# Patient Record
Sex: FEMALE | Race: WHITE | NOT HISPANIC OR LATINO
[De-identification: names, ages, dates, MRNs, and addresses within clinical notes are randomized per-mention and may not be internally consistent; named-entity substitution may affect disease eponyms.]

---

## 2017-01-31 ENCOUNTER — APPOINTMENT (OUTPATIENT)
Dept: BREAST CENTER | Facility: CLINIC | Age: 27
End: 2017-01-31

## 2017-01-31 VITALS
HEART RATE: 110 BPM | SYSTOLIC BLOOD PRESSURE: 110 MMHG | HEIGHT: 61 IN | TEMPERATURE: 97.8 F | WEIGHT: 93 LBS | BODY MASS INDEX: 17.56 KG/M2 | DIASTOLIC BLOOD PRESSURE: 66 MMHG

## 2017-02-20 ENCOUNTER — FORM ENCOUNTER (OUTPATIENT)
Age: 27
End: 2017-02-20

## 2017-02-21 ENCOUNTER — RESULT REVIEW (OUTPATIENT)
Age: 27
End: 2017-02-21

## 2017-02-21 ENCOUNTER — APPOINTMENT (OUTPATIENT)
Dept: SURGERY | Facility: CLINIC | Age: 27
End: 2017-02-21

## 2017-02-21 ENCOUNTER — OUTPATIENT (OUTPATIENT)
Dept: OUTPATIENT SERVICES | Facility: HOSPITAL | Age: 27
LOS: 1 days | End: 2017-02-21
Payer: COMMERCIAL

## 2017-02-21 VITALS
SYSTOLIC BLOOD PRESSURE: 82 MMHG | WEIGHT: 95.24 LBS | HEART RATE: 86 BPM | HEIGHT: 61 IN | DIASTOLIC BLOOD PRESSURE: 59 MMHG | RESPIRATION RATE: 16 BRPM | TEMPERATURE: 99 F

## 2017-02-21 DIAGNOSIS — G43.909 MIGRAINE, UNSPECIFIED, NOT INTRACTABLE, WITHOUT STATUS MIGRAINOSUS: ICD-10-CM

## 2017-02-21 DIAGNOSIS — N39.0 URINARY TRACT INFECTION, SITE NOT SPECIFIED: ICD-10-CM

## 2017-02-21 DIAGNOSIS — C50.912 MALIGNANT NEOPLASM OF UNSPECIFIED SITE OF LEFT FEMALE BREAST: ICD-10-CM

## 2017-02-21 DIAGNOSIS — F41.9 ANXIETY DISORDER, UNSPECIFIED: ICD-10-CM

## 2017-02-21 DIAGNOSIS — G43.009 MIGRAINE WITHOUT AURA, NOT INTRACTABLE, WITHOUT STATUS MIGRAINOSUS: ICD-10-CM

## 2017-02-21 DIAGNOSIS — Z01.818 ENCOUNTER FOR OTHER PREPROCEDURAL EXAMINATION: ICD-10-CM

## 2017-02-21 DIAGNOSIS — J30.2 OTHER SEASONAL ALLERGIC RHINITIS: ICD-10-CM

## 2017-02-21 DIAGNOSIS — Z98.890 OTHER SPECIFIED POSTPROCEDURAL STATES: Chronic | ICD-10-CM

## 2017-02-21 LAB
ANION GAP SERPL CALC-SCNC: 8 MMOL/L — LOW (ref 9–16)
APPEARANCE UR: CLEAR — SIGNIFICANT CHANGE UP
APTT BLD: 31.3 SEC — SIGNIFICANT CHANGE UP (ref 27.5–37.4)
BACTERIA # UR AUTO: PRESENT /HPF
BILIRUB UR-MCNC: NEGATIVE — SIGNIFICANT CHANGE UP
BUN SERPL-MCNC: 18 MG/DL — SIGNIFICANT CHANGE UP (ref 7–23)
CALCIUM SERPL-MCNC: 8.7 MG/DL — SIGNIFICANT CHANGE UP (ref 8.5–10.5)
CHLORIDE SERPL-SCNC: 106 MMOL/L — SIGNIFICANT CHANGE UP (ref 96–108)
CO2 SERPL-SCNC: 26 MMOL/L — SIGNIFICANT CHANGE UP (ref 22–31)
COLOR SPEC: YELLOW — SIGNIFICANT CHANGE UP
COMMENT - URINE: SIGNIFICANT CHANGE UP
CREAT SERPL-MCNC: 0.51 MG/DL — SIGNIFICANT CHANGE UP (ref 0.5–1.3)
DIFF PNL FLD: (no result)
EPI CELLS # UR: SIGNIFICANT CHANGE UP /HPF
GLUCOSE SERPL-MCNC: 73 MG/DL — SIGNIFICANT CHANGE UP (ref 70–99)
GLUCOSE UR QL: NEGATIVE — SIGNIFICANT CHANGE UP
HCG SERPL-ACNC: <1 MIU/ML — SIGNIFICANT CHANGE UP
HCT VFR BLD CALC: 31.5 % — LOW (ref 34.5–45)
HGB BLD-MCNC: 10.4 G/DL — LOW (ref 11.5–15.5)
INR BLD: 1.13 — SIGNIFICANT CHANGE UP (ref 0.88–1.16)
KETONES UR-MCNC: NEGATIVE — SIGNIFICANT CHANGE UP
LEUKOCYTE ESTERASE UR-ACNC: NEGATIVE — SIGNIFICANT CHANGE UP
MCHC RBC-ENTMCNC: 31.5 PG — SIGNIFICANT CHANGE UP (ref 27–34)
MCHC RBC-ENTMCNC: 33 G/DL — SIGNIFICANT CHANGE UP (ref 32–36)
MCV RBC AUTO: 95.5 FL — SIGNIFICANT CHANGE UP (ref 80–100)
NITRITE UR-MCNC: NEGATIVE — SIGNIFICANT CHANGE UP
PH UR: 6.5 — SIGNIFICANT CHANGE UP (ref 4–8)
PLATELET # BLD AUTO: 334 K/UL — SIGNIFICANT CHANGE UP (ref 150–400)
POTASSIUM SERPL-MCNC: 3.4 MMOL/L — LOW (ref 3.5–5.3)
POTASSIUM SERPL-SCNC: 3.4 MMOL/L — LOW (ref 3.5–5.3)
PROT UR-MCNC: NEGATIVE MG/DL — SIGNIFICANT CHANGE UP
PROTHROM AB SERPL-ACNC: 12.6 SEC — SIGNIFICANT CHANGE UP (ref 10–13.1)
RBC # BLD: 3.3 M/UL — LOW (ref 3.8–5.2)
RBC # FLD: 13.3 % — SIGNIFICANT CHANGE UP (ref 10.3–16.9)
RBC CASTS # UR COMP ASSIST: < 5 /HPF — SIGNIFICANT CHANGE UP
SODIUM SERPL-SCNC: 140 MMOL/L — SIGNIFICANT CHANGE UP (ref 135–145)
SP GR SPEC: 1.02 — SIGNIFICANT CHANGE UP (ref 1–1.03)
UROBILINOGEN FLD QL: 1 E.U./DL — SIGNIFICANT CHANGE UP
WBC # BLD: 6.6 K/UL — SIGNIFICANT CHANGE UP (ref 3.8–10.5)
WBC # FLD AUTO: 6.6 K/UL — SIGNIFICANT CHANGE UP (ref 3.8–10.5)
WBC UR QL: < 5 /HPF — SIGNIFICANT CHANGE UP

## 2017-02-21 PROCEDURE — 93010 ELECTROCARDIOGRAM REPORT: CPT

## 2017-02-21 PROCEDURE — 71020: CPT | Mod: 26

## 2017-02-21 NOTE — H&P PST ADULT - FAMILY HISTORY
Father  Still living? Yes, Estimated age: Age Unknown  Family history of hypertension in father, Age at diagnosis: Age Unknown  Family history of diabetes mellitus in father, Age at diagnosis: Age Unknown  Family history of hypercholesterolemia, Age at diagnosis: Age Unknown

## 2017-02-21 NOTE — H&P PST ADULT - NSANTHOSAYNRD_GEN_A_CORE
No. ARIE screening performed.  STOP BANG Legend: 0-2 = LOW Risk; 3-4 = INTERMEDIATE Risk; 5-8 = HIGH Risk

## 2017-02-21 NOTE — H&P PST ADULT - HISTORY OF PRESENT ILLNESS
26 year old female with carcinoma of breast 26 year old white female with carcinoma of breast on left.  Patient with mass on exam and mammogram sonogram confirmed diagnosis.  Patient had two biopsies of the breast positive adenocarcinoma and Lymph glands positive for at least two.  Family history of breast cancer.  Paternal great aunt.  First pregnancy age 17.

## 2017-02-21 NOTE — H&P PST ADULT - REASON FOR ADMISSION
pre op for left breast total mastectomy with bilateral tissue expanders pre op for left breast total mastectomy and right simple mastectomy with bilateral tissue expanders

## 2017-02-22 ENCOUNTER — OUTPATIENT (OUTPATIENT)
Dept: OUTPATIENT SERVICES | Facility: HOSPITAL | Age: 27
LOS: 1 days | End: 2017-02-22
Payer: COMMERCIAL

## 2017-02-22 DIAGNOSIS — Z98.890 OTHER SPECIFIED POSTPROCEDURAL STATES: Chronic | ICD-10-CM

## 2017-02-22 DIAGNOSIS — C50.912 MALIGNANT NEOPLASM OF UNSPECIFIED SITE OF LEFT FEMALE BREAST: ICD-10-CM

## 2017-02-22 LAB
CULTURE RESULTS: NO GROWTH — SIGNIFICANT CHANGE UP
SPECIMEN SOURCE: SIGNIFICANT CHANGE UP

## 2017-02-23 LAB — SURGICAL PATHOLOGY STUDY: SIGNIFICANT CHANGE UP

## 2017-03-02 ENCOUNTER — OUTPATIENT (OUTPATIENT)
Dept: OUTPATIENT SERVICES | Facility: HOSPITAL | Age: 27
LOS: 1 days | End: 2017-03-02
Payer: COMMERCIAL

## 2017-03-02 ENCOUNTER — APPOINTMENT (OUTPATIENT)
Dept: PLASTIC SURGERY | Facility: CLINIC | Age: 27
End: 2017-03-02

## 2017-03-02 DIAGNOSIS — Z98.890 OTHER SPECIFIED POSTPROCEDURAL STATES: Chronic | ICD-10-CM

## 2017-03-02 PROCEDURE — 74177 CT ABD & PELVIS W/CONTRAST: CPT

## 2017-03-02 PROCEDURE — 71260 CT THORAX DX C+: CPT | Mod: 26

## 2017-03-02 PROCEDURE — 74177 CT ABD & PELVIS W/CONTRAST: CPT | Mod: 26

## 2017-03-02 PROCEDURE — 78472 GATED HEART PLANAR SINGLE: CPT | Mod: 26

## 2017-03-02 PROCEDURE — 78472 GATED HEART PLANAR SINGLE: CPT

## 2017-03-02 PROCEDURE — 71260 CT THORAX DX C+: CPT

## 2017-03-02 PROCEDURE — A9560: CPT

## 2017-03-03 VITALS
TEMPERATURE: 98 F | OXYGEN SATURATION: 99 % | DIASTOLIC BLOOD PRESSURE: 61 MMHG | HEIGHT: 61 IN | HEART RATE: 83 BPM | WEIGHT: 92.15 LBS | SYSTOLIC BLOOD PRESSURE: 91 MMHG | RESPIRATION RATE: 18 BRPM

## 2017-03-03 NOTE — PATIENT PROFILE ADULT. - TEACHING/LEARNING LEARNING PREFERENCES
written material/individual instruction/verbal instruction/audio individual instruction/verbal instruction/skill demonstration/written material

## 2017-03-03 NOTE — PATIENT PROFILE ADULT. - PMH
Breast cancer    Migraine headache    Urinary tract infection  2016 Breast cancer  left  Migraine headache    Urinary tract infection  2016

## 2017-03-03 NOTE — PRE-OP CHECKLIST - SELECT TESTS ORDERED
CXR/HCG/PT/PTT/INR/Urinalysis/EKG/CBC/CMP CBC/CXR/HCG/PT/PTT/CMP/Urinalysis/LMP FEBRUARY26,2017 ICON -/INR/EKG CBC/PT/PTT/HCG/CXR/Urinalysis/LMP FEBRUARY26,2017 ICON - negative/CMP/INR/EKG

## 2017-03-03 NOTE — PATIENT PROFILE ADULT. - PSH
Status post surgery  Vaccess CT power injectable (2016) H/O left breast biopsy  lymph node  Status post surgery  Vaccess CT power injectable (2016)

## 2017-03-05 ENCOUNTER — RESULT REVIEW (OUTPATIENT)
Age: 27
End: 2017-03-05

## 2017-03-06 ENCOUNTER — APPOINTMENT (OUTPATIENT)
Dept: PLASTIC SURGERY | Facility: CLINIC | Age: 27
End: 2017-03-06

## 2017-03-06 ENCOUNTER — APPOINTMENT (OUTPATIENT)
Dept: BREAST CENTER | Facility: HOSPITAL | Age: 27
End: 2017-03-06

## 2017-03-06 ENCOUNTER — APPOINTMENT (OUTPATIENT)
Dept: PLASTIC SURGERY | Facility: HOSPITAL | Age: 27
End: 2017-03-06

## 2017-03-06 ENCOUNTER — INPATIENT (INPATIENT)
Facility: HOSPITAL | Age: 27
LOS: 1 days | Discharge: HOME | DRG: 581 | End: 2017-03-08
Attending: SURGERY | Admitting: SURGERY
Payer: COMMERCIAL

## 2017-03-06 DIAGNOSIS — Z98.890 OTHER SPECIFIED POSTPROCEDURAL STATES: Chronic | ICD-10-CM

## 2017-03-06 DIAGNOSIS — C50.912 MALIGNANT NEOPLASM OF UNSPECIFIED SITE OF LEFT FEMALE BREAST: ICD-10-CM

## 2017-03-06 DIAGNOSIS — G43.009 MIGRAINE WITHOUT AURA, NOT INTRACTABLE, WITHOUT STATUS MIGRAINOSUS: ICD-10-CM

## 2017-03-06 DIAGNOSIS — F41.9 ANXIETY DISORDER, UNSPECIFIED: ICD-10-CM

## 2017-03-06 DIAGNOSIS — N30.00 ACUTE CYSTITIS WITHOUT HEMATURIA: ICD-10-CM

## 2017-03-06 DIAGNOSIS — C50.919 MALIGNANT NEOPLASM OF UNSPECIFIED SITE OF UNSPECIFIED FEMALE BREAST: ICD-10-CM

## 2017-03-06 PROCEDURE — 19357 TISS XPNDR PLMT BRST RCNSTJ: CPT | Mod: 50

## 2017-03-06 PROCEDURE — 19307 MAST MOD RAD: CPT | Mod: 59,LT,GC

## 2017-03-06 PROCEDURE — 19303 MAST SIMPLE COMPLETE: CPT | Mod: RT,GC

## 2017-03-06 RX ORDER — HYDROMORPHONE HYDROCHLORIDE 2 MG/ML
0.5 INJECTION INTRAMUSCULAR; INTRAVENOUS; SUBCUTANEOUS EVERY 4 HOURS
Qty: 0 | Refills: 0 | Status: DISCONTINUED | OUTPATIENT
Start: 2017-03-06 | End: 2017-03-06

## 2017-03-06 RX ORDER — CEFAZOLIN SODIUM 1 G
1000 VIAL (EA) INJECTION ONCE
Qty: 0 | Refills: 0 | Status: COMPLETED | OUTPATIENT
Start: 2017-03-06 | End: 2017-03-06

## 2017-03-06 RX ORDER — CEFAZOLIN SODIUM 1 G
VIAL (EA) INJECTION
Qty: 0 | Refills: 0 | Status: DISCONTINUED | OUTPATIENT
Start: 2017-03-06 | End: 2017-03-08

## 2017-03-06 RX ORDER — KETOROLAC TROMETHAMINE 30 MG/ML
15 SYRINGE (ML) INJECTION EVERY 8 HOURS
Qty: 0 | Refills: 0 | Status: DISCONTINUED | OUTPATIENT
Start: 2017-03-06 | End: 2017-03-08

## 2017-03-06 RX ORDER — BUPIVACAINE 13.3 MG/ML
20 INJECTION, SUSPENSION, LIPOSOMAL INFILTRATION ONCE
Qty: 0 | Refills: 0 | Status: DISCONTINUED | OUTPATIENT
Start: 2017-03-06 | End: 2017-03-08

## 2017-03-06 RX ORDER — OXYCODONE HYDROCHLORIDE 5 MG/1
5 TABLET ORAL EVERY 6 HOURS
Qty: 0 | Refills: 0 | Status: DISCONTINUED | OUTPATIENT
Start: 2017-03-06 | End: 2017-03-07

## 2017-03-06 RX ORDER — CEFAZOLIN SODIUM 1 G
1000 VIAL (EA) INJECTION EVERY 8 HOURS
Qty: 0 | Refills: 0 | Status: DISCONTINUED | OUTPATIENT
Start: 2017-03-07 | End: 2017-03-08

## 2017-03-06 RX ORDER — ONDANSETRON 8 MG/1
4 TABLET, FILM COATED ORAL EVERY 6 HOURS
Qty: 0 | Refills: 0 | Status: DISCONTINUED | OUTPATIENT
Start: 2017-03-06 | End: 2017-03-08

## 2017-03-06 RX ORDER — HYDROMORPHONE HYDROCHLORIDE 2 MG/ML
0.25 INJECTION INTRAMUSCULAR; INTRAVENOUS; SUBCUTANEOUS EVERY 4 HOURS
Qty: 0 | Refills: 0 | Status: DISCONTINUED | OUTPATIENT
Start: 2017-03-06 | End: 2017-03-07

## 2017-03-06 RX ORDER — SODIUM CHLORIDE 9 MG/ML
1000 INJECTION, SOLUTION INTRAVENOUS
Qty: 0 | Refills: 0 | Status: DISCONTINUED | OUTPATIENT
Start: 2017-03-06 | End: 2017-03-07

## 2017-03-06 RX ORDER — HYDROMORPHONE HYDROCHLORIDE 2 MG/ML
1 INJECTION INTRAMUSCULAR; INTRAVENOUS; SUBCUTANEOUS EVERY 4 HOURS
Qty: 0 | Refills: 0 | Status: DISCONTINUED | OUTPATIENT
Start: 2017-03-06 | End: 2017-03-06

## 2017-03-06 RX ORDER — DIAZEPAM 5 MG
5 TABLET ORAL EVERY 8 HOURS
Qty: 0 | Refills: 0 | Status: DISCONTINUED | OUTPATIENT
Start: 2017-03-06 | End: 2017-03-08

## 2017-03-06 RX ORDER — OXYCODONE HYDROCHLORIDE 5 MG/1
10 TABLET ORAL EVERY 6 HOURS
Qty: 0 | Refills: 0 | Status: DISCONTINUED | OUTPATIENT
Start: 2017-03-06 | End: 2017-03-07

## 2017-03-06 RX ORDER — CARDIOPLEGIC SOLUTION NO.1 K+=16MEQ/L
1000 PLASTIC BAG, PERFUSION (ML) PERFUSION
Qty: 0 | Refills: 0 | Status: DISCONTINUED | OUTPATIENT
Start: 2017-03-06 | End: 2017-03-06

## 2017-03-06 RX ADMIN — HYDROMORPHONE HYDROCHLORIDE 0.25 MILLIGRAM(S): 2 INJECTION INTRAMUSCULAR; INTRAVENOUS; SUBCUTANEOUS at 20:35

## 2017-03-06 RX ADMIN — Medication 15 MILLIGRAM(S): at 22:03

## 2017-03-06 RX ADMIN — Medication 15 MILLIGRAM(S): at 22:18

## 2017-03-06 RX ADMIN — Medication 100 MILLIGRAM(S): at 22:03

## 2017-03-06 RX ADMIN — HYDROMORPHONE HYDROCHLORIDE 0.25 MILLIGRAM(S): 2 INJECTION INTRAMUSCULAR; INTRAVENOUS; SUBCUTANEOUS at 20:50

## 2017-03-06 RX ADMIN — Medication 5 MILLIGRAM(S): at 22:03

## 2017-03-06 NOTE — H&P ADULT - HISTORY OF PRESENT ILLNESS
27 y/o F presents with L breast cancer for L modified radical mastectomy and R total mastectomy with tissue expanders

## 2017-03-06 NOTE — BRIEF OPERATIVE NOTE - POST-OP DX
Breast cancer, left  03/06/2017    Active  Megan Decker
Breast cancer, left  03/06/2017    Active  Megan Decker

## 2017-03-06 NOTE — BRIEF OPERATIVE NOTE - SPECIMENS
R breast retroareolar tissue, R breast tissue, L breast anterior superior margin, L breast w/ cancer suture, L axillary contents level 1 and 2

## 2017-03-06 NOTE — PROGRESS NOTE ADULT - SUBJECTIVE AND OBJECTIVE BOX
Pre Op Dx: Left breast cancer  Procedure: Bilateral mastectomy with tissue expanders  Surgeon: Bernik, Lerman    S: Pt has no complaints. Denies CP, SOB, N/V. Pain controlled with medication.    O:  T(C): 36.7, Max: 37.6 (03-06 @ 20:00)  T(F): 98, Max: 99.6 (03-06 @ 20:00)  HR: 78 (78 - 99)  BP: 112/58 (110/56 - 118/65)  RR: 18 (12 - 18)  SpO2: 100% (99% - 100%)  Wt(kg): --            Gen: NAD, resting comfortably in bed  Neuro: AAOx3, No focal deficits  C/V: RRR, +S1/S2  Pulm: CTAB, no respiratory distress  Abd: soft, ND, ATTP  Incision: C/D/I  Drains:    Extrem: WNL, SCDs in place      A/P: 26yFemale s/p above procedure  Diet: CLD advance when tolerated   IVF until tolerating diet  Pain/nausea control PRN   DVT ppx: SCDs/SQH  Abx: Ancef  CALIXTO x 3

## 2017-03-06 NOTE — CONSULT NOTE ADULT - SUBJECTIVE AND OBJECTIVE BOX
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  Patient with mass on exam and mammogram sonogram confirmed diagnosis.  Patient had two biopsies of the breast positive adenocarcinoma and Lymph glands positive for at least two.  Family history of breast cancer.  Paternal great aunt.  First pregnancy age 17.      PAST MEDICAL & SURGICAL HISTORY:  Urinary tract infection: 2016  Migraine headache  Breast cancer: left  H/O left breast biopsy: lymph node  Status post surgery: Vaccess CT power injectable (2016)  No significant past surgical history      REVIEW OF SYSTEMS    General:  normal  Skin/Breast: normal  Ophthalmologic: negative  ENMT:	normal  Respiratory and Thorax: normal  Cardiovascular:	normal  Gastrointestinal:	normal  Genitourinary:	normal  Musculoskeletal: normal  Neurological:	normal  Psychiatric:	normal  Hematology/Lymphatics:	negative  Endocrine:	negative  Allergic/Immunologic:	negative      MEDICATIONS  (STANDING):  BUpivacaine liposome 1.3% Injectable (no eMAR) 20milliLiter(s) Local Injection once  sodium chloride 0.9% Irrigation 1000milliLiter(s) Continuous Irrigation <Continuous>    Allergies    No Known Allergies    SOCIAL HISTORY:    FAMILY HISTORY:  Family history of hypercholesterolemia (Father)  Family history of diabetes mellitus in father (Father)  Family history of hypertension in father (Father)      PHYSICAL EXAM:          Vital Signs Last 24 Hrs  T(C): --  T(F): --98.6  HR: --72  BP: --120/80  BP(mean): --  RR: --16  SpO2: --    Constitutional: WDWNF in NAD  Eyes: conj pink  ENMT: negative  Neck: supple  Breasts: not examined   Back: negative  Respiratory: clear to P&A  Cardiovascular: no MRGT or H  Gastrointestinal: normal bowel sounds  Genitourinary: neg  Rectal: not examined  Extremities: normal  Vascular: normal  Neurological: normal  Skin: negative  Lymph Nodes: negative  Musculoskeletal:  normal   Psychiatric: anxiety      LABS: HPI:  26 year old white female with carcinoma of breast on left.  Patient with mass on exam and mammogram sonogram confirmed diagnosis.  Patient had two biopsies of the breast positive adenocarcinoma and Lymph glands positive for at least two.  Family history of breast cancer.  Paternal great aunt.  First pregnancy age 17.      PAST MEDICAL & SURGICAL HISTORY:  Urinary tract infection: 2016  Migraine headache  Breast cancer: left  H/O left breast biopsy: lymph node  Status post surgery: Vaccess CT power injectable (2016)  No significant past surgical history      REVIEW OF SYSTEMS    General:  normal  Skin/Breast: normal  Ophthalmologic: negative  ENMT:	normal  Respiratory and Thorax: normal  Cardiovascular:	normal  Gastrointestinal:	normal  Genitourinary:	normal  Musculoskeletal: normal  Neurological:	normal  Psychiatric:	normal  Hematology/Lymphatics:	negative  Endocrine:	negative  Allergic/Immunologic:	negative      MEDICATIONS  (STANDING):  BUpivacaine liposome 1.3% Injectable (no eMAR) 20milliLiter(s) Local Injection once  sodium chloride 0.9% Irrigation 1000milliLiter(s) Continuous Irrigation <Continuous>    Allergies    No Known Allergies    SOCIAL HISTORY:    FAMILY HISTORY:  Family history of hypercholesterolemia (Father)  Family history of diabetes mellitus in father (Father)  Family history of hypertension in father (Father)      PHYSICAL EXAM:     Vital Signs Last 24 Hrs  T(C): --  T(F): --98.6  HR: --72  BP: --120/80  BP(mean): --  RR: --16  SpO2: --    Constitutional: WDWNF in NAD  Eyes: conj pink  ENMT: negative  Neck: supple  Breasts: not examined   Back: negative  Respiratory: clear to P&A  Cardiovascular: no MRGT or H  Gastrointestinal: normal bowel sounds  Genitourinary: neg  Rectal: not examined  Extremities: normal  Vascular: normal  Neurological: normal  Skin: negative  Lymph Nodes: negative  Musculoskeletal:  normal   Psychiatric: anxiety      LABS:

## 2017-03-06 NOTE — H&P ADULT - ASSESSMENT
26 yr old female w/ hx L breast cancer presents for b/l mastectomy w/ tissue expanders after neoadjuvant therapy

## 2017-03-07 DIAGNOSIS — D50.0 IRON DEFICIENCY ANEMIA SECONDARY TO BLOOD LOSS (CHRONIC): ICD-10-CM

## 2017-03-07 LAB
ANION GAP SERPL CALC-SCNC: 7 MMOL/L — LOW (ref 9–16)
BUN SERPL-MCNC: 10 MG/DL — SIGNIFICANT CHANGE UP (ref 7–23)
CALCIUM SERPL-MCNC: 7.8 MG/DL — LOW (ref 8.5–10.5)
CHLORIDE SERPL-SCNC: 106 MMOL/L — SIGNIFICANT CHANGE UP (ref 96–108)
CO2 SERPL-SCNC: 24 MMOL/L — SIGNIFICANT CHANGE UP (ref 22–31)
CREAT SERPL-MCNC: 0.48 MG/DL — LOW (ref 0.5–1.3)
GLUCOSE SERPL-MCNC: 84 MG/DL — SIGNIFICANT CHANGE UP (ref 70–99)
HCT VFR BLD CALC: 26.4 % — LOW (ref 34.5–45)
HGB BLD-MCNC: 8.9 G/DL — LOW (ref 11.5–15.5)
MAGNESIUM SERPL-MCNC: 1.5 MG/DL — LOW (ref 1.6–2.4)
MCHC RBC-ENTMCNC: 31.1 PG — SIGNIFICANT CHANGE UP (ref 27–34)
MCHC RBC-ENTMCNC: 33.7 G/DL — SIGNIFICANT CHANGE UP (ref 32–36)
MCV RBC AUTO: 92.3 FL — SIGNIFICANT CHANGE UP (ref 80–100)
PHOSPHATE SERPL-MCNC: 3.8 MG/DL — SIGNIFICANT CHANGE UP (ref 2.5–4.5)
PLATELET # BLD AUTO: 192 K/UL — SIGNIFICANT CHANGE UP (ref 150–400)
POTASSIUM SERPL-MCNC: 3.5 MMOL/L — SIGNIFICANT CHANGE UP (ref 3.5–5.3)
POTASSIUM SERPL-SCNC: 3.5 MMOL/L — SIGNIFICANT CHANGE UP (ref 3.5–5.3)
RBC # BLD: 2.86 M/UL — LOW (ref 3.8–5.2)
RBC # FLD: 11.9 % — SIGNIFICANT CHANGE UP (ref 10.3–16.9)
SODIUM SERPL-SCNC: 137 MMOL/L — SIGNIFICANT CHANGE UP (ref 135–145)
WBC # BLD: 7.3 K/UL — SIGNIFICANT CHANGE UP (ref 3.8–10.5)
WBC # FLD AUTO: 7.3 K/UL — SIGNIFICANT CHANGE UP (ref 3.8–10.5)

## 2017-03-07 RX ORDER — DIAZEPAM 5 MG
1 TABLET ORAL
Qty: 21 | Refills: 0 | OUTPATIENT
Start: 2017-03-07 | End: 2017-03-14

## 2017-03-07 RX ORDER — HEPARIN SODIUM 5000 [USP'U]/ML
5000 INJECTION INTRAVENOUS; SUBCUTANEOUS EVERY 12 HOURS
Qty: 0 | Refills: 0 | Status: DISCONTINUED | OUTPATIENT
Start: 2017-03-07 | End: 2017-03-08

## 2017-03-07 RX ORDER — ACETAMINOPHEN 500 MG
650 TABLET ORAL EVERY 6 HOURS
Qty: 0 | Refills: 0 | Status: DISCONTINUED | OUTPATIENT
Start: 2017-03-07 | End: 2017-03-07

## 2017-03-07 RX ORDER — HYDROMORPHONE HYDROCHLORIDE 2 MG/ML
0.25 INJECTION INTRAMUSCULAR; INTRAVENOUS; SUBCUTANEOUS EVERY 4 HOURS
Qty: 0 | Refills: 0 | Status: DISCONTINUED | OUTPATIENT
Start: 2017-03-07 | End: 2017-03-08

## 2017-03-07 RX ORDER — OXYCODONE HYDROCHLORIDE 5 MG/1
1 TABLET ORAL
Qty: 42 | Refills: 0 | OUTPATIENT
Start: 2017-03-07 | End: 2017-03-14

## 2017-03-07 RX ADMIN — Medication 15 MILLIGRAM(S): at 22:17

## 2017-03-07 RX ADMIN — HYDROMORPHONE HYDROCHLORIDE 0.25 MILLIGRAM(S): 2 INJECTION INTRAMUSCULAR; INTRAVENOUS; SUBCUTANEOUS at 11:25

## 2017-03-07 RX ADMIN — Medication 15 MILLIGRAM(S): at 05:24

## 2017-03-07 RX ADMIN — HEPARIN SODIUM 5000 UNIT(S): 5000 INJECTION INTRAVENOUS; SUBCUTANEOUS at 17:14

## 2017-03-07 RX ADMIN — OXYCODONE HYDROCHLORIDE 5 MILLIGRAM(S): 5 TABLET ORAL at 00:22

## 2017-03-07 RX ADMIN — Medication 15 MILLIGRAM(S): at 22:32

## 2017-03-07 RX ADMIN — Medication 100 MILLIGRAM(S): at 22:17

## 2017-03-07 RX ADMIN — OXYCODONE HYDROCHLORIDE 5 MILLIGRAM(S): 5 TABLET ORAL at 08:00

## 2017-03-07 RX ADMIN — Medication 15 MILLIGRAM(S): at 13:18

## 2017-03-07 RX ADMIN — OXYCODONE HYDROCHLORIDE 5 MILLIGRAM(S): 5 TABLET ORAL at 01:20

## 2017-03-07 RX ADMIN — Medication 5 MILLIGRAM(S): at 05:09

## 2017-03-07 RX ADMIN — Medication 15 MILLIGRAM(S): at 05:09

## 2017-03-07 RX ADMIN — OXYCODONE HYDROCHLORIDE 5 MILLIGRAM(S): 5 TABLET ORAL at 07:18

## 2017-03-07 RX ADMIN — HYDROMORPHONE HYDROCHLORIDE 0.25 MILLIGRAM(S): 2 INJECTION INTRAMUSCULAR; INTRAVENOUS; SUBCUTANEOUS at 10:55

## 2017-03-07 RX ADMIN — Medication 5 MILLIGRAM(S): at 22:17

## 2017-03-07 RX ADMIN — Medication 100 MILLIGRAM(S): at 13:17

## 2017-03-07 RX ADMIN — Medication 100 MILLIGRAM(S): at 06:08

## 2017-03-07 RX ADMIN — Medication 5 MILLIGRAM(S): at 13:18

## 2017-03-07 RX ADMIN — Medication 15 MILLIGRAM(S): at 13:38

## 2017-03-07 NOTE — PROGRESS NOTE ADULT - SUBJECTIVE AND OBJECTIVE BOX
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  Patient with mass on exam and mammogram sonogram confirmed diagnosis.  Patient had two biopsies of the breast positive adenocarcinoma and Lymph glands positive for at least two.  Family history of breast cancer.  Paternal great aunt.  First pregnancy age 17.  Patient day #1 post op bilateral total mastectomies with bilateral tissue expanders with moderate pain and malaise.      PAST MEDICAL & SURGICAL HISTORY:  Urinary tract infection: 2016  Migraine headache  Breast cancer: left  H/O left breast biopsy: lymph node  Status post surgery: Vaccess CT power injectable (2016)  No significant past surgical history      MEDICATIONS  (STANDING):  BUpivacaine liposome 1.3% Injectable (no eMAR) 20milliLiter(s) Local Injection once  lactated ringers. 1000milliLiter(s) IV Continuous <Continuous>  ceFAZolin   IVPB  IV Intermittent   ketorolac   Injectable 15milliGRAM(s) IV Push every 8 hours  diazepam    Tablet 5milliGRAM(s) Oral every 8 hours  ceFAZolin   IVPB 1000milliGRAM(s) IV Intermittent every 8 hours    MEDICATIONS  (PRN):  ondansetron Injectable 4milliGRAM(s) IV Push every 6 hours PRN Nausea  oxyCODONE IR 5milliGRAM(s) Oral every 6 hours PRN Moderate Pain (4 - 6)  oxyCODONE IR 10milliGRAM(s) Oral every 6 hours PRN Severe Pain (7 - 10)  HYDROmorphone  Injectable 0.25milliGRAM(s) IV Push every 4 hours PRN Severe Pain (7 - 10)      Allergies    No Known Allergies    REVIEW OF SYSTEMS    General:  malaise	  Skin/Breast: normal  Ophthalmologic: negative  ENMT:	normal  Respiratory and Thorax: normal  Cardiovascular:	normal  Gastrointestinal:	normal  Genitourinary:	normal  Musculoskeletal:	swelling   Neurological:	normal  Psychiatric:	normal  Hematology/Lymphatics:	negative  Endocrine:	negative  Allergic/Immunologic:	negative      PHYSICAL EXAM:    Vital Signs Last 24 Hrs  T(C): 36.3, Max: 37.6 (03-06 @ 20:00)  T(F): 97.4, Max: 99.6 (03-06 @ 20:00)  HR: 82 (78 - 99)  BP: 105/65 (94/63 - 118/65)  BP(mean): --  RR: 18 (12 - 18)  SpO2: 100% (99% - 100%)    Constitutional: WDWNF in NAD  Eyes: conj pale  ENMT: negative  Neck: supple  Breasts: not examined   Back: negative  Respiratory: clear to P&A  Cardiovascular: no MRGT or H  Gastrointestinal: normal bowel sounds  Genitourinary: neg  Rectal: not examined  Extremities:  normal  Vascular: normal  Neurological: normal  Skin: negative  Lymph Nodes: negative  Musculoskeletal:  normal    Psychiatric: anxiety

## 2017-03-07 NOTE — PHYSICAL THERAPY INITIAL EVALUATION ADULT - ADDITIONAL COMMENTS
Patient lives in apartment with  and 3 children, 2 steps to enter. Patient lives in apartment with  and 3 children, 2 steps to enter. Patient's mother will stay with her first week and spouse will be available afterwards.

## 2017-03-07 NOTE — PROGRESS NOTE ADULT - PROBLEM SELECTOR PLAN 1
Discussed with house staff and nursing staff.  Pain management, PT, incentive spirometer, DVT prophylaxis, repeat labs and discharge planning.

## 2017-03-07 NOTE — PHYSICAL THERAPY INITIAL EVALUATION ADULT - ACTIVE RANGE OF MOTION EXAMINATION, REHAB EVAL
deficits as listed below/bilateral  lower extremity Active ROM was WFL (within functional limits)/Bilateral UE/LE AROM pain-limited/bilateral upper extremity Active ROM was WFL (within functional limits) deficits as listed below/Bilateral UE AROM pain-limited/bilateral upper extremity Active ROM was WFL (within functional limits)/bilateral  lower extremity Active ROM was WFL (within functional limits)

## 2017-03-07 NOTE — PHYSICAL THERAPY INITIAL EVALUATION ADULT - MANUAL MUSCLE TESTING RESULTS, REHAB EVAL
not tested due to/BUE not tested due to pain guarding grossly assessed due to/BUE not tested due to pain guarding, BLE >3/5 base don functional assessment

## 2017-03-07 NOTE — PHYSICAL THERAPY INITIAL EVALUATION ADULT - GENERAL OBSERVATIONS, REHAB EVAL
Patient received semi-supine in bed, No apparent distress, +JPx3, 2L of supplemental O2 (nasal cannula). Patient received semi-supine in bed, No apparent distress, +JPx3, 2L of supplemental O2 (nasal cannula). Patient states 9/10 pain with medication.

## 2017-03-07 NOTE — PROGRESS NOTE ADULT - SUBJECTIVE AND OBJECTIVE BOX
O/N: POC WNL. No S&S of hematoma, JPs x 3 with serosang output.     STATUS POST: bilateral mastectomy, Left axillary node disection, tissue expanders  POST OP DAY #:1 O/N: POC WNL. No S&S of hematoma, JPs x 3 with serosang output.     STATUS POST: bilateral mastectomy, Left axillary node dissection, tissue expanders  POST OP DAY #:1     SUBJECTIVE: Pt c/o pain. Patient seen and examined bedside by chief resident.    ceFAZolin   IVPB  IV Intermittent   ceFAZolin   IVPB 1000milliGRAM(s) IV Intermittent every 8 hours      Vital Signs Last 24 Hrs  T(C): 36.3, Max: 37.6 (03-06 @ 20:00)  T(F): 97.4, Max: 99.6 (03-06 @ 20:00)  HR: 82 (78 - 99)  BP: 105/65 (94/63 - 118/65)  BP(mean): --  RR: 18 (12 - 18)  SpO2: 100% (99% - 100%)  I&O's Detail    I & Os for current day (as of 07 Mar 2017 07:14)  =============================================  IN:    lactated ringers.: 540 ml    IV PiggyBack: 100 ml    Total IN: 640 ml  ---------------------------------------------  OUT:    Voided: 300 ml    Bulb: 120 ml    Bulb: 90 ml    Bulb: 60 ml    Total OUT: 570 ml  ---------------------------------------------  Total NET: 70 ml      General: NAD, resting comfortably in bed  C/V: NSR  Breast: no erythema/edema, no hematoma appreciated, tender to gentle palpation, JPs w/ serosang output  Pulm: Nonlabored breathing, no respiratory distress  Abd: soft, NT/ND.  Extrem: WWP, no edema, SCDs in place

## 2017-03-07 NOTE — PHYSICAL THERAPY INITIAL EVALUATION ADULT - CRITERIA FOR SKILLED THERAPEUTIC INTERVENTIONS
rehab potential/functional limitations in following categories/therapy frequency/impairments found functional limitations in following categories/therapy frequency/impairments found/rehab potential/anticipated discharge recommendation

## 2017-03-07 NOTE — PROGRESS NOTE ADULT - ASSESSMENT
s/p BTM and L ALND with Submuscular TE placement b/l.      -  Advance to regular diet for breakfast  -  If pain is controlled, able to ambulate and tolerate a regular diet, patient may be discharged home on PO Narcotics and PO valium.  No need for ABX.  -  F/u with Dr. Lerman in 1 week

## 2017-03-07 NOTE — PROGRESS NOTE ADULT - SUBJECTIVE AND OBJECTIVE BOX
Seen and examined this AM.  No acute events ovnt.  TPO clears.  Was oob ambulating.  Pain moderately controlled.      Exam:  AFVSS  NAD  bilateral breasts flat, skin intact.  CALIXTO R 90 L 120/60

## 2017-03-08 ENCOUNTER — TRANSCRIPTION ENCOUNTER (OUTPATIENT)
Age: 27
End: 2017-03-08

## 2017-03-08 VITALS
DIASTOLIC BLOOD PRESSURE: 86 MMHG | TEMPERATURE: 98 F | SYSTOLIC BLOOD PRESSURE: 95 MMHG | HEART RATE: 106 BPM | RESPIRATION RATE: 17 BRPM | OXYGEN SATURATION: 98 %

## 2017-03-08 LAB
ANION GAP SERPL CALC-SCNC: 8 MMOL/L — LOW (ref 9–16)
BUN SERPL-MCNC: 9 MG/DL — SIGNIFICANT CHANGE UP (ref 7–23)
CALCIUM SERPL-MCNC: 8.4 MG/DL — LOW (ref 8.5–10.5)
CHLORIDE SERPL-SCNC: 104 MMOL/L — SIGNIFICANT CHANGE UP (ref 96–108)
CO2 SERPL-SCNC: 24 MMOL/L — SIGNIFICANT CHANGE UP (ref 22–31)
CREAT SERPL-MCNC: 0.48 MG/DL — LOW (ref 0.5–1.3)
GLUCOSE SERPL-MCNC: 108 MG/DL — HIGH (ref 70–99)
HCT VFR BLD CALC: 25.9 % — LOW (ref 34.5–45)
HGB BLD-MCNC: 8.6 G/DL — LOW (ref 11.5–15.5)
MAGNESIUM SERPL-MCNC: 1.6 MG/DL — SIGNIFICANT CHANGE UP (ref 1.6–2.4)
MCHC RBC-ENTMCNC: 31.5 PG — SIGNIFICANT CHANGE UP (ref 27–34)
MCHC RBC-ENTMCNC: 33.2 G/DL — SIGNIFICANT CHANGE UP (ref 32–36)
MCV RBC AUTO: 94.9 FL — SIGNIFICANT CHANGE UP (ref 80–100)
PHOSPHATE SERPL-MCNC: 3.1 MG/DL — SIGNIFICANT CHANGE UP (ref 2.5–4.5)
PLATELET # BLD AUTO: 186 K/UL — SIGNIFICANT CHANGE UP (ref 150–400)
POTASSIUM SERPL-MCNC: 3.4 MMOL/L — LOW (ref 3.5–5.3)
POTASSIUM SERPL-SCNC: 3.4 MMOL/L — LOW (ref 3.5–5.3)
RBC # BLD: 2.73 M/UL — LOW (ref 3.8–5.2)
RBC # FLD: 11.2 % — SIGNIFICANT CHANGE UP (ref 10.3–16.9)
SODIUM SERPL-SCNC: 136 MMOL/L — SIGNIFICANT CHANGE UP (ref 135–145)
WBC # BLD: 7.3 K/UL — SIGNIFICANT CHANGE UP (ref 3.8–10.5)
WBC # FLD AUTO: 7.3 K/UL — SIGNIFICANT CHANGE UP (ref 3.8–10.5)

## 2017-03-08 PROCEDURE — 86850 RBC ANTIBODY SCREEN: CPT

## 2017-03-08 PROCEDURE — 88341 IMHCHEM/IMCYTCHM EA ADD ANTB: CPT

## 2017-03-08 PROCEDURE — C1789: CPT

## 2017-03-08 PROCEDURE — 88307 TISSUE EXAM BY PATHOLOGIST: CPT

## 2017-03-08 PROCEDURE — 97161 PT EVAL LOW COMPLEX 20 MIN: CPT

## 2017-03-08 PROCEDURE — 84100 ASSAY OF PHOSPHORUS: CPT

## 2017-03-08 PROCEDURE — 86901 BLOOD TYPING SEROLOGIC RH(D): CPT

## 2017-03-08 PROCEDURE — 36415 COLL VENOUS BLD VENIPUNCTURE: CPT

## 2017-03-08 PROCEDURE — 83735 ASSAY OF MAGNESIUM: CPT

## 2017-03-08 PROCEDURE — 80048 BASIC METABOLIC PNL TOTAL CA: CPT

## 2017-03-08 PROCEDURE — 97116 GAIT TRAINING THERAPY: CPT

## 2017-03-08 PROCEDURE — 88342 IMHCHEM/IMCYTCHM 1ST ANTB: CPT

## 2017-03-08 PROCEDURE — 86900 BLOOD TYPING SEROLOGIC ABO: CPT

## 2017-03-08 PROCEDURE — 85027 COMPLETE CBC AUTOMATED: CPT

## 2017-03-08 PROCEDURE — 88305 TISSUE EXAM BY PATHOLOGIST: CPT

## 2017-03-08 RX ORDER — CEPHALEXIN 500 MG
1 CAPSULE ORAL
Qty: 40 | Refills: 0 | OUTPATIENT
Start: 2017-03-08 | End: 2017-03-18

## 2017-03-08 RX ORDER — ACETAMINOPHEN 500 MG
1000 TABLET ORAL EVERY 6 HOURS
Qty: 0 | Refills: 0 | Status: DISCONTINUED | OUTPATIENT
Start: 2017-03-08 | End: 2017-03-08

## 2017-03-08 RX ORDER — POTASSIUM CHLORIDE 20 MEQ
40 PACKET (EA) ORAL ONCE
Qty: 0 | Refills: 0 | Status: COMPLETED | OUTPATIENT
Start: 2017-03-08 | End: 2017-03-08

## 2017-03-08 RX ORDER — DIAZEPAM 5 MG
5 TABLET ORAL EVERY 6 HOURS
Qty: 0 | Refills: 0 | Status: DISCONTINUED | OUTPATIENT
Start: 2017-03-08 | End: 2017-03-08

## 2017-03-08 RX ORDER — OXYCODONE HYDROCHLORIDE 5 MG/1
5 TABLET ORAL EVERY 4 HOURS
Qty: 0 | Refills: 0 | Status: DISCONTINUED | OUTPATIENT
Start: 2017-03-08 | End: 2017-03-08

## 2017-03-08 RX ORDER — MAGNESIUM SULFATE 500 MG/ML
2 VIAL (ML) INJECTION EVERY 6 HOURS
Qty: 0 | Refills: 0 | Status: COMPLETED | OUTPATIENT
Start: 2017-03-08 | End: 2017-03-08

## 2017-03-08 RX ORDER — KETOROLAC TROMETHAMINE 30 MG/ML
15 SYRINGE (ML) INJECTION EVERY 6 HOURS
Qty: 0 | Refills: 0 | Status: DISCONTINUED | OUTPATIENT
Start: 2017-03-08 | End: 2017-03-08

## 2017-03-08 RX ORDER — DOCUSATE SODIUM 100 MG
100 CAPSULE ORAL
Qty: 0 | Refills: 0 | Status: DISCONTINUED | OUTPATIENT
Start: 2017-03-08 | End: 2017-03-08

## 2017-03-08 RX ORDER — OXYCODONE HYDROCHLORIDE 5 MG/1
10 TABLET ORAL EVERY 4 HOURS
Qty: 0 | Refills: 0 | Status: DISCONTINUED | OUTPATIENT
Start: 2017-03-08 | End: 2017-03-08

## 2017-03-08 RX ORDER — DOCUSATE SODIUM 100 MG
1 CAPSULE ORAL
Qty: 10 | Refills: 0 | OUTPATIENT
Start: 2017-03-08

## 2017-03-08 RX ADMIN — Medication 100 MILLIGRAM(S): at 06:36

## 2017-03-08 RX ADMIN — Medication 1000 MILLIGRAM(S): at 14:35

## 2017-03-08 RX ADMIN — Medication 100 MILLIGRAM(S): at 07:45

## 2017-03-08 RX ADMIN — Medication 15 MILLIGRAM(S): at 12:00

## 2017-03-08 RX ADMIN — Medication 15 MILLIGRAM(S): at 06:51

## 2017-03-08 RX ADMIN — Medication 5 MILLIGRAM(S): at 11:28

## 2017-03-08 RX ADMIN — Medication 400 MILLIGRAM(S): at 13:29

## 2017-03-08 RX ADMIN — OXYCODONE HYDROCHLORIDE 5 MILLIGRAM(S): 5 TABLET ORAL at 15:36

## 2017-03-08 RX ADMIN — Medication 50 GRAM(S): at 11:42

## 2017-03-08 RX ADMIN — Medication 15 MILLIGRAM(S): at 11:28

## 2017-03-08 RX ADMIN — OXYCODONE HYDROCHLORIDE 5 MILLIGRAM(S): 5 TABLET ORAL at 16:25

## 2017-03-08 RX ADMIN — Medication 15 MILLIGRAM(S): at 06:36

## 2017-03-08 RX ADMIN — Medication 5 MILLIGRAM(S): at 06:36

## 2017-03-08 RX ADMIN — Medication 40 MILLIEQUIVALENT(S): at 08:56

## 2017-03-08 RX ADMIN — HEPARIN SODIUM 5000 UNIT(S): 5000 INJECTION INTRAVENOUS; SUBCUTANEOUS at 06:36

## 2017-03-08 NOTE — PROGRESS NOTE ADULT - SUBJECTIVE AND OBJECTIVE BOX
SUBJECTIVE:  Doing well.   No overnight events.     OBJECTIVE:     ** VITAL SIGNS / I&O's **    Vital Signs Last 24 Hrs  T(C): 36.4, Max: 36.4 (03-08 @ 05:08)  T(F): 97.5, Max: 97.5 (03-08 @ 05:08)  HR: 100 (77 - 100)  BP: 96/62 (94/51 - 112/61)  BP(mean): --  RR: 17 (16 - 17)  SpO2: 100% (98% - 100%)      I & Os for current day (as of 08 Mar 2017 07:06)  =============================================  IN:    Total IN: 0 ml  ---------------------------------------------  OUT:    Voided: 700 ml    Bulb: 40 ml    Bulb: 40 ml    Bulb: 25 ml    Total OUT: 805 ml  ---------------------------------------------  Total NET: -805 ml      ** PHYSICAL EXAM **     -- CONSTITUTIONAL: awake, alert. NAD.   -- B/L BREAST: soft, no collections, JPs serosanguinous  -- RESPIRATORY: Bilateral breath sounds.     ** LABS **                          8.9    7.3   )-----------( 192      ( 07 Mar 2017 11:09 )             26.4     07 Mar 2017 11:09    137    |  106    |  10     ----------------------------<  84     3.5     |  24     |  0.48     Ca    7.8        07 Mar 2017 11:09  Phos  3.8       07 Mar 2017 11:09  Mg     1.5       07 Mar 2017 11:09        CAPILLARY BLOOD GLUCOSE

## 2017-03-08 NOTE — PROGRESS NOTE ADULT - PROBLEM SELECTOR PLAN 1
Discussed with house staff and nursing staff.  Pain management, PT, incentive spirometer, DVT prophylaxis, repeat labs and discharge planning.  Follow up lung ct in 6-12 months.

## 2017-03-08 NOTE — PROGRESS NOTE ADULT - ASSESSMENT
27 y/o female s/p R MRM and L mass excision and TE placement bilaterally  - increase frequency of toradol, valium, and oxycodone  - standing tylenol  -dispo as per primary team

## 2017-03-08 NOTE — PROGRESS NOTE ADULT - SUBJECTIVE AND OBJECTIVE BOX
O/N: afebrile, VSS. Pain better controlled  3/7: Pain persisting, PT consulted, percocet added. Started on SQH    STATUS POST: bilateral mastectomy, Left axillary node dissection tissue expanders     POST OP DAY #: 2 O/N: afebrile, VSS. Pain better controlled    STATUS POST: bilateral mastectomy, Left axillary node dissection tissue expanders     POST OP DAY #: 2     SUBJECTIVE: Pt states pain still present but improved. Patient seen and examined bedside by chief resident.    ceFAZolin   IVPB  IV Intermittent   ceFAZolin   IVPB 1000milliGRAM(s) IV Intermittent every 8 hours  heparin  Injectable 5000Unit(s) SubCutaneous every 12 hours      Vital Signs Last 24 Hrs  T(C): 36.4, Max: 36.4 (03-08 @ 05:08)  T(F): 97.5, Max: 97.5 (03-08 @ 05:08)  HR: 100 (77 - 100)  BP: 96/62 (94/51 - 112/61)  BP(mean): --  RR: 17 (16 - 17)  SpO2: 100% (98% - 100%)  I&O's Detail  I & Os for 24h ending 07 Mar 2017 07:00  =============================================  IN:    lactated ringers.: 540 ml    IV PiggyBack: 100 ml    Total IN: 640 ml  ---------------------------------------------  OUT:    Voided: 300 ml    Bulb: 120 ml    Bulb: 90 ml    Bulb: 60 ml    Total OUT: 570 ml  ---------------------------------------------  Total NET: 70 ml    I & Os for current day (as of 08 Mar 2017 06:56)  =============================================  IN:    Total IN: 0 ml  ---------------------------------------------  OUT:    Voided: 700 ml    Bulb: 40 ml    Bulb: 40 ml    Bulb: 25 ml    Total OUT: 805 ml  ---------------------------------------------  Total NET: -805 ml      General: NAD, slightly lethargic, resting comfortably in bed  C/V: NSR  Breasts: tender to palpation, non erythematous, no signs of hematoma, JPs serosanguinous  Pulm: Nonlabored breathing, no respiratory distress  Abd: soft, NT/ND.  Extrem: WWP, no edema, SCDs in place        LABS:                        8.9    7.3   )-----------( 192      ( 07 Mar 2017 11:09 )             26.4     07 Mar 2017 11:09    137    |  106    |  10     ----------------------------<  84     3.5     |  24     |  0.48     Ca    7.8        07 Mar 2017 11:09  Phos  3.8       07 Mar 2017 11:09  Mg     1.5       07 Mar 2017 11:09            RADIOLOGY & ADDITIONAL STUDIES:

## 2017-03-08 NOTE — PROGRESS NOTE ADULT - SUBJECTIVE AND OBJECTIVE BOX
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  Patient with mass on exam and mammogram sonogram confirmed diagnosis.  Patient had two biopsies of the breast positive adenocarcinoma and Lymph glands positive for at least two.  Family history of breast cancer.  Paternal great aunt.  First pregnancy age 17.  Patient day #2 post op bilateral   mastectomies with bilateral tissue expanders with moderate pain and malaise.  Pre op Chest CT with ground glass opacity DANNIELLE.    PAST MEDICAL & SURGICAL HISTORY:  Urinary tract infection: 2016  Migraine headache allergic rhinitis  Breast cancer: left  H/O left breast biopsy: lymph node  Status post surgery: Vaccess CT power injectable (2016)  No significant past surgical history      MEDICATIONS  (STANDING):  BUpivacaine liposome 1.3% Injectable (no eMAR) 20milliLiter(s) Local Injection once  ceFAZolin   IVPB  IV Intermittent   ketorolac   Injectable 15milliGRAM(s) IV Push every 8 hours  diazepam    Tablet 5milliGRAM(s) Oral every 8 hours  ceFAZolin   IVPB 1000milliGRAM(s) IV Intermittent every 8 hours  heparin  Injectable 5000Unit(s) SubCutaneous every 12 hours  docusate sodium 100milliGRAM(s) Oral two times a day    MEDICATIONS  (PRN):  ondansetron Injectable 4milliGRAM(s) IV Push every 6 hours PRN Nausea  oxyCODONE  5 mG/acetaminophen 325 mG 1Tablet(s) Oral every 4 hours PRN Moderate Pain (4 - 6)  oxyCODONE  5 mG/acetaminophen 325 mG 2Tablet(s) Oral every 4 hours PRN Severe Pain (7 - 10)  HYDROmorphone  Injectable 0.25milliGRAM(s) IV Push every 4 hours PRN breakthrough pain      Allergies    No Known Allergies    REVIEW OF SYSTEMS    General:  malaise	  Skin/Breast: normal  Ophthalmologic: negative  ENMT:	normal  Respiratory and Thorax: normal  Cardiovascular:	normal  Gastrointestinal:	normal  Genitourinary:	normal  Musculoskeletal:  normal  Neurological:	normal  Psychiatric:	normal  Hematology/Lymphatics:	negative  Endocrine:	negative  Allergic/Immunologic:	negative      PHYSICAL EXAM:    Vital Signs Last 24 Hrs  T(C): 36.4, Max: 36.4 (03-08 @ 05:08)  T(F): 97.5, Max: 97.5 (03-08 @ 05:08)  HR: 100 (77 - 100)  BP: 96/62 (94/51 - 112/61)  BP(mean): --  RR: 17 (16 - 17)  SpO2: 100% (98% - 100%)    Constitutional: WDWNF in NAD  Eyes: conj pale  ENMT: negative  Neck: supple  Breasts: not examined   Back: negative  Respiratory: clear to P&A  Cardiovascular: no MRGT or H  Gastrointestinal: normal bowel sounds  Genitourinary: neg  Rectal: not examined  Extremities: normal  Vascular: normal  Neurological: normal  Skin: negative  Lymph Nodes: negative  Musculoskeletal:   normal  Psychiatric: anxiety                          8.9    7.3   )-----------( 192      ( 07 Mar 2017 11:09 )             26.4       07 Mar 2017 11:09    137    |  106    |  10     ----------------------------<  84     3.5     |  24     |  0.48     Ca    7.8        07 Mar 2017 11:09  Phos  3.8       07 Mar 2017 11:09  Mg     1.5       07 Mar 2017 11:09

## 2017-03-08 NOTE — DISCHARGE NOTE ADULT - CARE PLAN
Principal Discharge DX:	Breast cancer  Goal:	Full recovery  Instructions for follow-up, activity and diet:	Continue to advance diet as tolerated. You may shower, but do not bathe or submerge in water for at least two weeks. Leave steri-strips in place if present; they will fall off on their own. Please be sure to keep the wound clean and dry, changing any dressings daily (except steri-strips), unless instructed otherwise. No heavy lifting or strenuous exercise until cleared by your surgeon. Contact your doctor or go to the ER for fever > 101.5, chills, nausea, vomiting, chest pain, shortness of breath, pain not controlled by medication or excessive bleeding. Please continue to empty your CALIXTO drain regularly and record the color and volume of the output. You will discuss this with your surgeon at your follow up appointment. Please follow up with Dr. Miles in one week; you may call the office to make an appointment at your earliest convenience.

## 2017-03-08 NOTE — DISCHARGE NOTE ADULT - PLAN OF CARE
Full recovery Continue to advance diet as tolerated. You may shower, but do not bathe or submerge in water for at least two weeks. Leave steri-strips in place if present; they will fall off on their own. Please be sure to keep the wound clean and dry, changing any dressings daily (except steri-strips), unless instructed otherwise. No heavy lifting or strenuous exercise until cleared by your surgeon. Contact your doctor or go to the ER for fever > 101.5, chills, nausea, vomiting, chest pain, shortness of breath, pain not controlled by medication or excessive bleeding. Please continue to empty your CALIXTO drain regularly and record the color and volume of the output. You will discuss this with your surgeon at your follow up appointment. Please follow up with Dr. Miles in one week; you may call the office to make an appointment at your earliest convenience.

## 2017-03-08 NOTE — DISCHARGE NOTE ADULT - HOSPITAL COURSE
Patient presented for bilateral mastectomy with left axillary dissection and bilateral tissue expanders. Patient was admitted to the floor postoperatively for monitoring and for pain management. Patient's post-operative course was uncomplicated. On day of discharge, pt deemed stable and ready to return home with plan to follow up as an outpatient. Patient presented for bilateral mastectomy with left axillary dissection and bilateral tissue expanders. Patient was admitted to the floor postoperatively for monitoring and pain management. Patient's post-operative course was uncomplicated. On day of discharge, pt deemed stable and ready to return home with plan to follow up as an outpatient.

## 2017-03-08 NOTE — DISCHARGE NOTE ADULT - MEDICATION SUMMARY - MEDICATIONS TO TAKE
I will START or STAY ON the medications listed below when I get home from the hospital:    acetaminophen-oxyCODONE 325 mg-5 mg oral tablet  -- 1-2 tab(s) by mouth every 4 hours, As Needed MDD:12  -- Caution federal law prohibits the transfer of this drug to any person other  than the person for whom it was prescribed.  May cause drowsiness.  Alcohol may intensify this effect.  Use care when operating dangerous machinery.  This prescription cannot be refilled.  This product contains acetaminophen.  Do not use  with any other product containing acetaminophen to prevent possible liver damage.  Using more of this medication than prescribed may cause serious breathing problems.    -- Indication: For Severe pain    diazepam 5 mg oral tablet  -- 1 tab(s) by mouth every 8 hours, As Needed MDD:3 - for muscle spasm  -- Caution federal law prohibits the transfer of this drug to any person other  than the person for whom it was prescribed.  Do not take this drug if you are pregnant.  May cause drowsiness.  Alcohol may intensify this effect.  Use care when operating dangerous machinery.    -- Indication: For Muscle spasm    Herceptin 440 mg intravenous injection  --  intravenous every 3 weeks  -- Indication: For Home meds    docusate sodium 100 mg oral capsule  -- 1 cap(s) by mouth 2 times a day  -- Indication: For For constipation I will START or STAY ON the medications listed below when I get home from the hospital:    acetaminophen-oxyCODONE 325 mg-5 mg oral tablet  -- 1-2 tab(s) by mouth every 4 hours, As Needed MDD:12  -- Caution federal law prohibits the transfer of this drug to any person other  than the person for whom it was prescribed.  May cause drowsiness.  Alcohol may intensify this effect.  Use care when operating dangerous machinery.  This prescription cannot be refilled.  This product contains acetaminophen.  Do not use  with any other product containing acetaminophen to prevent possible liver damage.  Using more of this medication than prescribed may cause serious breathing problems.    -- Indication: For Severe pain    diazepam 5 mg oral tablet  -- 1 tab(s) by mouth every 8 hours, As Needed MDD:3 - for muscle spasm  -- Caution federal law prohibits the transfer of this drug to any person other  than the person for whom it was prescribed.  Do not take this drug if you are pregnant.  May cause drowsiness.  Alcohol may intensify this effect.  Use care when operating dangerous machinery.    -- Indication: For Muscle spasm    Keflex 500 mg oral capsule  -- 1 cap(s) by mouth 4 times a day  -- Finish all this medication unless otherwise directed by prescriber.    -- Indication: For Antibiotic    Herceptin 440 mg intravenous injection  --  intravenous every 3 weeks  -- Indication: For Home meds    docusate sodium 100 mg oral capsule  -- 1 cap(s) by mouth 2 times a day  -- Indication: For For constipation

## 2017-03-08 NOTE — DISCHARGE NOTE ADULT - NS AS ACTIVITY OBS
Stairs allowed/Walking-Indoors allowed/No Heavy lifting/straining/Walking-Outdoors allowed/Showering allowed

## 2017-03-08 NOTE — PROGRESS NOTE ADULT - ASSESSMENT
26 yoF with Left breast cancer s/p bilateral mastectomy, Left axillary node dissection, tissue expanders (03/05/2017)    Regular diet  Pain control  DVT ppx  IS  OOBA  CALIXTO x 3  Ancef

## 2017-03-08 NOTE — DISCHARGE NOTE ADULT - PATIENT PORTAL LINK FT
“You can access the Follow[employer] Patient Portal, offered by [facility], by registering with the following website: [email or web address]”

## 2017-03-13 ENCOUNTER — APPOINTMENT (OUTPATIENT)
Dept: PLASTIC SURGERY | Facility: CLINIC | Age: 27
End: 2017-03-13

## 2017-03-13 DIAGNOSIS — C50.912 MALIGNANT NEOPLASM OF UNSPECIFIED SITE OF LEFT FEMALE BREAST: ICD-10-CM

## 2017-03-13 DIAGNOSIS — F41.9 ANXIETY DISORDER, UNSPECIFIED: ICD-10-CM

## 2017-03-13 DIAGNOSIS — Z87.440 PERSONAL HISTORY OF URINARY (TRACT) INFECTIONS: ICD-10-CM

## 2017-03-13 DIAGNOSIS — C77.3 SECONDARY AND UNSPECIFIED MALIGNANT NEOPLASM OF AXILLA AND UPPER LIMB LYMPH NODES: ICD-10-CM

## 2017-03-13 DIAGNOSIS — D50.0 IRON DEFICIENCY ANEMIA SECONDARY TO BLOOD LOSS (CHRONIC): ICD-10-CM

## 2017-03-13 DIAGNOSIS — N30.00 ACUTE CYSTITIS WITHOUT HEMATURIA: ICD-10-CM

## 2017-03-13 DIAGNOSIS — Z80.3 FAMILY HISTORY OF MALIGNANT NEOPLASM OF BREAST: ICD-10-CM

## 2017-03-13 DIAGNOSIS — Z40.01 ENCOUNTER FOR PROPHYLACTIC REMOVAL OF BREAST: ICD-10-CM

## 2017-03-13 DIAGNOSIS — Z28.21 IMMUNIZATION NOT CARRIED OUT BECAUSE OF PATIENT REFUSAL: ICD-10-CM

## 2017-03-13 DIAGNOSIS — Z92.21 PERSONAL HISTORY OF ANTINEOPLASTIC CHEMOTHERAPY: ICD-10-CM

## 2017-03-13 DIAGNOSIS — G43.909 MIGRAINE, UNSPECIFIED, NOT INTRACTABLE, WITHOUT STATUS MIGRAINOSUS: ICD-10-CM

## 2017-03-13 LAB — SURGICAL PATHOLOGY STUDY: SIGNIFICANT CHANGE UP

## 2017-03-13 RX ORDER — DIAZEPAM 5 MG/1
5 TABLET ORAL
Qty: 20 | Refills: 0 | Status: COMPLETED | COMMUNITY
Start: 2017-03-13 | End: 2017-03-27

## 2017-03-13 RX ORDER — OXYCODONE AND ACETAMINOPHEN 5; 325 MG/1; MG/1
5-325 TABLET ORAL
Qty: 20 | Refills: 0 | Status: COMPLETED | COMMUNITY
Start: 2017-03-13 | End: 2017-03-27

## 2017-03-14 ENCOUNTER — APPOINTMENT (OUTPATIENT)
Dept: BREAST CENTER | Facility: CLINIC | Age: 27
End: 2017-03-14

## 2017-03-16 ENCOUNTER — APPOINTMENT (OUTPATIENT)
Dept: PLASTIC SURGERY | Facility: CLINIC | Age: 27
End: 2017-03-16

## 2017-03-16 DIAGNOSIS — N64.89 OTHER SPECIFIED DISORDERS OF BREAST: ICD-10-CM

## 2017-03-20 ENCOUNTER — APPOINTMENT (OUTPATIENT)
Dept: BREAST CENTER | Facility: CLINIC | Age: 27
End: 2017-03-20

## 2017-03-20 ENCOUNTER — APPOINTMENT (OUTPATIENT)
Dept: RADIATION ONCOLOGY | Facility: CLINIC | Age: 27
End: 2017-03-20

## 2017-03-20 VITALS
HEIGHT: 61 IN | SYSTOLIC BLOOD PRESSURE: 96 MMHG | BODY MASS INDEX: 18.24 KG/M2 | OXYGEN SATURATION: 100 % | HEART RATE: 93 BPM | WEIGHT: 96.6 LBS | DIASTOLIC BLOOD PRESSURE: 60 MMHG

## 2017-03-20 VITALS
HEIGHT: 61 IN | DIASTOLIC BLOOD PRESSURE: 54 MMHG | SYSTOLIC BLOOD PRESSURE: 107 MMHG | TEMPERATURE: 97 F | BODY MASS INDEX: 17.56 KG/M2 | HEART RATE: 69 BPM | WEIGHT: 93 LBS

## 2017-03-30 ENCOUNTER — APPOINTMENT (OUTPATIENT)
Dept: PLASTIC SURGERY | Facility: CLINIC | Age: 27
End: 2017-03-30

## 2017-04-13 ENCOUNTER — APPOINTMENT (OUTPATIENT)
Dept: PLASTIC SURGERY | Facility: CLINIC | Age: 27
End: 2017-04-13

## 2017-04-13 PROCEDURE — 88321 CONSLTJ&REPRT SLD PREP ELSWR: CPT

## 2017-04-20 ENCOUNTER — APPOINTMENT (OUTPATIENT)
Dept: RADIATION ONCOLOGY | Facility: CLINIC | Age: 27
End: 2017-04-20

## 2017-05-04 ENCOUNTER — APPOINTMENT (OUTPATIENT)
Dept: PLASTIC SURGERY | Facility: CLINIC | Age: 27
End: 2017-05-04

## 2017-05-04 RX ORDER — DOCUSATE SODIUM 100 MG/1
100 CAPSULE ORAL
Qty: 10 | Refills: 0 | Status: COMPLETED | COMMUNITY
Start: 2017-03-08

## 2017-05-25 ENCOUNTER — APPOINTMENT (OUTPATIENT)
Dept: RADIATION ONCOLOGY | Facility: CLINIC | Age: 27
End: 2017-05-25

## 2017-05-25 VITALS
SYSTOLIC BLOOD PRESSURE: 96 MMHG | HEART RATE: 91 BPM | RESPIRATION RATE: 18 BRPM | OXYGEN SATURATION: 99 % | DIASTOLIC BLOOD PRESSURE: 56 MMHG

## 2017-05-25 VITALS — BODY MASS INDEX: 18.29 KG/M2 | WEIGHT: 96.8 LBS

## 2017-05-25 RX ORDER — SILVER SULFADIAZINE 10 MG/G
1 CREAM TOPICAL TWICE DAILY
Qty: 30 | Refills: 3 | Status: DISCONTINUED | COMMUNITY
Start: 2017-04-13 | End: 2017-05-25

## 2017-06-30 ENCOUNTER — APPOINTMENT (OUTPATIENT)
Dept: BREAST CENTER | Facility: CLINIC | Age: 27
End: 2017-06-30

## 2017-08-25 ENCOUNTER — INPATIENT (INPATIENT)
Facility: HOSPITAL | Age: 27
LOS: 3 days | Discharge: HOME HEALTH | DRG: 920 | End: 2017-08-29
Attending: PLASTIC SURGERY | Admitting: PLASTIC SURGERY
Payer: COMMERCIAL

## 2017-08-25 VITALS
OXYGEN SATURATION: 100 % | HEART RATE: 81 BPM | DIASTOLIC BLOOD PRESSURE: 68 MMHG | TEMPERATURE: 98 F | SYSTOLIC BLOOD PRESSURE: 104 MMHG | HEIGHT: 61 IN | RESPIRATION RATE: 18 BRPM | WEIGHT: 93.26 LBS

## 2017-08-25 DIAGNOSIS — Z98.890 OTHER SPECIFIED POSTPROCEDURAL STATES: Chronic | ICD-10-CM

## 2017-08-25 LAB
ALBUMIN SERPL ELPH-MCNC: 4.8 G/DL — SIGNIFICANT CHANGE UP (ref 3.3–5)
ALP SERPL-CCNC: 58 U/L — SIGNIFICANT CHANGE UP (ref 40–120)
ALT FLD-CCNC: 13 U/L — SIGNIFICANT CHANGE UP (ref 10–45)
ANION GAP SERPL CALC-SCNC: 16 MMOL/L — SIGNIFICANT CHANGE UP (ref 5–17)
APTT BLD: 28.2 SEC — SIGNIFICANT CHANGE UP (ref 27.5–37.4)
AST SERPL-CCNC: 19 U/L — SIGNIFICANT CHANGE UP (ref 10–40)
BASOPHILS NFR BLD AUTO: 0.6 % — SIGNIFICANT CHANGE UP (ref 0–2)
BILIRUB SERPL-MCNC: 0.4 MG/DL — SIGNIFICANT CHANGE UP (ref 0.2–1.2)
BUN SERPL-MCNC: 25 MG/DL — HIGH (ref 7–23)
CALCIUM SERPL-MCNC: 9.5 MG/DL — SIGNIFICANT CHANGE UP (ref 8.4–10.5)
CHLORIDE SERPL-SCNC: 103 MMOL/L — SIGNIFICANT CHANGE UP (ref 96–108)
CO2 SERPL-SCNC: 22 MMOL/L — SIGNIFICANT CHANGE UP (ref 22–31)
CREAT SERPL-MCNC: 0.6 MG/DL — SIGNIFICANT CHANGE UP (ref 0.5–1.3)
EOSINOPHIL NFR BLD AUTO: 2.4 % — SIGNIFICANT CHANGE UP (ref 0–6)
GLUCOSE SERPL-MCNC: 87 MG/DL — SIGNIFICANT CHANGE UP (ref 70–99)
HCG SERPL-ACNC: <.1 MIU/ML — SIGNIFICANT CHANGE UP
HCT VFR BLD CALC: 34.8 % — SIGNIFICANT CHANGE UP (ref 34.5–45)
HGB BLD-MCNC: 11.2 G/DL — LOW (ref 11.5–15.5)
INR BLD: 1.09 — SIGNIFICANT CHANGE UP (ref 0.88–1.16)
LACTATE SERPL-SCNC: 0.9 MMOL/L — SIGNIFICANT CHANGE UP (ref 0.5–2)
LIDOCAIN IGE QN: 22 U/L — SIGNIFICANT CHANGE UP (ref 7–60)
LYMPHOCYTES # BLD AUTO: 17.8 % — SIGNIFICANT CHANGE UP (ref 13–44)
MCHC RBC-ENTMCNC: 28.7 PG — SIGNIFICANT CHANGE UP (ref 27–34)
MCHC RBC-ENTMCNC: 32.2 G/DL — SIGNIFICANT CHANGE UP (ref 32–36)
MCV RBC AUTO: 89.2 FL — SIGNIFICANT CHANGE UP (ref 80–100)
MONOCYTES NFR BLD AUTO: 13.9 % — SIGNIFICANT CHANGE UP (ref 2–14)
NEUTROPHILS NFR BLD AUTO: 65.3 % — SIGNIFICANT CHANGE UP (ref 43–77)
PLATELET # BLD AUTO: 283 K/UL — SIGNIFICANT CHANGE UP (ref 150–400)
POTASSIUM SERPL-MCNC: 3.8 MMOL/L — SIGNIFICANT CHANGE UP (ref 3.5–5.3)
POTASSIUM SERPL-SCNC: 3.8 MMOL/L — SIGNIFICANT CHANGE UP (ref 3.5–5.3)
PROT SERPL-MCNC: 8.1 G/DL — SIGNIFICANT CHANGE UP (ref 6–8.3)
PROTHROM AB SERPL-ACNC: 12.1 SEC — SIGNIFICANT CHANGE UP (ref 9.8–12.7)
RBC # BLD: 3.9 M/UL — SIGNIFICANT CHANGE UP (ref 3.8–5.2)
RBC # FLD: 13.7 % — SIGNIFICANT CHANGE UP (ref 10.3–16.9)
SODIUM SERPL-SCNC: 141 MMOL/L — SIGNIFICANT CHANGE UP (ref 135–145)
WBC # BLD: 3.3 K/UL — LOW (ref 3.8–10.5)
WBC # FLD AUTO: 3.3 K/UL — LOW (ref 3.8–10.5)

## 2017-08-25 PROCEDURE — 99285 EMERGENCY DEPT VISIT HI MDM: CPT

## 2017-08-25 RX ORDER — VANCOMYCIN HCL 1 G
750 VIAL (EA) INTRAVENOUS ONCE
Qty: 0 | Refills: 0 | Status: COMPLETED | OUTPATIENT
Start: 2017-08-25 | End: 2017-08-25

## 2017-08-25 RX ORDER — DEXTROSE MONOHYDRATE, SODIUM CHLORIDE, AND POTASSIUM CHLORIDE 50; .745; 4.5 G/1000ML; G/1000ML; G/1000ML
1000 INJECTION, SOLUTION INTRAVENOUS
Qty: 0 | Refills: 0 | Status: DISCONTINUED | OUTPATIENT
Start: 2017-08-25 | End: 2017-08-25

## 2017-08-25 RX ORDER — ONDANSETRON 8 MG/1
4 TABLET, FILM COATED ORAL EVERY 6 HOURS
Qty: 0 | Refills: 0 | Status: DISCONTINUED | OUTPATIENT
Start: 2017-08-25 | End: 2017-08-29

## 2017-08-25 RX ORDER — VENLAFAXINE HCL 75 MG
75 CAPSULE, EXT RELEASE 24 HR ORAL AT BEDTIME
Qty: 0 | Refills: 0 | Status: DISCONTINUED | OUTPATIENT
Start: 2017-08-25 | End: 2017-08-29

## 2017-08-25 RX ORDER — DOCUSATE SODIUM 100 MG
100 CAPSULE ORAL THREE TIMES A DAY
Qty: 0 | Refills: 0 | Status: DISCONTINUED | OUTPATIENT
Start: 2017-08-25 | End: 2017-08-29

## 2017-08-25 RX ORDER — CALCIUM CARBONATE 500(1250)
3 TABLET ORAL EVERY 6 HOURS
Qty: 0 | Refills: 0 | Status: DISCONTINUED | OUTPATIENT
Start: 2017-08-25 | End: 2017-08-29

## 2017-08-25 RX ORDER — PIPERACILLIN AND TAZOBACTAM 4; .5 G/20ML; G/20ML
3.38 INJECTION, POWDER, LYOPHILIZED, FOR SOLUTION INTRAVENOUS ONCE
Qty: 0 | Refills: 0 | Status: DISCONTINUED | OUTPATIENT
Start: 2017-08-25 | End: 2017-08-25

## 2017-08-25 RX ORDER — ACETAMINOPHEN 500 MG
650 TABLET ORAL EVERY 6 HOURS
Qty: 0 | Refills: 0 | Status: DISCONTINUED | OUTPATIENT
Start: 2017-08-25 | End: 2017-08-29

## 2017-08-25 RX ORDER — HEPARIN SODIUM 5000 [USP'U]/ML
5000 INJECTION INTRAVENOUS; SUBCUTANEOUS ONCE
Qty: 0 | Refills: 0 | Status: COMPLETED | OUTPATIENT
Start: 2017-08-25 | End: 2017-08-25

## 2017-08-25 RX ORDER — MORPHINE SULFATE 50 MG/1
4 CAPSULE, EXTENDED RELEASE ORAL ONCE
Qty: 0 | Refills: 0 | Status: DISCONTINUED | OUTPATIENT
Start: 2017-08-25 | End: 2017-08-25

## 2017-08-25 RX ORDER — SODIUM CHLORIDE 9 MG/ML
1000 INJECTION, SOLUTION INTRAVENOUS
Qty: 0 | Refills: 0 | Status: DISCONTINUED | OUTPATIENT
Start: 2017-08-25 | End: 2017-08-25

## 2017-08-25 RX ORDER — SODIUM CHLORIDE 9 MG/ML
1000 INJECTION, SOLUTION INTRAVENOUS ONCE
Qty: 0 | Refills: 0 | Status: DISCONTINUED | OUTPATIENT
Start: 2017-08-25 | End: 2017-08-26

## 2017-08-25 RX ORDER — OXYCODONE AND ACETAMINOPHEN 5; 325 MG/1; MG/1
1 TABLET ORAL EVERY 4 HOURS
Qty: 0 | Refills: 0 | Status: DISCONTINUED | OUTPATIENT
Start: 2017-08-25 | End: 2017-08-29

## 2017-08-25 RX ORDER — OXYCODONE AND ACETAMINOPHEN 5; 325 MG/1; MG/1
2 TABLET ORAL EVERY 6 HOURS
Qty: 0 | Refills: 0 | Status: DISCONTINUED | OUTPATIENT
Start: 2017-08-25 | End: 2017-08-29

## 2017-08-25 RX ORDER — SODIUM CHLORIDE 9 MG/ML
1000 INJECTION INTRAMUSCULAR; INTRAVENOUS; SUBCUTANEOUS ONCE
Qty: 0 | Refills: 0 | Status: COMPLETED | OUTPATIENT
Start: 2017-08-25 | End: 2017-08-25

## 2017-08-25 RX ORDER — MIRTAZAPINE 45 MG/1
15 TABLET, ORALLY DISINTEGRATING ORAL AT BEDTIME
Qty: 0 | Refills: 0 | Status: DISCONTINUED | OUTPATIENT
Start: 2017-08-25 | End: 2017-08-29

## 2017-08-25 RX ORDER — SENNA PLUS 8.6 MG/1
2 TABLET ORAL AT BEDTIME
Qty: 0 | Refills: 0 | Status: DISCONTINUED | OUTPATIENT
Start: 2017-08-25 | End: 2017-08-26

## 2017-08-25 RX ORDER — PIPERACILLIN AND TAZOBACTAM 4; .5 G/20ML; G/20ML
3.38 INJECTION, POWDER, LYOPHILIZED, FOR SOLUTION INTRAVENOUS EVERY 6 HOURS
Qty: 0 | Refills: 0 | Status: DISCONTINUED | OUTPATIENT
Start: 2017-08-25 | End: 2017-08-29

## 2017-08-25 RX ORDER — DIPHENHYDRAMINE HCL 50 MG
25 CAPSULE ORAL EVERY 4 HOURS
Qty: 0 | Refills: 0 | Status: DISCONTINUED | OUTPATIENT
Start: 2017-08-25 | End: 2017-08-29

## 2017-08-25 RX ORDER — HYDROMORPHONE HYDROCHLORIDE 2 MG/ML
0.5 INJECTION INTRAMUSCULAR; INTRAVENOUS; SUBCUTANEOUS ONCE
Qty: 0 | Refills: 0 | Status: DISCONTINUED | OUTPATIENT
Start: 2017-08-25 | End: 2017-08-25

## 2017-08-25 RX ORDER — VANCOMYCIN HCL 1 G
750 VIAL (EA) INTRAVENOUS EVERY 8 HOURS
Qty: 0 | Refills: 0 | Status: DISCONTINUED | OUTPATIENT
Start: 2017-08-25 | End: 2017-08-26

## 2017-08-25 RX ORDER — SODIUM CHLORIDE 9 MG/ML
1000 INJECTION, SOLUTION INTRAVENOUS
Qty: 0 | Refills: 0 | Status: DISCONTINUED | OUTPATIENT
Start: 2017-08-25 | End: 2017-08-26

## 2017-08-25 RX ORDER — PIPERACILLIN AND TAZOBACTAM 4; .5 G/20ML; G/20ML
3.38 INJECTION, POWDER, LYOPHILIZED, FOR SOLUTION INTRAVENOUS ONCE
Qty: 0 | Refills: 0 | Status: COMPLETED | OUTPATIENT
Start: 2017-08-25 | End: 2017-08-25

## 2017-08-25 RX ORDER — ENOXAPARIN SODIUM 100 MG/ML
30 INJECTION SUBCUTANEOUS DAILY
Qty: 0 | Refills: 0 | Status: DISCONTINUED | OUTPATIENT
Start: 2017-08-25 | End: 2017-08-25

## 2017-08-25 RX ORDER — HYDROMORPHONE HYDROCHLORIDE 2 MG/ML
0.5 INJECTION INTRAMUSCULAR; INTRAVENOUS; SUBCUTANEOUS EVERY 4 HOURS
Qty: 0 | Refills: 0 | Status: DISCONTINUED | OUTPATIENT
Start: 2017-08-25 | End: 2017-08-29

## 2017-08-25 RX ORDER — VANCOMYCIN HCL 1 G
1000 VIAL (EA) INTRAVENOUS ONCE
Qty: 0 | Refills: 0 | Status: DISCONTINUED | OUTPATIENT
Start: 2017-08-25 | End: 2017-08-25

## 2017-08-25 RX ADMIN — OXYCODONE AND ACETAMINOPHEN 2 TABLET(S): 5; 325 TABLET ORAL at 19:15

## 2017-08-25 RX ADMIN — Medication 150 MILLIGRAM(S): at 15:12

## 2017-08-25 RX ADMIN — SODIUM CHLORIDE 2000 MILLILITER(S): 9 INJECTION INTRAMUSCULAR; INTRAVENOUS; SUBCUTANEOUS at 13:44

## 2017-08-25 RX ADMIN — OXYCODONE AND ACETAMINOPHEN 2 TABLET(S): 5; 325 TABLET ORAL at 18:54

## 2017-08-25 RX ADMIN — Medication 100 MILLIGRAM(S): at 22:04

## 2017-08-25 RX ADMIN — Medication 250 MILLIGRAM(S): at 21:11

## 2017-08-25 RX ADMIN — HEPARIN SODIUM 5000 UNIT(S): 5000 INJECTION INTRAVENOUS; SUBCUTANEOUS at 22:04

## 2017-08-25 RX ADMIN — PIPERACILLIN AND TAZOBACTAM 25 GRAM(S): 4; .5 INJECTION, POWDER, LYOPHILIZED, FOR SOLUTION INTRAVENOUS at 22:04

## 2017-08-25 RX ADMIN — Medication 75 MILLIGRAM(S): at 22:03

## 2017-08-25 RX ADMIN — MORPHINE SULFATE 4 MILLIGRAM(S): 50 CAPSULE, EXTENDED RELEASE ORAL at 13:44

## 2017-08-25 RX ADMIN — HYDROMORPHONE HYDROCHLORIDE 0.5 MILLIGRAM(S): 2 INJECTION INTRAMUSCULAR; INTRAVENOUS; SUBCUTANEOUS at 15:56

## 2017-08-25 RX ADMIN — Medication 1 APPLICATION(S): at 22:04

## 2017-08-25 RX ADMIN — PIPERACILLIN AND TAZOBACTAM 200 GRAM(S): 4; .5 INJECTION, POWDER, LYOPHILIZED, FOR SOLUTION INTRAVENOUS at 16:10

## 2017-08-25 RX ADMIN — MIRTAZAPINE 15 MILLIGRAM(S): 45 TABLET, ORALLY DISINTEGRATING ORAL at 22:03

## 2017-08-25 NOTE — CONSULT NOTE ADULT - ASSESSMENT
27F with Left Breast Cancer s/p neoadjuvant chemo, b/l mastectomy and left axillary dissection with b/l tissue expander implantation and post op radiation now with concern for left breast cellulitis and concern for abscess formation with foreign body.  - Agree with IV abx  - Please obtain Breast U/S to r/o underlying fluid collection  - Would appreciate Plastics input, and possible admission  - Breast Surgery will continue to follow  - Call Team 1 with further questions  - Discussed with Team 1 Chief and Dr. Miles

## 2017-08-25 NOTE — ED CLERICAL - NS ED CLERK NOTE PRE-ARRIVAL INFORMATION; ADDITIONAL PRE-ARRIVAL INFORMATION
Norvali Nor acute outlet obstruction from urinary bladder from recurrent bladder cancer needng to be admitted to [facility] 76/5.9

## 2017-08-25 NOTE — ED PROVIDER NOTE - OBJECTIVE STATEMENT
28 y/o female with hx of breast ca c/o left breast pain. pt states finished radiation therapy 2 wks ago and developed redness and blisters which have not been healing. No fever or chills. no cp, sob, abd pain, n/v. no ha, dizziness or weakness. no further complaints.

## 2017-08-25 NOTE — H&P ADULT - ASSESSMENT
28 y/o woman w/ h/o breast CA s/p neoadjuvant chemo, bilateral mastectomy w/ TE placement in March 2017, followed by XRT which she completed on 8/14/17. Now w/ postradiation cellulitis and desquamation.  - admit to Plastic Surgery  - pain control  - restart home meds  - regular diet  - IVF 2/2 pt NPO all day & need for CT w/ IV contrast  - cont Vanc/Zosyn, f/u Vanc Trough prior to 4th dose  - Silvadene BID by nursing for wound care  - CT chest w/ IV contrast to evaluate for abscess/fluid collection    - Discussed w/ Dr. Lerman

## 2017-08-25 NOTE — H&P ADULT - NSHPSOCIALHISTORY_GEN_ALL_CORE
denies tobacco, no etoh, acknowledges smoking & vaping marijuana throughout XRT, last was approximately 2 weeks ago

## 2017-08-25 NOTE — ED ADULT NURSE NOTE - CHIEF COMPLAINT QUOTE
patient c/o left breast redness , blister and swelling for 2 weeks , had radiation therapy 08/14/17 . Was at [facility] last night and signed AMA .

## 2017-08-25 NOTE — H&P ADULT - HISTORY OF PRESENT ILLNESS
28 y/o woman w/ h/o L breast CA s/p neoadjuvant chemo, bilateral mastectomy w/ TE placement in March 2017, followed by XRT which she completed on 8/14/17. On her last day of XRT she noticed a small area of skin slough on her lateral breast which has progressively spread over the past week prompting her to seek ER evaluation on the evening of 8/24/17 was then discharged and presented to the [facility] ER on 8/25/17. She denies any fevers but notes chills. In the ER she received Vanc/Zosyn and silvadene was applied to her L breast wound. 180cc was removed from her L tissue expander. She was admitted for further work-up, pain control, and wound care.

## 2017-08-25 NOTE — ED PROVIDER NOTE - ATTENDING CONTRIBUTION TO CARE
pt w hx of breast cancer s/p mastectomy and breast expanders w c/o skin ulceration and dc mainly on L s/p radiation. no fever, sob, abd pain, cp. Agree w PA exam as above, labs reviewed. discussed w plastics, surgery, will admit to plastics. given one dose of abx here and obtaining US.

## 2017-08-25 NOTE — ED ADULT NURSE REASSESSMENT NOTE - NS ED NURSE REASSESS COMMENT FT1
27 year old female patient with L breast cancer, reconstructive surgery last year, spacers in currently.  On radiation therapy last tx 8/14 with c/o of redness and discharge from L breast.  Patient L breast noted to be swollen and red.  Patient fevers, but states chills.  Breathing easily & unlabored.  A+OX3, ambulatory w steady gait.

## 2017-08-25 NOTE — H&P ADULT - NSHPPHYSICALEXAM_GEN_ALL_CORE
L breast wound with erythema and sloughed areas of epidermis, currently with silvadene cream over wound, TTP, no appreciable fluid collection of L breast  non-labored breathing  RRR

## 2017-08-25 NOTE — ED PROVIDER NOTE - MEDICAL DECISION MAKING DETAILS
cellulitis left breast with hx of mastectomy and breast expanders in place. labs noted. vancomycin given. pt evaluated by surgery in ED and recommend u/s and plastics consult. Plastics consulted and will admit to Dr Lerman

## 2017-08-25 NOTE — CONSULT NOTE ADULT - SUBJECTIVE AND OBJECTIVE BOX
HPI: 27F with a Hx of Left Breast Cancer s/p neoadjuvant chemotherapy, subsequent B/L Mastectomy with left axillary dissection (levels 1&2) and implantation of B/L tissue expanders in March 2017.  She received postop radiation to the left breast and finished 10 days prior to arrival.  She notes that the tissue expanders have been injected twice by Dr. Lerman in the office twice since the index procedure.  She presents to the [facility] ED with a 5day hx of erythema, warmth, pain and tenderness of the left breast, with 2 day history of desquamation and weeping blisters.  She notes that the fluid draining from the small blisters is yellow.  She denies any fevers at home, but she does report rigors.  She otherwise denies CP/SOB/N/V/D/Dysuria.      PAST MEDICAL & SURGICAL HISTORY:  Urinary tract infection: 2016  Migraine headache  Breast cancer: left  H/O left breast biopsy: lymph node  Status post surgery: Vaccess CT power injectable (2016)      ROS: See HPI    MEDICATIONS: Remeron 15mg, Effexor 75mg, Herceptin every three weeks  ALLERGIES: NKDA    SOCIAL HISTORY:  Smoke: Never Smoker  EtOH: occasional  Denies IVDA    Vital Signs Last 24 Hrs  T(C): 36.9 (25 Aug 2017 15:44), Max: 36.9 (25 Aug 2017 12:58)  T(F): 98.4 (25 Aug 2017 15:44), Max: 98.5 (25 Aug 2017 12:58)  HR: 80 (25 Aug 2017 15:44) (80 - 90)  BP: 102/68 (25 Aug 2017 15:44) (100/69 - 104/68)  BP(mean): --  RR: 18 (25 Aug 2017 15:44) (18 - 18)  SpO2: 100% (25 Aug 2017 15:44) (100% - 100%)    PHYSICAL EXAM    Gen: NAD  CV: RRR  Pulm: CTAB  Chest: right breast with tissue expander, well healed incision, no overlying erythema/induration/fluctuance/ttp. No right axillary lymphadenopathy.  Left breast with diffuse erythema, ttp, possible fluctuance on the inferior/lateral aspect of the breast.  Post radiation pigmentation extending to clavicle. Desquamation overlying the breast weeping with minimal montoya fluid. No left sided axillary lymphadenopathy appreciated.  Incision well healed.    LABS:                        11.2   3.3   )-----------( 283      ( 25 Aug 2017 13:41 )             34.8     08-25    141  |  103  |  25<H>  ----------------------------<  87  3.8   |  22  |  0.60    Ca    9.5      25 Aug 2017 13:41    TPro  8.1  /  Alb  4.8  /  TBili  0.4  /  DBili  x   /  AST  19  /  ALT  13  /  AlkPhos  58  08-25    PT/INR - ( 25 Aug 2017 13:41 )   PT: 12.1 sec;   INR: 1.09          PTT - ( 25 Aug 2017 13:41 )  PTT:28.2 sec

## 2017-08-26 LAB
ANION GAP SERPL CALC-SCNC: 11 MMOL/L — SIGNIFICANT CHANGE UP (ref 5–17)
BUN SERPL-MCNC: 12 MG/DL — SIGNIFICANT CHANGE UP (ref 7–23)
CALCIUM SERPL-MCNC: 8.8 MG/DL — SIGNIFICANT CHANGE UP (ref 8.4–10.5)
CHLORIDE SERPL-SCNC: 102 MMOL/L — SIGNIFICANT CHANGE UP (ref 96–108)
CO2 SERPL-SCNC: 23 MMOL/L — SIGNIFICANT CHANGE UP (ref 22–31)
CREAT SERPL-MCNC: 0.6 MG/DL — SIGNIFICANT CHANGE UP (ref 0.5–1.3)
GLUCOSE SERPL-MCNC: 97 MG/DL — SIGNIFICANT CHANGE UP (ref 70–99)
HCT VFR BLD CALC: 31.1 % — LOW (ref 34.5–45)
HGB BLD-MCNC: 10.3 G/DL — LOW (ref 11.5–15.5)
MCHC RBC-ENTMCNC: 28.9 PG — SIGNIFICANT CHANGE UP (ref 27–34)
MCHC RBC-ENTMCNC: 33.1 G/DL — SIGNIFICANT CHANGE UP (ref 32–36)
MCV RBC AUTO: 87.1 FL — SIGNIFICANT CHANGE UP (ref 80–100)
PLATELET # BLD AUTO: 207 K/UL — SIGNIFICANT CHANGE UP (ref 150–400)
POTASSIUM SERPL-MCNC: 3.7 MMOL/L — SIGNIFICANT CHANGE UP (ref 3.5–5.3)
POTASSIUM SERPL-SCNC: 3.7 MMOL/L — SIGNIFICANT CHANGE UP (ref 3.5–5.3)
RBC # BLD: 3.57 M/UL — LOW (ref 3.8–5.2)
RBC # FLD: 13.4 % — SIGNIFICANT CHANGE UP (ref 10.3–16.9)
SODIUM SERPL-SCNC: 136 MMOL/L — SIGNIFICANT CHANGE UP (ref 135–145)
VANCOMYCIN TROUGH SERPL-MCNC: 11.4 UG/ML — SIGNIFICANT CHANGE UP (ref 10–20)
WBC # BLD: 8.5 K/UL — SIGNIFICANT CHANGE UP (ref 3.8–10.5)
WBC # FLD AUTO: 8.5 K/UL — SIGNIFICANT CHANGE UP (ref 3.8–10.5)

## 2017-08-26 PROCEDURE — 71260 CT THORAX DX C+: CPT | Mod: 26

## 2017-08-26 RX ORDER — ENOXAPARIN SODIUM 100 MG/ML
40 INJECTION SUBCUTANEOUS DAILY
Qty: 0 | Refills: 0 | Status: DISCONTINUED | OUTPATIENT
Start: 2017-08-26 | End: 2017-08-29

## 2017-08-26 RX ORDER — IBUPROFEN 200 MG
600 TABLET ORAL EVERY 8 HOURS
Qty: 0 | Refills: 0 | Status: DISCONTINUED | OUTPATIENT
Start: 2017-08-26 | End: 2017-08-29

## 2017-08-26 RX ORDER — SENNA PLUS 8.6 MG/1
2 TABLET ORAL AT BEDTIME
Qty: 0 | Refills: 0 | Status: DISCONTINUED | OUTPATIENT
Start: 2017-08-26 | End: 2017-08-29

## 2017-08-26 RX ORDER — HYDROMORPHONE HYDROCHLORIDE 2 MG/ML
1 INJECTION INTRAMUSCULAR; INTRAVENOUS; SUBCUTANEOUS
Qty: 0 | Refills: 0 | Status: DISCONTINUED | OUTPATIENT
Start: 2017-08-26 | End: 2017-08-27

## 2017-08-26 RX ORDER — SODIUM CHLORIDE 9 MG/ML
1000 INJECTION INTRAMUSCULAR; INTRAVENOUS; SUBCUTANEOUS
Qty: 0 | Refills: 0 | Status: DISCONTINUED | OUTPATIENT
Start: 2017-08-26 | End: 2017-08-26

## 2017-08-26 RX ORDER — VANCOMYCIN HCL 1 G
1250 VIAL (EA) INTRAVENOUS EVERY 12 HOURS
Qty: 0 | Refills: 0 | Status: DISCONTINUED | OUTPATIENT
Start: 2017-08-26 | End: 2017-08-29

## 2017-08-26 RX ADMIN — Medication 250 MILLIGRAM(S): at 15:49

## 2017-08-26 RX ADMIN — MIRTAZAPINE 15 MILLIGRAM(S): 45 TABLET, ORALLY DISINTEGRATING ORAL at 22:42

## 2017-08-26 RX ADMIN — Medication 100 MILLIGRAM(S): at 15:48

## 2017-08-26 RX ADMIN — Medication 166.67 MILLIGRAM(S): at 23:00

## 2017-08-26 RX ADMIN — PIPERACILLIN AND TAZOBACTAM 25 GRAM(S): 4; .5 INJECTION, POWDER, LYOPHILIZED, FOR SOLUTION INTRAVENOUS at 05:00

## 2017-08-26 RX ADMIN — PIPERACILLIN AND TAZOBACTAM 25 GRAM(S): 4; .5 INJECTION, POWDER, LYOPHILIZED, FOR SOLUTION INTRAVENOUS at 17:41

## 2017-08-26 RX ADMIN — OXYCODONE AND ACETAMINOPHEN 2 TABLET(S): 5; 325 TABLET ORAL at 05:19

## 2017-08-26 RX ADMIN — HYDROMORPHONE HYDROCHLORIDE 1 MILLIGRAM(S): 2 INJECTION INTRAMUSCULAR; INTRAVENOUS; SUBCUTANEOUS at 21:35

## 2017-08-26 RX ADMIN — Medication 1 APPLICATION(S): at 22:41

## 2017-08-26 RX ADMIN — Medication 600 MILLIGRAM(S): at 17:22

## 2017-08-26 RX ADMIN — Medication 650 MILLIGRAM(S): at 12:02

## 2017-08-26 RX ADMIN — Medication 1 APPLICATION(S): at 10:15

## 2017-08-26 RX ADMIN — Medication 75 MILLIGRAM(S): at 22:43

## 2017-08-26 RX ADMIN — HYDROMORPHONE HYDROCHLORIDE 1 MILLIGRAM(S): 2 INJECTION INTRAMUSCULAR; INTRAVENOUS; SUBCUTANEOUS at 22:05

## 2017-08-26 RX ADMIN — SENNA PLUS 2 TABLET(S): 8.6 TABLET ORAL at 22:42

## 2017-08-26 RX ADMIN — HYDROMORPHONE HYDROCHLORIDE 1 MILLIGRAM(S): 2 INJECTION INTRAMUSCULAR; INTRAVENOUS; SUBCUTANEOUS at 10:19

## 2017-08-26 RX ADMIN — OXYCODONE AND ACETAMINOPHEN 2 TABLET(S): 5; 325 TABLET ORAL at 14:01

## 2017-08-26 RX ADMIN — Medication 100 MILLIGRAM(S): at 22:42

## 2017-08-26 RX ADMIN — OXYCODONE AND ACETAMINOPHEN 2 TABLET(S): 5; 325 TABLET ORAL at 06:01

## 2017-08-26 RX ADMIN — Medication 600 MILLIGRAM(S): at 16:52

## 2017-08-26 RX ADMIN — OXYCODONE AND ACETAMINOPHEN 2 TABLET(S): 5; 325 TABLET ORAL at 19:30

## 2017-08-26 RX ADMIN — OXYCODONE AND ACETAMINOPHEN 2 TABLET(S): 5; 325 TABLET ORAL at 13:31

## 2017-08-26 RX ADMIN — HYDROMORPHONE HYDROCHLORIDE 1 MILLIGRAM(S): 2 INJECTION INTRAMUSCULAR; INTRAVENOUS; SUBCUTANEOUS at 10:04

## 2017-08-26 RX ADMIN — Medication 250 MILLIGRAM(S): at 05:00

## 2017-08-26 RX ADMIN — Medication 100 MILLIGRAM(S): at 05:01

## 2017-08-26 NOTE — PROGRESS NOTE ADULT - SUBJECTIVE AND OBJECTIVE BOX
SUBJECTIVE:  Doing well.   No overnight events.     OBJECTIVE:     ** VITAL SIGNS / I&O's **    T(C): 36.1 (08-26-17 @ 05:19), Max: 36.9 (08-25-17 @ 12:58)  T(F): 96.9 (08-26-17 @ 05:19), Max: 98.5 (08-25-17 @ 12:58)  HR: 89 (08-26-17 @ 05:19) (80 - 90)  BP: 101/69 (08-26-17 @ 05:19) (95/66 - 104/68)  RR: 15 (08-26-17 @ 05:19) (15 - 18)  SpO2: 98% (08-26-17 @ 05:19) (98% - 100%)      25 Aug 2017 07:01  -  26 Aug 2017 07:00  --------------------------------------------------------  IN:    IV PiggyBack: 600 mL    lactated ringers.: 900 mL    Oral Fluid: 80 mL  Total IN: 1580 mL    OUT:    Voided: 500 mL  Total OUT: 500 mL    Total NET: 1080 mL          ** PHYSICAL EXAM **       ** LABS **                          11.2   3.3   )-----------( 283      ( 25 Aug 2017 13:41 )             34.8     25 Aug 2017 13:41    141    |  103    |  25     ----------------------------<  87     3.8     |  22     |  0.60     Ca    9.5        25 Aug 2017 13:41    TPro  8.1    /  Alb  4.8    /  TBili  0.4    /  DBili  x      /  AST  19     /  ALT  13     /  AlkPhos  58     25 Aug 2017 13:41    PT/INR - ( 25 Aug 2017 13:41 )   PT: 12.1 sec;   INR: 1.09          PTT - ( 25 Aug 2017 13:41 )  PTT:28.2 sec  CAPILLARY BLOOD GLUCOSE SUBJECTIVE:  Doing well.   No overnight events.     OBJECTIVE:     ** VITAL SIGNS / I&O's **    T(C): 36.1 (08-26-17 @ 05:19), Max: 36.9 (08-25-17 @ 12:58)  T(F): 96.9 (08-26-17 @ 05:19), Max: 98.5 (08-25-17 @ 12:58)  HR: 89 (08-26-17 @ 05:19) (80 - 90)  BP: 101/69 (08-26-17 @ 05:19) (95/66 - 104/68)  RR: 15 (08-26-17 @ 05:19) (15 - 18)  SpO2: 98% (08-26-17 @ 05:19) (98% - 100%)      25 Aug 2017 07:01  -  26 Aug 2017 07:00  --------------------------------------------------------  IN:    IV PiggyBack: 600 mL    lactated ringers.: 900 mL    Oral Fluid: 80 mL  Total IN: 1580 mL    OUT:    Voided: 500 mL  Total OUT: 500 mL    Total NET: 1080 mL          ** PHYSICAL EXAM **   Improving erythema, areas of desquamation on L breast/chest, no fluctuance noted    ** LABS **                          11.2   3.3   )-----------( 283      ( 25 Aug 2017 13:41 )             34.8     25 Aug 2017 13:41    141    |  103    |  25     ----------------------------<  87     3.8     |  22     |  0.60     Ca    9.5        25 Aug 2017 13:41    TPro  8.1    /  Alb  4.8    /  TBili  0.4    /  DBili  x      /  AST  19     /  ALT  13     /  AlkPhos  58     25 Aug 2017 13:41    PT/INR - ( 25 Aug 2017 13:41 )   PT: 12.1 sec;   INR: 1.09          PTT - ( 25 Aug 2017 13:41 )  PTT:28.2 sec  CAPILLARY BLOOD GLUCOSE

## 2017-08-27 RX ADMIN — OXYCODONE AND ACETAMINOPHEN 2 TABLET(S): 5; 325 TABLET ORAL at 12:56

## 2017-08-27 RX ADMIN — Medication 100 MILLIGRAM(S): at 22:37

## 2017-08-27 RX ADMIN — Medication 100 MILLIGRAM(S): at 13:10

## 2017-08-27 RX ADMIN — HYDROMORPHONE HYDROCHLORIDE 1 MILLIGRAM(S): 2 INJECTION INTRAMUSCULAR; INTRAVENOUS; SUBCUTANEOUS at 08:57

## 2017-08-27 RX ADMIN — Medication 1 APPLICATION(S): at 19:29

## 2017-08-27 RX ADMIN — OXYCODONE AND ACETAMINOPHEN 2 TABLET(S): 5; 325 TABLET ORAL at 18:59

## 2017-08-27 RX ADMIN — PIPERACILLIN AND TAZOBACTAM 25 GRAM(S): 4; .5 INJECTION, POWDER, LYOPHILIZED, FOR SOLUTION INTRAVENOUS at 22:36

## 2017-08-27 RX ADMIN — Medication 1 APPLICATION(S): at 08:50

## 2017-08-27 RX ADMIN — ENOXAPARIN SODIUM 40 MILLIGRAM(S): 100 INJECTION SUBCUTANEOUS at 13:10

## 2017-08-27 RX ADMIN — OXYCODONE AND ACETAMINOPHEN 2 TABLET(S): 5; 325 TABLET ORAL at 05:51

## 2017-08-27 RX ADMIN — Medication 166.67 MILLIGRAM(S): at 10:39

## 2017-08-27 RX ADMIN — Medication 100 MILLIGRAM(S): at 08:42

## 2017-08-27 RX ADMIN — Medication 75 MILLIGRAM(S): at 22:37

## 2017-08-27 RX ADMIN — HYDROMORPHONE HYDROCHLORIDE 1 MILLIGRAM(S): 2 INJECTION INTRAMUSCULAR; INTRAVENOUS; SUBCUTANEOUS at 08:42

## 2017-08-27 RX ADMIN — PIPERACILLIN AND TAZOBACTAM 25 GRAM(S): 4; .5 INJECTION, POWDER, LYOPHILIZED, FOR SOLUTION INTRAVENOUS at 02:06

## 2017-08-27 RX ADMIN — MIRTAZAPINE 15 MILLIGRAM(S): 45 TABLET, ORALLY DISINTEGRATING ORAL at 22:37

## 2017-08-27 RX ADMIN — SENNA PLUS 2 TABLET(S): 8.6 TABLET ORAL at 22:37

## 2017-08-27 RX ADMIN — Medication 166.67 MILLIGRAM(S): at 23:40

## 2017-08-27 RX ADMIN — PIPERACILLIN AND TAZOBACTAM 25 GRAM(S): 4; .5 INJECTION, POWDER, LYOPHILIZED, FOR SOLUTION INTRAVENOUS at 14:01

## 2017-08-27 RX ADMIN — OXYCODONE AND ACETAMINOPHEN 2 TABLET(S): 5; 325 TABLET ORAL at 18:29

## 2017-08-27 RX ADMIN — OXYCODONE AND ACETAMINOPHEN 2 TABLET(S): 5; 325 TABLET ORAL at 12:26

## 2017-08-28 ENCOUNTER — TRANSCRIPTION ENCOUNTER (OUTPATIENT)
Age: 27
End: 2017-08-28

## 2017-08-28 LAB
ANION GAP SERPL CALC-SCNC: 13 MMOL/L — SIGNIFICANT CHANGE UP (ref 5–17)
BUN SERPL-MCNC: 7 MG/DL — SIGNIFICANT CHANGE UP (ref 7–23)
CALCIUM SERPL-MCNC: 9.4 MG/DL — SIGNIFICANT CHANGE UP (ref 8.4–10.5)
CHLORIDE SERPL-SCNC: 103 MMOL/L — SIGNIFICANT CHANGE UP (ref 96–108)
CO2 SERPL-SCNC: 23 MMOL/L — SIGNIFICANT CHANGE UP (ref 22–31)
CREAT SERPL-MCNC: 0.6 MG/DL — SIGNIFICANT CHANGE UP (ref 0.5–1.3)
GLUCOSE SERPL-MCNC: 101 MG/DL — HIGH (ref 70–99)
HCT VFR BLD CALC: 29.5 % — LOW (ref 34.5–45)
HGB BLD-MCNC: 10.1 G/DL — LOW (ref 11.5–15.5)
MCHC RBC-ENTMCNC: 29.8 PG — SIGNIFICANT CHANGE UP (ref 27–34)
MCHC RBC-ENTMCNC: 34.2 G/DL — SIGNIFICANT CHANGE UP (ref 32–36)
MCV RBC AUTO: 87 FL — SIGNIFICANT CHANGE UP (ref 80–100)
PLATELET # BLD AUTO: 253 K/UL — SIGNIFICANT CHANGE UP (ref 150–400)
POTASSIUM SERPL-MCNC: 3.9 MMOL/L — SIGNIFICANT CHANGE UP (ref 3.5–5.3)
POTASSIUM SERPL-SCNC: 3.9 MMOL/L — SIGNIFICANT CHANGE UP (ref 3.5–5.3)
RBC # BLD: 3.39 M/UL — LOW (ref 3.8–5.2)
RBC # FLD: 13.6 % — SIGNIFICANT CHANGE UP (ref 10.3–16.9)
SODIUM SERPL-SCNC: 139 MMOL/L — SIGNIFICANT CHANGE UP (ref 135–145)
VANCOMYCIN TROUGH SERPL-MCNC: 13.8 UG/ML — SIGNIFICANT CHANGE UP (ref 10–20)
WBC # BLD: 6.8 K/UL — SIGNIFICANT CHANGE UP (ref 3.8–10.5)
WBC # FLD AUTO: 6.8 K/UL — SIGNIFICANT CHANGE UP (ref 3.8–10.5)

## 2017-08-28 RX ADMIN — OXYCODONE AND ACETAMINOPHEN 2 TABLET(S): 5; 325 TABLET ORAL at 20:21

## 2017-08-28 RX ADMIN — PIPERACILLIN AND TAZOBACTAM 25 GRAM(S): 4; .5 INJECTION, POWDER, LYOPHILIZED, FOR SOLUTION INTRAVENOUS at 07:10

## 2017-08-28 RX ADMIN — HYDROMORPHONE HYDROCHLORIDE 0.5 MILLIGRAM(S): 2 INJECTION INTRAMUSCULAR; INTRAVENOUS; SUBCUTANEOUS at 09:39

## 2017-08-28 RX ADMIN — Medication 100 MILLIGRAM(S): at 12:43

## 2017-08-28 RX ADMIN — HYDROMORPHONE HYDROCHLORIDE 0.5 MILLIGRAM(S): 2 INJECTION INTRAMUSCULAR; INTRAVENOUS; SUBCUTANEOUS at 16:58

## 2017-08-28 RX ADMIN — OXYCODONE AND ACETAMINOPHEN 2 TABLET(S): 5; 325 TABLET ORAL at 19:27

## 2017-08-28 RX ADMIN — Medication 600 MILLIGRAM(S): at 11:57

## 2017-08-28 RX ADMIN — PIPERACILLIN AND TAZOBACTAM 200 GRAM(S): 4; .5 INJECTION, POWDER, LYOPHILIZED, FOR SOLUTION INTRAVENOUS at 17:50

## 2017-08-28 RX ADMIN — PIPERACILLIN AND TAZOBACTAM 200 GRAM(S): 4; .5 INJECTION, POWDER, LYOPHILIZED, FOR SOLUTION INTRAVENOUS at 12:43

## 2017-08-28 RX ADMIN — OXYCODONE AND ACETAMINOPHEN 2 TABLET(S): 5; 325 TABLET ORAL at 15:01

## 2017-08-28 RX ADMIN — Medication 600 MILLIGRAM(S): at 19:27

## 2017-08-28 RX ADMIN — HYDROMORPHONE HYDROCHLORIDE 0.5 MILLIGRAM(S): 2 INJECTION INTRAMUSCULAR; INTRAVENOUS; SUBCUTANEOUS at 19:54

## 2017-08-28 RX ADMIN — HYDROMORPHONE HYDROCHLORIDE 0.5 MILLIGRAM(S): 2 INJECTION INTRAMUSCULAR; INTRAVENOUS; SUBCUTANEOUS at 15:09

## 2017-08-28 RX ADMIN — OXYCODONE AND ACETAMINOPHEN 2 TABLET(S): 5; 325 TABLET ORAL at 13:30

## 2017-08-28 RX ADMIN — Medication 166.67 MILLIGRAM(S): at 22:11

## 2017-08-28 RX ADMIN — OXYCODONE AND ACETAMINOPHEN 2 TABLET(S): 5; 325 TABLET ORAL at 07:09

## 2017-08-28 RX ADMIN — Medication 75 MILLIGRAM(S): at 22:12

## 2017-08-28 RX ADMIN — Medication 650 MILLIGRAM(S): at 12:43

## 2017-08-28 RX ADMIN — Medication 25 MILLIGRAM(S): at 19:26

## 2017-08-28 RX ADMIN — OXYCODONE AND ACETAMINOPHEN 2 TABLET(S): 5; 325 TABLET ORAL at 09:10

## 2017-08-28 RX ADMIN — Medication 600 MILLIGRAM(S): at 09:39

## 2017-08-28 RX ADMIN — MIRTAZAPINE 15 MILLIGRAM(S): 45 TABLET, ORALLY DISINTEGRATING ORAL at 22:13

## 2017-08-28 RX ADMIN — Medication 1 APPLICATION(S): at 22:13

## 2017-08-28 RX ADMIN — Medication 100 MILLIGRAM(S): at 07:09

## 2017-08-28 RX ADMIN — ENOXAPARIN SODIUM 40 MILLIGRAM(S): 100 INJECTION SUBCUTANEOUS at 12:43

## 2017-08-28 RX ADMIN — Medication 1 APPLICATION(S): at 10:17

## 2017-08-28 RX ADMIN — Medication 600 MILLIGRAM(S): at 20:21

## 2017-08-28 RX ADMIN — Medication 166.67 MILLIGRAM(S): at 10:18

## 2017-08-28 RX ADMIN — HYDROMORPHONE HYDROCHLORIDE 0.5 MILLIGRAM(S): 2 INJECTION INTRAMUSCULAR; INTRAVENOUS; SUBCUTANEOUS at 11:56

## 2017-08-28 RX ADMIN — SENNA PLUS 2 TABLET(S): 8.6 TABLET ORAL at 22:11

## 2017-08-28 RX ADMIN — HYDROMORPHONE HYDROCHLORIDE 0.5 MILLIGRAM(S): 2 INJECTION INTRAMUSCULAR; INTRAVENOUS; SUBCUTANEOUS at 20:21

## 2017-08-28 RX ADMIN — Medication 100 MILLIGRAM(S): at 22:11

## 2017-08-28 RX ADMIN — Medication 650 MILLIGRAM(S): at 19:30

## 2017-08-28 NOTE — DISCHARGE NOTE ADULT - MEDICATION SUMMARY - MEDICATIONS TO STOP TAKING
I will STOP taking the medications listed below when I get home from the hospital:    acetaminophen-oxyCODONE 325 mg-5 mg oral tablet  -- 1-2 tab(s) by mouth every 4 hours, As Needed MDD:12  -- Caution federal law prohibits the transfer of this drug to any person other  than the person for whom it was prescribed.  May cause drowsiness.  Alcohol may intensify this effect.  Use care when operating dangerous machinery.  This prescription cannot be refilled.  This product contains acetaminophen.  Do not use  with any other product containing acetaminophen to prevent possible liver damage.  Using more of this medication than prescribed may cause serious breathing problems.    diazepam 5 mg oral tablet  -- 1 tab(s) by mouth every 8 hours, As Needed MDD:3 - for muscle spasm  -- Caution federal law prohibits the transfer of this drug to any person other  than the person for whom it was prescribed.  Do not take this drug if you are pregnant.  May cause drowsiness.  Alcohol may intensify this effect.  Use care when operating dangerous machinery.    Keflex 500 mg oral capsule  -- 1 cap(s) by mouth 4 times a day  -- Finish all this medication unless otherwise directed by prescriber.

## 2017-08-28 NOTE — DISCHARGE NOTE ADULT - PATIENT PORTAL LINK FT
“You can access the FollowHealth Patient Portal, offered by [facility], by registering with the following website: [email or web address]”

## 2017-08-28 NOTE — DISCHARGE NOTE ADULT - CARE PROVIDER_API CALL
Lerman, Oren Z (MD), Plastic Surgery  [address]  [address]  [place], [place] [address]  Phone: [phone]  Fax: [phone]

## 2017-08-28 NOTE — PROGRESS NOTE ADULT - SUBJECTIVE AND OBJECTIVE BOX
SUBJECTIVE:  Doing well.   No overnight events.     OBJECTIVE:     ** VITAL SIGNS / I&O's **    T(C): 37.4 (08-28-17 @ 06:40), Max: 37.4 (08-28-17 @ 06:40)  T(F): 99.4 (08-28-17 @ 06:40), Max: 99.4 (08-28-17 @ 06:40)  HR: 85 (08-28-17 @ 06:40) (82 - 86)  BP: 986/- (08-28-17 @ 06:40) (96/62 - 986/-)  RR: 17 (08-28-17 @ 06:40) (16 - 18)  SpO2: 97% (08-28-17 @ 06:40) (96% - 98%)      27 Aug 2017 07:01  -  28 Aug 2017 07:00  --------------------------------------------------------  IN:    Oral Fluid: 1000 mL    Solution: 100 mL    Solution: 250 mL  Total IN: 1350 mL    OUT:    Voided: 1100 mL  Total OUT: 1100 mL    Total NET: 250 mL          ** PHYSICAL EXAM **     -- CONSTITUTIONAL: AOx3. NAD.   -- LEFT BREAST: Left chest wall erythema, induration, and edema. (+) TTP. No collections appreciated. (+) desquamation.  -- CARDIOVASCULAR: Regular rate and rhythm. S1, S2.  -- RESPIRATORY: Bilateral breath sounds.   -- ABDOMEN: Soft. Non-tender. Non-distended.    ** LABS **      28 Aug 2017 06:27    139    |  103    |  7      ----------------------------<  101    3.9     |  23     |  0.60     Ca    9.4        28 Aug 2017 06:27        CAPILLARY BLOOD GLUCOSE

## 2017-08-28 NOTE — DISCHARGE NOTE ADULT - CARE PLAN
Principal Discharge DX:	Cellulitis of breast  Goal:	See below  Instructions for follow-up, activity and diet:	GENERAL INSTRUCTIONS:   >> Please complete your antibiotic course.  >> Pain control    WOUND CARE:  >> Change the dressing twice daily.   >> Apply silvadene to your left breast / chest wall in the area of erythema. Cover with non-adherent Telfa dressings. Cushion this dressing with gauze or Kerlix. Wrap the dressing (and your chest) with an ACE wrap.  Secondary Diagnosis:	Breast cancer

## 2017-08-28 NOTE — DISCHARGE NOTE ADULT - HOSPITAL COURSE
27F s/p neoadjuvant chemotherapy for breast cancer, bilateral mastectomies and bilateral reconstruction with Rupali (March 2017), followed by adjuvant XRT (completed 8/14/2017) presented to [facility] on 8/25/2017 with post-radiation cellulitis and desquamation of the left breast and chest wall. Imaging of the left breast and chest wall was consistent with cellulitis; no collections were appreciated. In the emergency department, Dr. Lerman removed 180cc from the left TE. The patient was admitted. IV antibiotics (vancomycin and zosyn) were initiated. Local wound care with Silvadene was initiated as well. Over the patient's hospital stay, the patient's cellulitis improved. Her hospital course was uneventful.    At the time of discharge, the patient was hemodynamically stable, was tolerating PO diet, was voiding urine, was ambulating, and was comfortable with adequate pain control. The patient was instructed to follow up with Dr. Lerman within 1 week(s) after discharge from the hospital. The patient/family felt comfortable with discharge. The patient was discharged with a prescription for oral and topical antibiotics and instructions for local wound care. The patient had no other issues.

## 2017-08-28 NOTE — DISCHARGE NOTE ADULT - MEDICATION SUMMARY - MEDICATIONS TO TAKE
I will START or STAY ON the medications listed below when I get home from the hospital:    acetaminophen 325 mg oral tablet  -- 2 tab(s) by mouth every 6 hours, As needed, For Temp greater than 38.5 C (101.3 F) or mild pain  -- Indication: For Fever and/or Pain    ibuprofen 600 mg oral tablet  -- 1 tab(s) by mouth every 8 hours, As needed, headache  -- Indication: For Headache and/or Pain    mirtazapine 15 mg oral tablet  -- 1 tab(s) by mouth once a day (at bedtime)  -- Indication: For Home Medication    venlafaxine 75 mg oral tablet  -- 1 tab(s) by mouth once a day (at bedtime)  -- Indication: For Home Medication    silver sulfADIAZINE 1% topical cream  -- Apply on skin to left breast / chest wall 2 times a day  -- For external use only.    -- Indication: For Topical antimicrobial    Herceptin 440 mg intravenous injection  --  intravenous every 3 weeks  -- Indication: For Home Medication    docusate sodium 100 mg oral capsule  -- 1 cap(s) by mouth 2 times a day  -- Indication: For Constipation as Needed    Augmentin 875 mg-125 mg oral tablet  -- 1 tab(s) by mouth every 12 hours  -- Finish all this medication unless otherwise directed by prescriber.  Take with food or milk.    -- Indication: For Left breast / chest wall cellulitis

## 2017-08-28 NOTE — DISCHARGE NOTE ADULT - PLAN OF CARE
See below GENERAL INSTRUCTIONS:   >> Please complete your antibiotic course.  >> Pain control    WOUND CARE:  >> Change the dressing twice daily.   >> Apply silvadene to your left breast / chest wall in the area of erythema. Cover with non-adherent Telfa dressings. Cushion this dressing with gauze or Kerlix. Wrap the dressing (and your chest) with an ACE wrap.

## 2017-08-28 NOTE — PROGRESS NOTE ADULT - SUBJECTIVE AND OBJECTIVE BOX
SUBJECTIVE: Patient seen and examined bedside. Reports some pain, though well controlled with medication. Denies nausea, vomiting. Denies discharge from wound. Feels that wound is improving and feels comfortable with dressing changes at home.    piperacillin/tazobactam IVPB. 3.375 Gram(s) IV Intermittent every 8 hours  vancomycin  IVPB 1250 milliGRAM(s) IV Intermittent every 12 hours      Vital Signs Last 24 Hrs  T(C): 36.8 (27 Aug 2017 21:00), Max: 36.8 (27 Aug 2017 21:00)  T(F): 98.2 (27 Aug 2017 21:00), Max: 98.2 (27 Aug 2017 21:00)  HR: 82 (27 Aug 2017 21:00) (79 - 86)  BP: 96/62 (27 Aug 2017 21:00) (96/62 - 107/71)  BP(mean): --  RR: 18 (27 Aug 2017 21:00) (16 - 18)  SpO2: 98% (27 Aug 2017 21:00) (96% - 98%)  I&O's Detail    26 Aug 2017 07:01  -  27 Aug 2017 07:00  --------------------------------------------------------  IN:    Oral Fluid: 120 mL    sodium chloride 0.9%: 351 mL    Solution: 700 mL  Total IN: 1171 mL    OUT:    Voided: 2949 mL  Total OUT: 2949 mL    Total NET: -1778 mL      27 Aug 2017 07:01  -  28 Aug 2017 06:19  --------------------------------------------------------  IN:    Oral Fluid: 1000 mL    Solution: 100 mL    Solution: 250 mL  Total IN: 1350 mL    OUT:    Voided: 1100 mL  Total OUT: 1100 mL    Total NET: 250 mL          General: NAD, resting comfortably in bed  Pulm: Nonlabored breathing, no respiratory distress on RA  Breast: Right breast WNL. Left breast with significant desquamation. No fluid collection or hematoma. Silvadene applied in thick layer over area.  Extrem: WWP, no edema, SCDs in place        LABS:                        10.3   8.5   )-----------( 207      ( 26 Aug 2017 10:50 )             31.1     08-26    136  |  102  |  12  ----------------------------<  97  3.7   |  23  |  0.60    Ca    8.8      26 Aug 2017 10:49            RADIOLOGY & ADDITIONAL STUDIES:  < from: CT Chest w/ IV Cont (08.26.17 @ 11:30) >  IMPRESSION:     Status post bilateral mastectomy and tissue expander placement. Left   tissue expander is partially decompressed. Mild left breast skin   thickening. No fluid collection.    Patchy alveolar opacification involving the left lung, likely   representing radiation pneumonitis, less likely pneumonia.       < end of copied text >

## 2017-08-29 VITALS
SYSTOLIC BLOOD PRESSURE: 101 MMHG | HEART RATE: 86 BPM | DIASTOLIC BLOOD PRESSURE: 70 MMHG | TEMPERATURE: 97 F | RESPIRATION RATE: 16 BRPM | OXYGEN SATURATION: 98 %

## 2017-08-29 LAB
HCT VFR BLD CALC: 29.4 % — LOW (ref 34.5–45)
HGB BLD-MCNC: 9.4 G/DL — LOW (ref 11.5–15.5)
MCHC RBC-ENTMCNC: 28.8 PG — SIGNIFICANT CHANGE UP (ref 27–34)
MCHC RBC-ENTMCNC: 32 G/DL — SIGNIFICANT CHANGE UP (ref 32–36)
MCV RBC AUTO: 90.2 FL — SIGNIFICANT CHANGE UP (ref 80–100)
PLATELET # BLD AUTO: 258 K/UL — SIGNIFICANT CHANGE UP (ref 150–400)
RBC # BLD: 3.26 M/UL — LOW (ref 3.8–5.2)
RBC # FLD: 13.1 % — SIGNIFICANT CHANGE UP (ref 10.3–16.9)
WBC # BLD: 3.5 K/UL — LOW (ref 3.8–10.5)
WBC # FLD AUTO: 3.5 K/UL — LOW (ref 3.8–10.5)

## 2017-08-29 PROCEDURE — 71260 CT THORAX DX C+: CPT

## 2017-08-29 PROCEDURE — 36415 COLL VENOUS BLD VENIPUNCTURE: CPT

## 2017-08-29 PROCEDURE — 99285 EMERGENCY DEPT VISIT HI MDM: CPT | Mod: 25

## 2017-08-29 PROCEDURE — 80048 BASIC METABOLIC PNL TOTAL CA: CPT

## 2017-08-29 PROCEDURE — 85025 COMPLETE CBC W/AUTO DIFF WBC: CPT

## 2017-08-29 PROCEDURE — 80053 COMPREHEN METABOLIC PANEL: CPT

## 2017-08-29 PROCEDURE — 83605 ASSAY OF LACTIC ACID: CPT

## 2017-08-29 PROCEDURE — 96375 TX/PRO/DX INJ NEW DRUG ADDON: CPT

## 2017-08-29 PROCEDURE — 84702 CHORIONIC GONADOTROPIN TEST: CPT

## 2017-08-29 PROCEDURE — 85027 COMPLETE CBC AUTOMATED: CPT

## 2017-08-29 PROCEDURE — 83690 ASSAY OF LIPASE: CPT

## 2017-08-29 PROCEDURE — 87040 BLOOD CULTURE FOR BACTERIA: CPT

## 2017-08-29 PROCEDURE — 85730 THROMBOPLASTIN TIME PARTIAL: CPT

## 2017-08-29 PROCEDURE — 80202 ASSAY OF VANCOMYCIN: CPT

## 2017-08-29 PROCEDURE — 85610 PROTHROMBIN TIME: CPT

## 2017-08-29 PROCEDURE — 96374 THER/PROPH/DIAG INJ IV PUSH: CPT

## 2017-08-29 RX ORDER — IBUPROFEN 200 MG
1 TABLET ORAL
Qty: 0 | Refills: 0 | COMMUNITY
Start: 2017-08-29

## 2017-08-29 RX ORDER — VENLAFAXINE HCL 75 MG
1 CAPSULE, EXT RELEASE 24 HR ORAL
Qty: 0 | Refills: 0 | COMMUNITY
Start: 2017-08-29

## 2017-08-29 RX ORDER — ACETAMINOPHEN 500 MG
2 TABLET ORAL
Qty: 0 | Refills: 0 | COMMUNITY
Start: 2017-08-29

## 2017-08-29 RX ORDER — MIRTAZAPINE 45 MG/1
1 TABLET, ORALLY DISINTEGRATING ORAL
Qty: 0 | Refills: 0 | COMMUNITY
Start: 2017-08-29

## 2017-08-29 RX ADMIN — OXYCODONE AND ACETAMINOPHEN 2 TABLET(S): 5; 325 TABLET ORAL at 02:15

## 2017-08-29 RX ADMIN — HYDROMORPHONE HYDROCHLORIDE 0.5 MILLIGRAM(S): 2 INJECTION INTRAMUSCULAR; INTRAVENOUS; SUBCUTANEOUS at 09:23

## 2017-08-29 RX ADMIN — HYDROMORPHONE HYDROCHLORIDE 0.5 MILLIGRAM(S): 2 INJECTION INTRAMUSCULAR; INTRAVENOUS; SUBCUTANEOUS at 00:04

## 2017-08-29 RX ADMIN — OXYCODONE AND ACETAMINOPHEN 2 TABLET(S): 5; 325 TABLET ORAL at 07:32

## 2017-08-29 RX ADMIN — PIPERACILLIN AND TAZOBACTAM 200 GRAM(S): 4; .5 INJECTION, POWDER, LYOPHILIZED, FOR SOLUTION INTRAVENOUS at 06:33

## 2017-08-29 RX ADMIN — HYDROMORPHONE HYDROCHLORIDE 0.5 MILLIGRAM(S): 2 INJECTION INTRAMUSCULAR; INTRAVENOUS; SUBCUTANEOUS at 00:39

## 2017-08-29 RX ADMIN — Medication 600 MILLIGRAM(S): at 05:03

## 2017-08-29 RX ADMIN — Medication 100 MILLIGRAM(S): at 06:33

## 2017-08-29 RX ADMIN — OXYCODONE AND ACETAMINOPHEN 2 TABLET(S): 5; 325 TABLET ORAL at 01:32

## 2017-08-29 RX ADMIN — Medication 600 MILLIGRAM(S): at 05:44

## 2017-08-29 RX ADMIN — PIPERACILLIN AND TAZOBACTAM 200 GRAM(S): 4; .5 INJECTION, POWDER, LYOPHILIZED, FOR SOLUTION INTRAVENOUS at 01:32

## 2017-08-29 RX ADMIN — Medication 1 APPLICATION(S): at 11:42

## 2017-08-29 RX ADMIN — HYDROMORPHONE HYDROCHLORIDE 0.5 MILLIGRAM(S): 2 INJECTION INTRAMUSCULAR; INTRAVENOUS; SUBCUTANEOUS at 05:03

## 2017-08-29 RX ADMIN — OXYCODONE AND ACETAMINOPHEN 2 TABLET(S): 5; 325 TABLET ORAL at 07:58

## 2017-08-29 RX ADMIN — HYDROMORPHONE HYDROCHLORIDE 0.5 MILLIGRAM(S): 2 INJECTION INTRAMUSCULAR; INTRAVENOUS; SUBCUTANEOUS at 09:08

## 2017-08-29 RX ADMIN — Medication 166.67 MILLIGRAM(S): at 10:17

## 2017-08-29 RX ADMIN — HYDROMORPHONE HYDROCHLORIDE 0.5 MILLIGRAM(S): 2 INJECTION INTRAMUSCULAR; INTRAVENOUS; SUBCUTANEOUS at 05:44

## 2017-08-29 NOTE — PROGRESS NOTE ADULT - ASSESSMENT
27F s/p neoadjuvant chemotherapy for breast cancer, bilateral mastectomies and bilateral reconstruction with Rupali (March 2017), followed by adjuvant XRT (completed 8/14/2017); c/b  post-radiation cellulitis and desquamation.   >> Continues to have diffuse cellulitis of the left chest wall. Would likely benefit from 1-2 more days of IV antibiotics.   >> Continue Vancomycin and Zosyn  >> Vancomycin trough due at 11AM today; spoke with RN about obtaining the vancomycin trough promptly.  >> Continue silvadene BID to left breast / chest wall --- AM application to be performed by plastic surgery service; PM application to be performed by RNs  >> Regular diet  >> Pain control  >> HOB elevation  >> DVT prophylaxis
26 y/o woman w/ h/o breast CA s/p neoadjuvant chemo, bilateral mastectomy w/ TE placement in March 2017, followed by XRT which she completed on 8/14/17. Now w/ postradiation cellulitis and desquamation, improving on abx and silvadene dressing changes.  - pain control  - regular diet  - cont Vanc/Zosyn, f/u Vanc Trough prior to 4th dose  - Silvadene BID by nursing for wound care  - discharge planning
27F s/p neoadjuvant chemotherapy for breast cancer, bilateral mastectomies and bilateral reconstruction with Rupali (March 2017), followed by adjuvant XRT (completed 8/14/2017); c/b  post-radiation cellulitis and desquamation.   >> Erythema in area of desquamation improved slightly compared to yesterday.   >> Continue Vancomycin and Zosyn for now  >> Vancomycin trough due this evening  >> Continue silvadene BID to left breast / chest wall --- AM application to be performed by plastic surgery service; PM application to be performed by RNs  >> Regular diet  >> Pain control  >> HOB elevation  >> DVT prophylaxis  >> After discharge, plan to transition to augmentin BID x10 days  >> Dispo. planning
27F with left breast cancer s/p neoadjuvant chemo, b/l mastectomy and left axillary dissection with b/l tissue expander implantation and post op radiation now with post-radiation cellulitis    Per CT Chest, no fluid collection to drain. Wound appears to be healing well. Agree with continuing Silvadene BID dressing changes.  No surgical intervention indicated at this time.  Please have pt follow up with Dr. Miles in the office in 1-2 weeks.  Discussed with Dr. Miles
27F with left breast cancer s/p neoadjuvant chemo, b/l mastectomy, left axillary dissection, b/l tissue expander implantation, and post-operative radiation now with likely radiation-induced cellulitis    Skin is healing well; agree with continuing abx and BID Silvadene dressing changes  Concern for possible fluid collection; would recommend obtaining left breast US to rule out drainable fluid collection prior to DC  Please have pt follow up with Dr. Miles 1-2 weeks after discharge  Discussed with Dr. Miles
28 y/o woman w/ h/o breast CA s/p neoadjuvant chemo, bilateral mastectomy w/ TE placement in March 2017, followed by XRT which she completed on 8/14/17. Now w/ postradiation cellulitis and desquamation.  - pain control  - regular diet  - NS IVF 3cc/kg 1 hr prior to CT, 1cc/kg x 6 hours post CT scan  - cont Vanc/Zosyn, f/u Vanc Trough prior to 4th dose  - Silvadene BID by nursing for wound care  - pending CT chest w/ IV contrast to evaluate for abscess/fluid collection    - Discussed w/ Dr. Lerman

## 2017-08-29 NOTE — PROGRESS NOTE ADULT - SUBJECTIVE AND OBJECTIVE BOX
SUBJECTIVE:  Doing well.   No overnight events.     OBJECTIVE:     ** VITAL SIGNS / I&O's **    T(C): 36.3 (08-29-17 @ 05:25), Max: 36.9 (08-28-17 @ 17:43)  T(F): 97.4 (08-29-17 @ 05:25), Max: 98.4 (08-28-17 @ 17:43)  HR: 67 (08-29-17 @ 05:25) (67 - 84)  BP: 100/67 (08-29-17 @ 05:25) (95/59 - 105/71)  RR: 16 (08-29-17 @ 05:25) (16 - 17)  SpO2: 98% (08-29-17 @ 05:25) (96% - 98%)      27 Aug 2017 07:01  -  28 Aug 2017 07:00  --------------------------------------------------------  IN:    Oral Fluid: 1000 mL    Solution: 100 mL    Solution: 250 mL  Total IN: 1350 mL    OUT:    Voided: 1100 mL  Total OUT: 1100 mL    Total NET: 250 mL      28 Aug 2017 07:01  -  29 Aug 2017 06:58  --------------------------------------------------------  IN:    Oral Fluid: 360 mL    Solution: 100 mL    Solution: 250 mL  Total IN: 710 mL    OUT:    Voided: 800 mL  Total OUT: 800 mL    Total NET: -90 mL          ** PHYSICAL EXAM **     -- CONSTITUTIONAL: AOx3. NAD.   -- LEFT BREAST: (+) desquamation over anterior and superior chest wall / breast. Erythema in area of desquamation (less intense versus yesterday). Tender to palpation. (+) edema. No collections appreciated.   -- CARDIOVASCULAR: Regular rate and rhythm. S1, S2.  -- RESPIRATORY: Bilateral breath sounds.   -- ABDOMEN: Soft. Non-tender. Non-distended.

## 2017-08-30 LAB
CULTURE RESULTS: SIGNIFICANT CHANGE UP
CULTURE RESULTS: SIGNIFICANT CHANGE UP
SPECIMEN SOURCE: SIGNIFICANT CHANGE UP
SPECIMEN SOURCE: SIGNIFICANT CHANGE UP

## 2017-09-01 ENCOUNTER — OUTPATIENT (OUTPATIENT)
Dept: OUTPATIENT SERVICES | Facility: HOSPITAL | Age: 27
LOS: 1 days | End: 2017-09-01
Payer: COMMERCIAL

## 2017-09-01 DIAGNOSIS — Z79.82 LONG TERM (CURRENT) USE OF ASPIRIN: ICD-10-CM

## 2017-09-01 DIAGNOSIS — L03.313 CELLULITIS OF CHEST WALL: ICD-10-CM

## 2017-09-01 DIAGNOSIS — Z98.890 OTHER SPECIFIED POSTPROCEDURAL STATES: Chronic | ICD-10-CM

## 2017-09-01 DIAGNOSIS — N61.0 MASTITIS WITHOUT ABSCESS: ICD-10-CM
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
## 2017-09-01 DIAGNOSIS — T85.49XA OTHER MECHANICAL COMPLICATION OF BREAST PROSTHESIS AND IMPLANT, INITIAL ENCOUNTER: ICD-10-CM

## 2017-09-01 DIAGNOSIS — G43.909 MIGRAINE, UNSPECIFIED, NOT INTRACTABLE, WITHOUT STATUS MIGRAINOSUS: ICD-10-CM

## 2017-09-01 DIAGNOSIS — Z85.3 PERSONAL HISTORY OF MALIGNANT NEOPLASM OF BREAST: ICD-10-CM

## 2017-09-01 DIAGNOSIS — T21.01XS BURN OF UNSPECIFIED DEGREE OF CHEST WALL, SEQUELA: ICD-10-CM
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
## 2017-09-01 DIAGNOSIS — Z92.3 PERSONAL HISTORY OF IRRADIATION: ICD-10-CM

## 2017-09-01 DIAGNOSIS — L76.82 OTHER POSTPROCEDURAL COMPLICATIONS OF SKIN AND SUBCUTANEOUS TISSUE: ICD-10-CM

## 2017-09-01 DIAGNOSIS — Z92.21 PERSONAL HISTORY OF ANTINEOPLASTIC CHEMOTHERAPY: ICD-10-CM

## 2017-09-01 DIAGNOSIS — G44.89 OTHER HEADACHE SYNDROME: ICD-10-CM

## 2017-09-01 DIAGNOSIS — F12.90 CANNABIS USE, UNSPECIFIED, UNCOMPLICATED: ICD-10-CM

## 2017-09-01 DIAGNOSIS — Z90.13 ACQUIRED ABSENCE OF BILATERAL BREASTS AND NIPPLES: ICD-10-CM

## 2017-09-01 PROCEDURE — 78472 GATED HEART PLANAR SINGLE: CPT

## 2017-09-01 PROCEDURE — 78472 GATED HEART PLANAR SINGLE: CPT | Mod: 26

## 2017-09-01 PROCEDURE — A9560: CPT

## 2017-11-15 ENCOUNTER — APPOINTMENT (OUTPATIENT)
Dept: PLASTIC SURGERY | Facility: CLINIC | Age: 27
End: 2017-11-15
Payer: COMMERCIAL

## 2017-11-15 ENCOUNTER — APPOINTMENT (OUTPATIENT)
Dept: BREAST CENTER | Facility: CLINIC | Age: 27
End: 2017-11-15
Payer: COMMERCIAL

## 2017-11-15 ENCOUNTER — OUTPATIENT (OUTPATIENT)
Dept: OUTPATIENT SERVICES | Facility: HOSPITAL | Age: 27
LOS: 1 days | End: 2017-11-15
Payer: COMMERCIAL

## 2017-11-15 VITALS — HEIGHT: 61 IN | WEIGHT: 100 LBS | BODY MASS INDEX: 18.88 KG/M2

## 2017-11-15 VITALS — HEART RATE: 94 BPM | DIASTOLIC BLOOD PRESSURE: 72 MMHG | SYSTOLIC BLOOD PRESSURE: 109 MMHG

## 2017-11-15 DIAGNOSIS — Z12.31 ENCOUNTER FOR SCREENING MAMMOGRAM FOR MALIGNANT NEOPLASM OF BREAST: ICD-10-CM

## 2017-11-15 DIAGNOSIS — Z98.890 OTHER SPECIFIED POSTPROCEDURAL STATES: Chronic | ICD-10-CM

## 2017-11-15 PROCEDURE — 99213 OFFICE O/P EST LOW 20 MIN: CPT

## 2017-11-15 PROCEDURE — 71260 CT THORAX DX C+: CPT

## 2017-11-15 PROCEDURE — 74177 CT ABD & PELVIS W/CONTRAST: CPT

## 2017-11-15 PROCEDURE — 71260 CT THORAX DX C+: CPT | Mod: 26

## 2017-11-15 PROCEDURE — 74177 CT ABD & PELVIS W/CONTRAST: CPT | Mod: 26

## 2017-12-07 ENCOUNTER — APPOINTMENT (OUTPATIENT)
Dept: PLASTIC SURGERY | Facility: CLINIC | Age: 27
End: 2017-12-07

## 2018-01-18 ENCOUNTER — APPOINTMENT (OUTPATIENT)
Dept: PLASTIC SURGERY | Facility: CLINIC | Age: 28
End: 2018-01-18
Payer: COMMERCIAL

## 2018-01-18 DIAGNOSIS — T85.44XA CAPSULAR CONTRACTURE OF BREAST IMPLANT, INITIAL ENCOUNTER: ICD-10-CM

## 2018-01-18 DIAGNOSIS — Z85.3 PERSONAL HISTORY OF MALIGNANT NEOPLASM OF BREAST: ICD-10-CM

## 2018-01-18 PROCEDURE — 99214 OFFICE O/P EST MOD 30 MIN: CPT

## 2018-01-24 ENCOUNTER — OUTPATIENT (OUTPATIENT)
Dept: OUTPATIENT SERVICES | Facility: HOSPITAL | Age: 28
LOS: 1 days | End: 2018-01-24

## 2018-01-24 DIAGNOSIS — Z98.890 OTHER SPECIFIED POSTPROCEDURAL STATES: Chronic | ICD-10-CM

## 2018-02-15 ENCOUNTER — FORM ENCOUNTER (OUTPATIENT)
Age: 28
End: 2018-02-15

## 2018-02-16 ENCOUNTER — OUTPATIENT (OUTPATIENT)
Dept: OUTPATIENT SERVICES | Facility: HOSPITAL | Age: 28
LOS: 1 days | End: 2018-02-16
Payer: COMMERCIAL

## 2018-02-16 ENCOUNTER — APPOINTMENT (OUTPATIENT)
Dept: CT IMAGING | Facility: HOSPITAL | Age: 28
End: 2018-02-16

## 2018-02-16 ENCOUNTER — APPOINTMENT (OUTPATIENT)
Dept: INTERVENTIONAL RADIOLOGY/VASCULAR | Facility: HOSPITAL | Age: 28
End: 2018-02-16

## 2018-02-16 DIAGNOSIS — Z01.818 ENCOUNTER FOR OTHER PREPROCEDURAL EXAMINATION: ICD-10-CM

## 2018-02-16 DIAGNOSIS — C50.919 MALIGNANT NEOPLASM OF UNSPECIFIED SITE OF UNSPECIFIED FEMALE BREAST: ICD-10-CM

## 2018-02-16 DIAGNOSIS — Z98.890 OTHER SPECIFIED POSTPROCEDURAL STATES: Chronic | ICD-10-CM

## 2018-02-16 DIAGNOSIS — Z45.2 ENCOUNTER FOR ADJUSTMENT AND MANAGEMENT OF VASCULAR ACCESS DEVICE: ICD-10-CM

## 2018-02-16 PROCEDURE — 74174 CTA ABD&PLVS W/CONTRAST: CPT

## 2018-02-16 PROCEDURE — 77001 FLUOROGUIDE FOR VEIN DEVICE: CPT | Mod: 26

## 2018-02-16 PROCEDURE — C1751: CPT

## 2018-02-16 PROCEDURE — 77001 FLUOROGUIDE FOR VEIN DEVICE: CPT

## 2018-02-16 PROCEDURE — 74174 CTA ABD&PLVS W/CONTRAST: CPT | Mod: 26

## 2018-02-16 PROCEDURE — 76937 US GUIDE VASCULAR ACCESS: CPT

## 2018-02-16 PROCEDURE — 36569 INSJ PICC 5 YR+ W/O IMAGING: CPT

## 2018-02-16 PROCEDURE — 76937 US GUIDE VASCULAR ACCESS: CPT | Mod: 26

## 2018-03-06 PROBLEM — T85.44XA CAPSULAR CONTRACTURE OF BREAST IMPLANT, INITIAL ENCOUNTER: Status: ACTIVE | Noted: 2018-03-06

## 2018-03-07 ENCOUNTER — FORM ENCOUNTER (OUTPATIENT)
Age: 28
End: 2018-03-07

## 2018-03-08 ENCOUNTER — APPOINTMENT (OUTPATIENT)
Dept: SURGERY | Facility: CLINIC | Age: 28
End: 2018-03-08

## 2018-03-08 ENCOUNTER — OUTPATIENT (OUTPATIENT)
Dept: OUTPATIENT SERVICES | Facility: HOSPITAL | Age: 28
LOS: 1 days | End: 2018-03-08
Payer: COMMERCIAL

## 2018-03-08 VITALS
SYSTOLIC BLOOD PRESSURE: 115 MMHG | OXYGEN SATURATION: 96 % | HEIGHT: 61 IN | HEART RATE: 90 BPM | WEIGHT: 101.85 LBS | TEMPERATURE: 99 F | DIASTOLIC BLOOD PRESSURE: 73 MMHG | RESPIRATION RATE: 16 BRPM

## 2018-03-08 DIAGNOSIS — Z01.818 ENCOUNTER FOR OTHER PREPROCEDURAL EXAMINATION: ICD-10-CM

## 2018-03-08 DIAGNOSIS — Z85.3 PERSONAL HISTORY OF MALIGNANT NEOPLASM OF BREAST: ICD-10-CM

## 2018-03-08 DIAGNOSIS — C50.912 MALIGNANT NEOPLASM OF UNSPECIFIED SITE OF LEFT FEMALE BREAST: ICD-10-CM

## 2018-03-08 DIAGNOSIS — Z98.890 OTHER SPECIFIED POSTPROCEDURAL STATES: Chronic | ICD-10-CM

## 2018-03-08 DIAGNOSIS — Z42.1 ENCOUNTER FOR BREAST RECONSTRUCTION FOLLOWING MASTECTOMY: ICD-10-CM

## 2018-03-08 DIAGNOSIS — Z95.828 PRESENCE OF OTHER VASCULAR IMPLANTS AND GRAFTS: Chronic | ICD-10-CM

## 2018-03-08 DIAGNOSIS — Z90.13 ACQUIRED ABSENCE OF BILATERAL BREASTS AND NIPPLES: ICD-10-CM

## 2018-03-08 LAB
ALBUMIN SERPL ELPH-MCNC: 4.5 G/DL — SIGNIFICANT CHANGE UP (ref 3.3–5)
ALP SERPL-CCNC: 46 U/L — SIGNIFICANT CHANGE UP (ref 40–120)
ALT FLD-CCNC: 14 U/L — SIGNIFICANT CHANGE UP (ref 10–45)
ANION GAP SERPL CALC-SCNC: 17 MMOL/L — SIGNIFICANT CHANGE UP (ref 5–17)
AST SERPL-CCNC: 17 U/L — SIGNIFICANT CHANGE UP (ref 10–40)
BASOPHILS NFR BLD AUTO: 0.3 % — SIGNIFICANT CHANGE UP (ref 0–2)
BILIRUB SERPL-MCNC: 0.5 MG/DL — SIGNIFICANT CHANGE UP (ref 0.2–1.2)
BUN SERPL-MCNC: 19 MG/DL — SIGNIFICANT CHANGE UP (ref 7–23)
CALCIUM SERPL-MCNC: 9.2 MG/DL — SIGNIFICANT CHANGE UP (ref 8.4–10.5)
CHLORIDE SERPL-SCNC: 100 MMOL/L — SIGNIFICANT CHANGE UP (ref 96–108)
CO2 SERPL-SCNC: 17 MMOL/L — LOW (ref 22–31)
CREAT SERPL-MCNC: 0.52 MG/DL — SIGNIFICANT CHANGE UP (ref 0.5–1.3)
EOSINOPHIL NFR BLD AUTO: 1.8 % — SIGNIFICANT CHANGE UP (ref 0–6)
GLUCOSE SERPL-MCNC: 85 MG/DL — SIGNIFICANT CHANGE UP (ref 70–99)
HCG SERPL-ACNC: <.1 MIU/ML — SIGNIFICANT CHANGE UP
HCT VFR BLD CALC: 38.5 % — SIGNIFICANT CHANGE UP (ref 34.5–45)
HGB BLD-MCNC: 12.9 G/DL — SIGNIFICANT CHANGE UP (ref 11.5–15.5)
LYMPHOCYTES # BLD AUTO: 16.1 % — SIGNIFICANT CHANGE UP (ref 13–44)
MCHC RBC-ENTMCNC: 29.9 PG — SIGNIFICANT CHANGE UP (ref 27–34)
MCHC RBC-ENTMCNC: 33.5 G/DL — SIGNIFICANT CHANGE UP (ref 32–36)
MCV RBC AUTO: 89.3 FL — SIGNIFICANT CHANGE UP (ref 80–100)
MONOCYTES NFR BLD AUTO: 7.6 % — SIGNIFICANT CHANGE UP (ref 2–14)
NEUTROPHILS NFR BLD AUTO: 74.2 % — SIGNIFICANT CHANGE UP (ref 43–77)
PLATELET # BLD AUTO: 304 K/UL — SIGNIFICANT CHANGE UP (ref 150–400)
POTASSIUM SERPL-MCNC: 3.7 MMOL/L — SIGNIFICANT CHANGE UP (ref 3.5–5.3)
POTASSIUM SERPL-SCNC: 3.7 MMOL/L — SIGNIFICANT CHANGE UP (ref 3.5–5.3)
PROT SERPL-MCNC: 8 G/DL — SIGNIFICANT CHANGE UP (ref 6–8.3)
RBC # BLD: 4.31 M/UL — SIGNIFICANT CHANGE UP (ref 3.8–5.2)
RBC # FLD: 12.6 % — SIGNIFICANT CHANGE UP (ref 10.3–16.9)
SODIUM SERPL-SCNC: 134 MMOL/L — LOW (ref 135–145)
WBC # BLD: 6.8 K/UL — SIGNIFICANT CHANGE UP (ref 3.8–10.5)
WBC # FLD AUTO: 6.8 K/UL — SIGNIFICANT CHANGE UP (ref 3.8–10.5)

## 2018-03-08 PROCEDURE — 71046 X-RAY EXAM CHEST 2 VIEWS: CPT | Mod: 26

## 2018-03-08 PROCEDURE — 93010 ELECTROCARDIOGRAM REPORT: CPT

## 2018-03-08 RX ORDER — TRASTUZUMAB-DKST 420 MG/20ML
0 INJECTION, POWDER, LYOPHILIZED, FOR SOLUTION INTRAVENOUS
Qty: 0 | Refills: 0 | COMMUNITY

## 2018-03-08 NOTE — PATIENT PROFILE ADULT. - PMH
Anxiety    Breast cancer  left  Depression    Migraine headache    Urinary tract infection  2016 Anxiety    Breast cancer  left, chemo& RT colpleted 12/2017  Depression    Migraine headache    Urinary tract infection  2016

## 2018-03-08 NOTE — H&P PST ADULT - LAB RESULTS AND INTERPRETATION
Pt is a hard stick and unable to obtain PTT/PT/INR specimen after multiple attempts by phlebotomist, Dayanara Kessler. Spoke to Nickie to notify Dr. Lerman. As per Nickie, PA says fine and can be done on day of surgery via PICC line access if needed.

## 2018-03-08 NOTE — H&P PST ADULT - PSH
H/O left breast biopsy  lymph node  H/O mastectomy, bilateral  3/8/2017-with bilateral tissue expander placement and left arm lymph node dissection  S/P PICC central line placement  2/16/2018  Status post surgery  Vaccess CT power injectable (2016)

## 2018-03-08 NOTE — PATIENT PROFILE ADULT. - PSH
H/O left breast biopsy  lymph node  Status post surgery  Vaccess CT power injectable (2016)  Surgery, elective  double mastectomy- 3/8/2017 ( had radiation for 3 months ) H/O left breast biopsy  lymph node  H/O mastectomy, bilateral  3/8/2017-with bilateral tissue expander placement and left arm lymph node dissection  S/P PICC central line placement  2/16/2018, right  Status post surgery  Vaccess CT power injectable (2016)

## 2018-03-08 NOTE — H&P PST ADULT - HISTORY OF PRESENT ILLNESS
27 year old female with h/o left breast carcinoma (2016) s/p neoadjuvant chemo then bilateral mastectomy with tissue expander placement (3/2017) followed by XRT which was completed (8/14/17). IR procedure (2/16/18) for PICC line placement via right brachial vein under ultrasound and fluoroscopic guidance. Patient presents today for pre-op history and physical exam prior to bilateral IKER flap breast reconstruction.

## 2018-03-08 NOTE — H&P PST ADULT - NS MD HP INPLANTS MED DEV
Vascular access device/right arm PICC line; Vaccess port; bilateral tissue expander with metallic component

## 2018-03-13 VITALS
OXYGEN SATURATION: 100 % | RESPIRATION RATE: 16 BRPM | TEMPERATURE: 98 F | SYSTOLIC BLOOD PRESSURE: 102 MMHG | DIASTOLIC BLOOD PRESSURE: 65 MMHG | WEIGHT: 102.96 LBS | HEIGHT: 61 IN | HEART RATE: 99 BPM

## 2018-03-13 RX ORDER — INFLUENZA VIRUS VACCINE 15; 15; 15; 15 UG/.5ML; UG/.5ML; UG/.5ML; UG/.5ML
0.5 SUSPENSION INTRAMUSCULAR ONCE
Qty: 0 | Refills: 0 | Status: DISCONTINUED | OUTPATIENT
Start: 2018-03-14 | End: 2018-03-20

## 2018-03-13 NOTE — PRE-OP CHECKLIST - INTERNAL PROSTHESES
yes(specify)/tissue expander bilateral breast yes(specify)/tissue expander bilateral breast, PICC line Right upper arm, Chemo port, Right upper chest

## 2018-03-14 ENCOUNTER — INPATIENT (INPATIENT)
Facility: HOSPITAL | Age: 28
LOS: 5 days | Discharge: HOME HEALTH | DRG: 584 | End: 2018-03-20
Attending: PLASTIC SURGERY | Admitting: PLASTIC SURGERY
Payer: COMMERCIAL

## 2018-03-14 ENCOUNTER — RESULT REVIEW (OUTPATIENT)
Age: 28
End: 2018-03-14

## 2018-03-14 DIAGNOSIS — Z98.890 OTHER SPECIFIED POSTPROCEDURAL STATES: Chronic | ICD-10-CM

## 2018-03-14 DIAGNOSIS — Z95.828 PRESENCE OF OTHER VASCULAR IMPLANTS AND GRAFTS: Chronic | ICD-10-CM

## 2018-03-14 PROCEDURE — 35761: CPT | Mod: 59

## 2018-03-14 PROCEDURE — 19371 PERI-IMPLT CAPSLC BRST COMPL: CPT | Mod: 50,59

## 2018-03-14 PROCEDURE — 15734 MUSCLE-SKIN GRAFT TRUNK: CPT | Mod: 59

## 2018-03-14 PROCEDURE — S2068: CPT | Mod: RT

## 2018-03-14 RX ORDER — KETOROLAC TROMETHAMINE 30 MG/ML
30 SYRINGE (ML) INJECTION EVERY 6 HOURS
Qty: 0 | Refills: 0 | Status: DISCONTINUED | OUTPATIENT
Start: 2018-03-14 | End: 2018-03-17

## 2018-03-14 RX ORDER — ACETAMINOPHEN 500 MG
975 TABLET ORAL EVERY 8 HOURS
Qty: 0 | Refills: 0 | Status: DISCONTINUED | OUTPATIENT
Start: 2018-03-14 | End: 2018-03-20

## 2018-03-14 RX ORDER — DOCUSATE SODIUM 100 MG
100 CAPSULE ORAL THREE TIMES A DAY
Qty: 0 | Refills: 0 | Status: DISCONTINUED | OUTPATIENT
Start: 2018-03-14 | End: 2018-03-20

## 2018-03-14 RX ORDER — ONDANSETRON 8 MG/1
4 TABLET, FILM COATED ORAL EVERY 6 HOURS
Qty: 0 | Refills: 0 | Status: DISCONTINUED | OUTPATIENT
Start: 2018-03-14 | End: 2018-03-20

## 2018-03-14 RX ORDER — SODIUM CHLORIDE 9 MG/ML
1000 INJECTION, SOLUTION INTRAVENOUS
Qty: 0 | Refills: 0 | Status: DISCONTINUED | OUTPATIENT
Start: 2018-03-14 | End: 2018-03-15

## 2018-03-14 RX ORDER — BUPIVACAINE 13.3 MG/ML
20 INJECTION, SUSPENSION, LIPOSOMAL INFILTRATION ONCE
Qty: 0 | Refills: 0 | Status: DISCONTINUED | OUTPATIENT
Start: 2018-03-14 | End: 2018-03-14

## 2018-03-14 RX ORDER — ENOXAPARIN SODIUM 100 MG/ML
40 INJECTION SUBCUTANEOUS ONCE
Qty: 0 | Refills: 0 | Status: COMPLETED | OUTPATIENT
Start: 2018-03-14 | End: 2018-03-14

## 2018-03-14 RX ORDER — DIAZEPAM 5 MG
5 TABLET ORAL EVERY 8 HOURS
Qty: 0 | Refills: 0 | Status: DISCONTINUED | OUTPATIENT
Start: 2018-03-14 | End: 2018-03-15

## 2018-03-14 RX ORDER — CEFAZOLIN SODIUM 1 G
2000 VIAL (EA) INJECTION EVERY 8 HOURS
Qty: 0 | Refills: 0 | Status: COMPLETED | OUTPATIENT
Start: 2018-03-14 | End: 2018-03-15

## 2018-03-14 RX ORDER — SENNA PLUS 8.6 MG/1
2 TABLET ORAL AT BEDTIME
Qty: 0 | Refills: 0 | Status: DISCONTINUED | OUTPATIENT
Start: 2018-03-14 | End: 2018-03-20

## 2018-03-14 RX ORDER — METOCLOPRAMIDE HCL 10 MG
10 TABLET ORAL EVERY 6 HOURS
Qty: 0 | Refills: 0 | Status: DISCONTINUED | OUTPATIENT
Start: 2018-03-14 | End: 2018-03-20

## 2018-03-14 RX ORDER — ESCITALOPRAM OXALATE 10 MG/1
5 TABLET, FILM COATED ORAL DAILY
Qty: 0 | Refills: 0 | Status: DISCONTINUED | OUTPATIENT
Start: 2018-03-14 | End: 2018-03-20

## 2018-03-14 RX ORDER — OXYCODONE HYDROCHLORIDE 5 MG/1
10 TABLET ORAL EVERY 4 HOURS
Qty: 0 | Refills: 0 | Status: DISCONTINUED | OUTPATIENT
Start: 2018-03-14 | End: 2018-03-19

## 2018-03-14 RX ORDER — HYDROMORPHONE HYDROCHLORIDE 2 MG/ML
0.5 INJECTION INTRAMUSCULAR; INTRAVENOUS; SUBCUTANEOUS
Qty: 0 | Refills: 0 | Status: DISCONTINUED | OUTPATIENT
Start: 2018-03-14 | End: 2018-03-14

## 2018-03-14 RX ORDER — HYDROXYZINE HCL 10 MG
10 TABLET ORAL DAILY
Qty: 0 | Refills: 0 | Status: DISCONTINUED | OUTPATIENT
Start: 2018-03-14 | End: 2018-03-20

## 2018-03-14 RX ORDER — ENOXAPARIN SODIUM 100 MG/ML
40 INJECTION SUBCUTANEOUS EVERY 24 HOURS
Qty: 0 | Refills: 0 | Status: DISCONTINUED | OUTPATIENT
Start: 2018-03-15 | End: 2018-03-20

## 2018-03-14 RX ORDER — ACETAMINOPHEN 500 MG
1000 TABLET ORAL ONCE
Qty: 0 | Refills: 0 | Status: COMPLETED | OUTPATIENT
Start: 2018-03-14 | End: 2018-03-14

## 2018-03-14 RX ORDER — OXYCODONE HYDROCHLORIDE 5 MG/1
5 TABLET ORAL EVERY 4 HOURS
Qty: 0 | Refills: 0 | Status: DISCONTINUED | OUTPATIENT
Start: 2018-03-14 | End: 2018-03-19

## 2018-03-14 RX ADMIN — SODIUM CHLORIDE 125 MILLILITER(S): 9 INJECTION, SOLUTION INTRAVENOUS at 22:22

## 2018-03-14 RX ADMIN — Medication 1000 MILLIGRAM(S): at 23:02

## 2018-03-14 RX ADMIN — Medication 30 MILLIGRAM(S): at 22:23

## 2018-03-14 RX ADMIN — Medication 30 MILLIGRAM(S): at 21:43

## 2018-03-14 RX ADMIN — HYDROMORPHONE HYDROCHLORIDE 0.5 MILLIGRAM(S): 2 INJECTION INTRAMUSCULAR; INTRAVENOUS; SUBCUTANEOUS at 19:42

## 2018-03-14 RX ADMIN — ENOXAPARIN SODIUM 40 MILLIGRAM(S): 100 INJECTION SUBCUTANEOUS at 06:49

## 2018-03-14 RX ADMIN — Medication 100 MILLIGRAM(S): at 23:16

## 2018-03-14 RX ADMIN — Medication 400 MILLIGRAM(S): at 22:23

## 2018-03-14 RX ADMIN — HYDROMORPHONE HYDROCHLORIDE 0.5 MILLIGRAM(S): 2 INJECTION INTRAMUSCULAR; INTRAVENOUS; SUBCUTANEOUS at 20:25

## 2018-03-15 LAB
ANION GAP SERPL CALC-SCNC: 13 MMOL/L — SIGNIFICANT CHANGE UP (ref 5–17)
BUN SERPL-MCNC: 10 MG/DL — SIGNIFICANT CHANGE UP (ref 7–23)
CALCIUM SERPL-MCNC: 8.4 MG/DL — SIGNIFICANT CHANGE UP (ref 8.4–10.5)
CHLORIDE SERPL-SCNC: 99 MMOL/L — SIGNIFICANT CHANGE UP (ref 96–108)
CO2 SERPL-SCNC: 24 MMOL/L — SIGNIFICANT CHANGE UP (ref 22–31)
CREAT SERPL-MCNC: 0.48 MG/DL — LOW (ref 0.5–1.3)
GLUCOSE SERPL-MCNC: 117 MG/DL — HIGH (ref 70–99)
HCT VFR BLD CALC: 30.8 % — LOW (ref 34.5–45)
HGB BLD-MCNC: 10.3 G/DL — LOW (ref 11.5–15.5)
MCHC RBC-ENTMCNC: 30 PG — SIGNIFICANT CHANGE UP (ref 27–34)
MCHC RBC-ENTMCNC: 33.4 G/DL — SIGNIFICANT CHANGE UP (ref 32–36)
MCV RBC AUTO: 89.8 FL — SIGNIFICANT CHANGE UP (ref 80–100)
PLATELET # BLD AUTO: 243 K/UL — SIGNIFICANT CHANGE UP (ref 150–400)
POTASSIUM SERPL-MCNC: 3.3 MMOL/L — LOW (ref 3.5–5.3)
POTASSIUM SERPL-SCNC: 3.3 MMOL/L — LOW (ref 3.5–5.3)
RBC # BLD: 3.43 M/UL — LOW (ref 3.8–5.2)
RBC # FLD: 12.9 % — SIGNIFICANT CHANGE UP (ref 10.3–16.9)
SODIUM SERPL-SCNC: 136 MMOL/L — SIGNIFICANT CHANGE UP (ref 135–145)
WBC # BLD: 7.1 K/UL — SIGNIFICANT CHANGE UP (ref 3.8–10.5)
WBC # FLD AUTO: 7.1 K/UL — SIGNIFICANT CHANGE UP (ref 3.8–10.5)

## 2018-03-15 RX ORDER — POTASSIUM CHLORIDE 20 MEQ
40 PACKET (EA) ORAL ONCE
Qty: 0 | Refills: 0 | Status: COMPLETED | OUTPATIENT
Start: 2018-03-15 | End: 2018-03-15

## 2018-03-15 RX ORDER — SODIUM CHLORIDE 9 MG/ML
1000 INJECTION, SOLUTION INTRAVENOUS
Qty: 0 | Refills: 0 | Status: DISCONTINUED | OUTPATIENT
Start: 2018-03-15 | End: 2018-03-20

## 2018-03-15 RX ORDER — HYDROMORPHONE HYDROCHLORIDE 2 MG/ML
1 INJECTION INTRAMUSCULAR; INTRAVENOUS; SUBCUTANEOUS
Qty: 0 | Refills: 0 | Status: DISCONTINUED | OUTPATIENT
Start: 2018-03-15 | End: 2018-03-19

## 2018-03-15 RX ORDER — DIAZEPAM 5 MG
5 TABLET ORAL EVERY 6 HOURS
Qty: 0 | Refills: 0 | Status: DISCONTINUED | OUTPATIENT
Start: 2018-03-15 | End: 2018-03-20

## 2018-03-15 RX ADMIN — ENOXAPARIN SODIUM 40 MILLIGRAM(S): 100 INJECTION SUBCUTANEOUS at 08:42

## 2018-03-15 RX ADMIN — ESCITALOPRAM OXALATE 5 MILLIGRAM(S): 10 TABLET, FILM COATED ORAL at 12:48

## 2018-03-15 RX ADMIN — Medication 5 MILLIGRAM(S): at 16:50

## 2018-03-15 RX ADMIN — HYDROMORPHONE HYDROCHLORIDE 1 MILLIGRAM(S): 2 INJECTION INTRAMUSCULAR; INTRAVENOUS; SUBCUTANEOUS at 09:54

## 2018-03-15 RX ADMIN — HYDROMORPHONE HYDROCHLORIDE 1 MILLIGRAM(S): 2 INJECTION INTRAMUSCULAR; INTRAVENOUS; SUBCUTANEOUS at 10:15

## 2018-03-15 RX ADMIN — Medication 30 MILLIGRAM(S): at 21:37

## 2018-03-15 RX ADMIN — Medication 975 MILLIGRAM(S): at 07:05

## 2018-03-15 RX ADMIN — Medication 5 MILLIGRAM(S): at 03:39

## 2018-03-15 RX ADMIN — Medication 30 MILLIGRAM(S): at 15:47

## 2018-03-15 RX ADMIN — OXYCODONE HYDROCHLORIDE 10 MILLIGRAM(S): 5 TABLET ORAL at 07:05

## 2018-03-15 RX ADMIN — Medication 10 MILLIGRAM(S): at 12:50

## 2018-03-15 RX ADMIN — HYDROMORPHONE HYDROCHLORIDE 1 MILLIGRAM(S): 2 INJECTION INTRAMUSCULAR; INTRAVENOUS; SUBCUTANEOUS at 08:30

## 2018-03-15 RX ADMIN — Medication 30 MILLIGRAM(S): at 09:10

## 2018-03-15 RX ADMIN — Medication 975 MILLIGRAM(S): at 06:35

## 2018-03-15 RX ADMIN — OXYCODONE HYDROCHLORIDE 10 MILLIGRAM(S): 5 TABLET ORAL at 13:00

## 2018-03-15 RX ADMIN — Medication 975 MILLIGRAM(S): at 15:45

## 2018-03-15 RX ADMIN — Medication 30 MILLIGRAM(S): at 03:07

## 2018-03-15 RX ADMIN — Medication 975 MILLIGRAM(S): at 21:37

## 2018-03-15 RX ADMIN — Medication 100 MILLIGRAM(S): at 21:37

## 2018-03-15 RX ADMIN — Medication 975 MILLIGRAM(S): at 14:45

## 2018-03-15 RX ADMIN — Medication 100 MILLIGRAM(S): at 14:45

## 2018-03-15 RX ADMIN — Medication 100 MILLIGRAM(S): at 18:08

## 2018-03-15 RX ADMIN — Medication 975 MILLIGRAM(S): at 22:37

## 2018-03-15 RX ADMIN — Medication 40 MILLIEQUIVALENT(S): at 14:45

## 2018-03-15 RX ADMIN — Medication 30 MILLIGRAM(S): at 09:30

## 2018-03-15 RX ADMIN — OXYCODONE HYDROCHLORIDE 10 MILLIGRAM(S): 5 TABLET ORAL at 06:35

## 2018-03-15 RX ADMIN — HYDROMORPHONE HYDROCHLORIDE 1 MILLIGRAM(S): 2 INJECTION INTRAMUSCULAR; INTRAVENOUS; SUBCUTANEOUS at 01:25

## 2018-03-15 RX ADMIN — Medication 5 MILLIGRAM(S): at 09:10

## 2018-03-15 RX ADMIN — Medication 100 MILLIGRAM(S): at 06:35

## 2018-03-15 RX ADMIN — Medication 30 MILLIGRAM(S): at 22:00

## 2018-03-15 RX ADMIN — HYDROMORPHONE HYDROCHLORIDE 1 MILLIGRAM(S): 2 INJECTION INTRAMUSCULAR; INTRAVENOUS; SUBCUTANEOUS at 17:20

## 2018-03-15 RX ADMIN — HYDROMORPHONE HYDROCHLORIDE 1 MILLIGRAM(S): 2 INJECTION INTRAMUSCULAR; INTRAVENOUS; SUBCUTANEOUS at 22:30

## 2018-03-15 RX ADMIN — Medication 100 MILLIGRAM(S): at 09:10

## 2018-03-15 RX ADMIN — HYDROMORPHONE HYDROCHLORIDE 1 MILLIGRAM(S): 2 INJECTION INTRAMUSCULAR; INTRAVENOUS; SUBCUTANEOUS at 23:00

## 2018-03-15 RX ADMIN — Medication 30 MILLIGRAM(S): at 16:00

## 2018-03-15 RX ADMIN — Medication 5 MILLIGRAM(S): at 22:51

## 2018-03-15 RX ADMIN — HYDROMORPHONE HYDROCHLORIDE 1 MILLIGRAM(S): 2 INJECTION INTRAMUSCULAR; INTRAVENOUS; SUBCUTANEOUS at 07:51

## 2018-03-15 RX ADMIN — Medication 30 MILLIGRAM(S): at 03:50

## 2018-03-15 RX ADMIN — OXYCODONE HYDROCHLORIDE 10 MILLIGRAM(S): 5 TABLET ORAL at 00:03

## 2018-03-15 RX ADMIN — HYDROMORPHONE HYDROCHLORIDE 1 MILLIGRAM(S): 2 INJECTION INTRAMUSCULAR; INTRAVENOUS; SUBCUTANEOUS at 02:00

## 2018-03-15 RX ADMIN — OXYCODONE HYDROCHLORIDE 10 MILLIGRAM(S): 5 TABLET ORAL at 12:01

## 2018-03-15 RX ADMIN — HYDROMORPHONE HYDROCHLORIDE 1 MILLIGRAM(S): 2 INJECTION INTRAMUSCULAR; INTRAVENOUS; SUBCUTANEOUS at 16:50

## 2018-03-15 NOTE — PHYSICAL THERAPY INITIAL EVALUATION ADULT - CRITERIA FOR SKILLED THERAPEUTIC INTERVENTIONS
impairments found/risk reduction/prevention/rehab potential/therapy frequency/anticipated discharge recommendation/functional limitations in following categories

## 2018-03-15 NOTE — OCCUPATIONAL THERAPY INITIAL EVALUATION ADULT - GENERAL OBSERVATIONS, REHAB EVAL
Right hand dominant. Chart reviewed, patient cleared for OT eval by REINA Messina, received semi-supine, NAD, +SCDs, +doppler, +JPx4, +heplock.

## 2018-03-15 NOTE — PROGRESS NOTE ADULT - ASSESSMENT
Plan  - remove holguin  - d/c tele  - CLD  - IVF to D51/2  - OOB  - q1 flap cehcks  - pain control  - DVT ppx

## 2018-03-15 NOTE — PROGRESS NOTE ADULT - SUBJECTIVE AND OBJECTIVE BOX
KETTY LAZAR   [id number]  27y    POD#1 s/p b/l delayed autologous breast recon with IKER flaps and removal of bilateral breast implants. VSS, AOx3, C.O PO pain in breasts and abdomen as well as tenderness of her upper arms / elbow Left > Right from prolonged immobilization and positioning on the OR table. Breast mounds soft, skin islands pink - normal Cap refill, no collections, left breast with normal dopplerable transcutaneous pulse - right breast has no dopplerable pulse on the small skin island (Nipple sparing mastectomy) but has normal Vioptix skin SzO2 signal. abomen soft - dressing C/D/I          T(C): 36.6 (03-15-18 @ 14:00), Max: 38.3 (03-14-18 @ 19:08)  HR: 79 (03-15-18 @ 16:34) (74 - 100)  BP: 134/60 (03-15-18 @ 17:10) (104/52 - 144/73)  RR: 17 (03-15-18 @ 16:34) (5 - 19)  SpO2: 99% (03-15-18 @ 17:10) (92% - 100%)  Wt(kg): --      03-14 @ 07:01  -  03-15 @ 07:00  --------------------------------------------------------  IN: 1500 mL / OUT: 1030 mL / NET: 470 mL    03-15 @ 07:01  -  03-15 @ 18:40  --------------------------------------------------------  IN: 1190 mL / OUT: 840 mL / NET: 350 mL        acetaminophen   Tablet. 975 milliGRAM(s) Oral every 8 hours  dextrose 5% + sodium chloride 0.45%. 1000 milliLiter(s) IV Continuous <Continuous>  diazepam    Tablet 5 milliGRAM(s) Oral every 6 hours PRN  docusate sodium 100 milliGRAM(s) Oral three times a day  enoxaparin Injectable 40 milliGRAM(s) SubCutaneous every 24 hours  escitalopram 5 milliGRAM(s) Oral daily  HYDROmorphone  Injectable 1 milliGRAM(s) IV Push every 2 hours PRN  hydrOXYzine hydrochloride 10 milliGRAM(s) Oral daily  influenza   Vaccine 0.5 milliLiter(s) IntraMuscular once  ketorolac   Injectable 30 milliGRAM(s) IV Push every 6 hours  metoclopramide Injectable 10 milliGRAM(s) IV Push every 6 hours PRN  ondansetron Injectable 4 milliGRAM(s) IV Push every 6 hours PRN  oxyCODONE    IR 5 milliGRAM(s) Oral every 4 hours PRN  oxyCODONE    IR 10 milliGRAM(s) Oral every 4 hours PRN  senna 2 Tablet(s) Oral at bedtime PRN                            10.3   7.1   )-----------( 243      ( 15 Mar 2018 12:19 )             30.8     03-15    136  |  99  |  10  ----------------------------<  117<H>  3.3<L>   |  24  |  0.48<L>    Ca    8.4      15 Mar 2018 12:19

## 2018-03-15 NOTE — OCCUPATIONAL THERAPY INITIAL EVALUATION ADULT - PERTINENT HX OF CURRENT PROBLEM, REHAB EVAL
Patient underwent bilateral breast reconstruction w/ IKER flaps, removal of tissue expanders, reposition of pec muscle and mesh reinforcement on 3/14/18.

## 2018-03-15 NOTE — PHYSICAL THERAPY INITIAL EVALUATION ADULT - ADDITIONAL COMMENTS
Pt lives w/ 3 children in private home that has 3 steps to enter. Denies prior use of DME for ambulation, no HHA, no hx of recent falls. States that her friend will be staying w/ her to assist when she returns home

## 2018-03-15 NOTE — OCCUPATIONAL THERAPY INITIAL EVALUATION ADULT - MANUAL MUSCLE TESTING RESULTS, REHAB EVAL
Right upper extremity not strength deficits identified, no  MMT of shoulder 2/2 surgical precautions. Left shoulder/elbow 3-/5. Left wrist/hand 4/5.

## 2018-03-15 NOTE — PHYSICAL THERAPY INITIAL EVALUATION ADULT - DID THE PATIENT HAVE SURGERY?
B/L breast reconstruction w/ IKER flaps, removal of tissue expanders, reposition of pec muscle and mesh reinforcement/yes

## 2018-03-15 NOTE — PHYSICAL THERAPY INITIAL EVALUATION ADULT - GENERAL OBSERVATIONS, REHAB EVAL
Pt received semi-supine in bed +IV, +dopplers, +4JP drains, +abdominal incision and B/L breast incision C/D/I, family present, +tele, c/o pain

## 2018-03-15 NOTE — PROGRESS NOTE ADULT - SUBJECTIVE AND OBJECTIVE BOX
Pt seen and examined. Doing well. No acute events o/n.     T(C): 36.4 (03-15-18 @ 05:29), Max: 38.3 (03-14-18 @ 19:08)  T(F): 97.5 (03-15-18 @ 05:29), Max: 101 (03-14-18 @ 19:08)  HR: 85 (03-15-18 @ 08:20) (74 - 100)  BP: 116/55 (03-15-18 @ 08:20) (104/52 - 132/56)  RR: 19 (03-15-18 @ 08:20) (5 - 19)  SpO2: 95% (03-15-18 @ 08:20) (95% - 100%)      14 Mar 2018 07:01  -  15 Mar 2018 07:00  --------------------------------------------------------  IN:    lactated ringers.: 1500 mL  Total IN: 1500 mL    OUT:    Bulb: 35 mL    Bulb: 100 mL    Bulb: 115 mL    Bulb: 90 mL    Indwelling Catheter - Urethral: 690 mL  Total OUT: 1030 mL    Total NET: 470 mL          Exam:  L flap soft, pink, viable. 3s cap refill. No palpable collections. L skin doppler strong signal.  Incisions c/d/i.  R flap soft, pink, viable. 3s cap refill. No palpable collections. Vioptix stable. Incisions c/d/i.  Abdomen soft, appropriately tender. No palpable collections.   Umbilicus dressing c/d/i. Incisions c/d/i.  JPs serosanguinous.               Plan:

## 2018-03-15 NOTE — PROGRESS NOTE ADULT - ASSESSMENT
POD #1 doing well, Continue flap checks, continue Viotix monitoring, conitnue Lovenox DVT prophylaxis, OOB ambulate with PT, Pain control with Toradol, Tylenol, Dilaudid, Valium  clears --> advance diet as tolerated.

## 2018-03-15 NOTE — OCCUPATIONAL THERAPY INITIAL EVALUATION ADULT - RANGE OF MOTION EXAMINATION, UPPER EXTREMITY
Right UE Active ROM was WFL (within functional limits)/Left shoulder not tested 2/2 pain. Right elbow AAROM limited 0-90 degrees of flexion 2/2 pain (extension>flexion). Left wrist/hand AROM WFL. +edema proximal of medical epicondyle

## 2018-03-15 NOTE — OCCUPATIONAL THERAPY INITIAL EVALUATION ADULT - PLANNED THERAPY INTERVENTIONS, OT EVAL
ROM/balance training/ADL retraining/transfer training/IADL retraining/bed mobility training/strengthening

## 2018-03-16 ENCOUNTER — TRANSCRIPTION ENCOUNTER (OUTPATIENT)
Age: 28
End: 2018-03-16

## 2018-03-16 RX ORDER — DOCUSATE SODIUM 100 MG
1 CAPSULE ORAL
Qty: 0 | Refills: 0 | COMMUNITY
Start: 2018-03-16

## 2018-03-16 RX ORDER — SENNA PLUS 8.6 MG/1
2 TABLET ORAL
Qty: 0 | Refills: 0 | COMMUNITY
Start: 2018-03-16

## 2018-03-16 RX ADMIN — HYDROMORPHONE HYDROCHLORIDE 1 MILLIGRAM(S): 2 INJECTION INTRAMUSCULAR; INTRAVENOUS; SUBCUTANEOUS at 12:23

## 2018-03-16 RX ADMIN — Medication 30 MILLIGRAM(S): at 14:56

## 2018-03-16 RX ADMIN — OXYCODONE HYDROCHLORIDE 10 MILLIGRAM(S): 5 TABLET ORAL at 11:10

## 2018-03-16 RX ADMIN — OXYCODONE HYDROCHLORIDE 10 MILLIGRAM(S): 5 TABLET ORAL at 23:58

## 2018-03-16 RX ADMIN — Medication 100 MILLIGRAM(S): at 21:46

## 2018-03-16 RX ADMIN — OXYCODONE HYDROCHLORIDE 10 MILLIGRAM(S): 5 TABLET ORAL at 10:09

## 2018-03-16 RX ADMIN — Medication 975 MILLIGRAM(S): at 14:56

## 2018-03-16 RX ADMIN — Medication 975 MILLIGRAM(S): at 21:46

## 2018-03-16 RX ADMIN — HYDROMORPHONE HYDROCHLORIDE 1 MILLIGRAM(S): 2 INJECTION INTRAMUSCULAR; INTRAVENOUS; SUBCUTANEOUS at 15:25

## 2018-03-16 RX ADMIN — Medication 30 MILLIGRAM(S): at 03:10

## 2018-03-16 RX ADMIN — Medication 975 MILLIGRAM(S): at 06:02

## 2018-03-16 RX ADMIN — ESCITALOPRAM OXALATE 5 MILLIGRAM(S): 10 TABLET, FILM COATED ORAL at 12:18

## 2018-03-16 RX ADMIN — OXYCODONE HYDROCHLORIDE 10 MILLIGRAM(S): 5 TABLET ORAL at 00:46

## 2018-03-16 RX ADMIN — Medication 30 MILLIGRAM(S): at 09:30

## 2018-03-16 RX ADMIN — Medication 30 MILLIGRAM(S): at 09:04

## 2018-03-16 RX ADMIN — HYDROMORPHONE HYDROCHLORIDE 1 MILLIGRAM(S): 2 INJECTION INTRAMUSCULAR; INTRAVENOUS; SUBCUTANEOUS at 19:32

## 2018-03-16 RX ADMIN — Medication 30 MILLIGRAM(S): at 21:46

## 2018-03-16 RX ADMIN — HYDROMORPHONE HYDROCHLORIDE 1 MILLIGRAM(S): 2 INJECTION INTRAMUSCULAR; INTRAVENOUS; SUBCUTANEOUS at 16:00

## 2018-03-16 RX ADMIN — OXYCODONE HYDROCHLORIDE 10 MILLIGRAM(S): 5 TABLET ORAL at 22:58

## 2018-03-16 RX ADMIN — Medication 5 MILLIGRAM(S): at 06:53

## 2018-03-16 RX ADMIN — OXYCODONE HYDROCHLORIDE 10 MILLIGRAM(S): 5 TABLET ORAL at 18:23

## 2018-03-16 RX ADMIN — Medication 10 MILLIGRAM(S): at 12:19

## 2018-03-16 RX ADMIN — OXYCODONE HYDROCHLORIDE 10 MILLIGRAM(S): 5 TABLET ORAL at 18:53

## 2018-03-16 RX ADMIN — OXYCODONE HYDROCHLORIDE 10 MILLIGRAM(S): 5 TABLET ORAL at 04:56

## 2018-03-16 RX ADMIN — Medication 5 MILLIGRAM(S): at 13:39

## 2018-03-16 RX ADMIN — Medication 30 MILLIGRAM(S): at 03:40

## 2018-03-16 RX ADMIN — Medication 975 MILLIGRAM(S): at 22:46

## 2018-03-16 RX ADMIN — HYDROMORPHONE HYDROCHLORIDE 1 MILLIGRAM(S): 2 INJECTION INTRAMUSCULAR; INTRAVENOUS; SUBCUTANEOUS at 13:54

## 2018-03-16 RX ADMIN — OXYCODONE HYDROCHLORIDE 10 MILLIGRAM(S): 5 TABLET ORAL at 01:46

## 2018-03-16 RX ADMIN — Medication 30 MILLIGRAM(S): at 15:54

## 2018-03-16 RX ADMIN — Medication 30 MILLIGRAM(S): at 21:56

## 2018-03-16 RX ADMIN — Medication 975 MILLIGRAM(S): at 15:54

## 2018-03-16 RX ADMIN — Medication 100 MILLIGRAM(S): at 14:56

## 2018-03-16 RX ADMIN — OXYCODONE HYDROCHLORIDE 10 MILLIGRAM(S): 5 TABLET ORAL at 05:56

## 2018-03-16 RX ADMIN — ENOXAPARIN SODIUM 40 MILLIGRAM(S): 100 INJECTION SUBCUTANEOUS at 07:55

## 2018-03-16 RX ADMIN — Medication 975 MILLIGRAM(S): at 07:02

## 2018-03-16 RX ADMIN — Medication 100 MILLIGRAM(S): at 06:03

## 2018-03-16 NOTE — DISCHARGE NOTE ADULT - ADDITIONAL INSTRUCTIONS
-Call Doctor’s office or return to ER if: fever (temperature >101.4F), chills, chest pain, shortness of breath, uncontrolled/severe pain, persistent nausea/vomiting, or bleeding/oozing/redness/swelling at incision sites.  	  -For routine questions, call the office ([phone]) weekdays 9:00 A.M. - 5:00 P.M.  For emergencies after business hours, call any time using this same office phone number and the answering service will put you in touch with Dr. Lerman.

## 2018-03-16 NOTE — DISCHARGE NOTE ADULT - CARE PROVIDER_API CALL
Lerman, Oren Z (MD), Plastic Surgery  [address]  [address]  [place], [place] [address]  Phone: [phone]  Fax: [phone]

## 2018-03-16 NOTE — DISCHARGE NOTE ADULT - MEDICATION SUMMARY - MEDICATIONS TO TAKE
I will START or STAY ON the medications listed below when I get home from the hospital:    aspirin 325 mg oral tablet  -- 1 tab(s) by mouth once a day for 10 days  -- Indication: For Flap prophylaxis    Percocet 5/325 oral tablet  -- 1 tab(s) by mouth every 4 hours, As Needed MDD:8  -- Caution federal law prohibits the transfer of this drug to any person other  than the person for whom it was prescribed.  May cause drowsiness.  Alcohol may intensify this effect.  Use care when operating dangerous machinery.  This prescription cannot be refilled.  This product contains acetaminophen.  Do not use  with any other product containing acetaminophen to prevent possible liver damage.  Using more of this medication than prescribed may cause serious breathing problems.    -- Indication: For Pain    escitalopram 5 mg oral tablet  -- 1 tablet by mouth once daily  -- Indication: For Home med    hydrOXYzine hydrochloride 10 mg oral tablet  -- 1- 2 tablets by mouth once daily  -- Indication: For Home med    docusate sodium 100 mg oral capsule  -- 1 cap(s) by mouth 3 times a day  -- Indication: For Stool softner    senna oral tablet  -- 2 tab(s) by mouth once a day (at bedtime), As needed, Constipation  -- Indication: For Stool softner I will START or STAY ON the medications listed below when I get home from the hospital:    aspirin 325 mg oral tablet  -- 1 tab(s) by mouth once a day for 10 days  -- Indication: For Flap prophylaxis    Dilaudid 4 mg oral tablet  -- 0.5-1 tab(s) by mouth every 4 hours, As Needed MDD:6  -- Caution federal law prohibits the transfer of this drug to any person other  than the person for whom it was prescribed.  May cause drowsiness.  Alcohol may intensify this effect.  Use care when operating dangerous machinery.  This prescription cannot be refilled.  Using more of this medication than prescribed may cause serious breathing problems.    -- Indication: For severe pain    ibuprofen 600 mg oral tablet  -- 1 tab(s) by mouth every 6 hours   -- Do not take this drug if you are pregnant.  It is very important that you take or use this exactly as directed.  Do not skip doses or discontinue unless directed by your doctor.  May cause drowsiness or dizziness.  Obtain medical advice before taking any non-prescription drugs as some may affect the action of this medication.  Take with food or milk.    -- Indication: For inflammation    Valium 5 mg oral tablet  -- 1 tab(s) by mouth every 6 hours, As Needed -for nausea - for muscle spasm MDD:4  -- Caution federal law prohibits the transfer of this drug to any person other  than the person for whom it was prescribed.  Do not take this drug if you are pregnant.  May cause drowsiness.  Alcohol may intensify this effect.  Use care when operating dangerous machinery.    -- Indication: For muscle spasms    escitalopram 5 mg oral tablet  -- 1 tablet by mouth once daily  -- Indication: For Home med    Zofran ODT 4 mg oral tablet, disintegrating  -- 1 tab(s) by mouth 3 times a day  -- Indication: For nausea    hydrOXYzine hydrochloride 10 mg oral tablet  -- 1- 2 tablets by mouth once daily  -- Indication: For Home med    docusate sodium 100 mg oral capsule  -- 1 cap(s) by mouth 3 times a day  -- Indication: For Stool softner    senna oral tablet  -- 2 tab(s) by mouth once a day (at bedtime), As needed, Constipation  -- Indication: For Stool softner

## 2018-03-16 NOTE — DISCHARGE NOTE ADULT - HOSPITAL COURSE
26 yo F underwent elective breast reconstruction with IKER flaps. Patient tolerated the procedure well and had uneventful postoperative hospital course.  On the day of the discharge, patient was evaluated and deemed in stable condition to be discharged to home. Follow up in one week with Dr. Lerman.

## 2018-03-16 NOTE — DISCHARGE NOTE ADULT - PLAN OF CARE
Recovery *Please refer to the post-operative care instruction provided from Dr. Lerman’s office.  -Follow up with Plastic & Reconstructive Surgeon, Dr. Lerman in 1 week in the office.   -Follow up with Breast Surgeon, in 1-2 weeks in the office.  -Continue CALIXTO drain care as instructed. (Empty and record the CALIXTO drainage twice daily after discharge. Also, strip/milk the drain tubing each time to minimize clogging. Bring the recorded drain amounts to the office so that it can be reviewed by the physician.)  -Take Aspirin 325mg once daily for 10days.   -Take Percocet prescribed for pain control.  -Apply Bacitracin ointment to the belly button and Aquaphor moisturizer to the incision line on the abdomen twice daily after discharge.  -Wear abdominal binder when out of bed, walking around.  -Diet: no restrictions.   -Showers are permitted the day of discharge. The drains and the incision lines can get wet in the shower. Do not take a bath. Pin the drains to a bathrobe belt or string or a small towel draped over your neck in order that the drains do not dangle from your skin while in the shower. Keep incision sites and CALIXTO drain sites clean & dry after showering.  -No heavy lifting >20 pounds or strenuous exercises. *Please refer to the post-operative care instruction provided from Dr. Lerman’s office.  -Follow up with Plastic & Reconstructive Surgeon, Dr. Lerman in 1 week in the office.   -Follow up with Breast Surgeon, in 1-2 weeks in the office.  -Continue CALIXTO drain care as instructed. (Empty and record the CALIXTO drainage twice daily after discharge. Also, strip/milk the drain tubing each time to minimize clogging. Bring the recorded drain amounts to the office so that it can be reviewed by the physician.)  -Take Aspirin 325mg once daily for 10days.   -Take Percocet prescribed for pain control.  -Apply Bacitracin ointment to the belly button and Aquaphor moisturizer to the incision line on the abdomen twice daily after discharge.  -Wear abdominal binder when out of bed, walking around.  -Diet: no restrictions.   -Showers are permitted the day of discharge. The drains and the incision lines can get wet in the shower. Do not take a bath. Pin the drains to a bathrobe belt or string or a small towel draped over your neck in order that the drains do not dangle from your skin while in the shower. Keep incision sites and CALIXTO drain sites clean & dry after showering.  -No Picc line care needed. Patient aware she is to follow up in office or with IR as outpatient for line to be removed.  -No heavy lifting >20 pounds or strenuous exercises.

## 2018-03-16 NOTE — DISCHARGE NOTE ADULT - PATIENT PORTAL LINK FT
You can access the [facility] Patient Portal, offered by [facility], by registering with the following website: [email or web address]

## 2018-03-16 NOTE — DISCHARGE NOTE ADULT - CARE PLAN
Principal Discharge DX:	Breast cancer  Goal:	Recovery  Assessment and plan of treatment:	*Please refer to the post-operative care instruction provided from Dr. Lerman’s office.  -Follow up with Plastic & Reconstructive Surgeon, Dr. Lerman in 1 week in the office.   -Follow up with Breast Surgeon, in 1-2 weeks in the office.  -Continue CALIXTO drain care as instructed. (Empty and record the CALIXTO drainage twice daily after discharge. Also, strip/milk the drain tubing each time to minimize clogging. Bring the recorded drain amounts to the office so that it can be reviewed by the physician.)  -Take Aspirin 325mg once daily for 10days.   -Take Percocet prescribed for pain control.  -Apply Bacitracin ointment to the belly button and Aquaphor moisturizer to the incision line on the abdomen twice daily after discharge.  -Wear abdominal binder when out of bed, walking around.  -Diet: no restrictions.   -Showers are permitted the day of discharge. The drains and the incision lines can get wet in the shower. Do not take a bath. Pin the drains to a bathrobe belt or string or a small towel draped over your neck in order that the drains do not dangle from your skin while in the shower. Keep incision sites and CALIXTO drain sites clean & dry after showering.  -No heavy lifting >20 pounds or strenuous exercises. Principal Discharge DX:	Breast cancer  Goal:	Recovery  Assessment and plan of treatment:	*Please refer to the post-operative care instruction provided from Dr. Lerman’s office.  -Follow up with Plastic & Reconstructive Surgeon, Dr. Lerman in 1 week in the office.   -Follow up with Breast Surgeon, in 1-2 weeks in the office.  -Continue CALIXTO drain care as instructed. (Empty and record the CALIXTO drainage twice daily after discharge. Also, strip/milk the drain tubing each time to minimize clogging. Bring the recorded drain amounts to the office so that it can be reviewed by the physician.)  -Take Aspirin 325mg once daily for 10days.   -Take Percocet prescribed for pain control.  -Apply Bacitracin ointment to the belly button and Aquaphor moisturizer to the incision line on the abdomen twice daily after discharge.  -Wear abdominal binder when out of bed, walking around.  -Diet: no restrictions.   -Showers are permitted the day of discharge. The drains and the incision lines can get wet in the shower. Do not take a bath. Pin the drains to a bathrobe belt or string or a small towel draped over your neck in order that the drains do not dangle from your skin while in the shower. Keep incision sites and CALIXTO drain sites clean & dry after showering.  -No Picc line care needed. Patient aware she is to follow up in office or with IR as outpatient for line to be removed.  -No heavy lifting >20 pounds or strenuous exercises.

## 2018-03-16 NOTE — PROGRESS NOTE ADULT - SUBJECTIVE AND OBJECTIVE BOX
Pt seen and examined. Doing well. No acute events o/n.     T(C): 37.3 (03-16-18 @ 05:18), Max: 37.7 (03-15-18 @ 18:38)  T(F): 99.1 (03-16-18 @ 05:18), Max: 99.8 (03-15-18 @ 18:38)  HR: 87 (03-16-18 @ 04:45) (79 - 87)  BP: 120/54 (03-16-18 @ 04:45) (115/53 - 144/73)  RR: 17 (03-16-18 @ 04:45) (16 - 17)  SpO2: 97% (03-16-18 @ 04:45) (92% - 100%)      15 Mar 2018 07:01  -  16 Mar 2018 07:00  --------------------------------------------------------  IN:    dextrose 5% + sodium chloride 0.45%.: 2000 mL    IV PiggyBack: 50 mL    Oral Fluid: 240 mL  Total IN: 2290 mL    OUT:    Bulb: 70 mL    Bulb: 85 mL    Bulb: 190 mL    Bulb: 70 mL    Indwelling Catheter - Urethral: 300 mL    Voided: 1500 mL  Total OUT: 2215 mL    Total NET: 75 mL          Exam:  L flap soft, pink, viable. 3s cap refill. No palpable collections. Strong skin doppler signal. Incisions c/d/i.  R flap soft, pink, viable. 3s cap refill. No palpable collections. Vioptix stable. Incisions c/d/i.  Abdomen soft, appropriately tender. No palpable collections.   Umbilicus viable. Incisions c/d/i.  JPs serosanguinous.                           10.3   7.1   )-----------( 243      ( 15 Mar 2018 12:19 )             30.8     03-15    136  |  99  |  10  ----------------------------<  117<H>  3.3<L>   |  24  |  0.48<L>    Ca    8.4      15 Mar 2018 12:19        Plan:

## 2018-03-17 PROCEDURE — 93971 EXTREMITY STUDY: CPT | Mod: 26,LT

## 2018-03-17 RX ADMIN — OXYCODONE HYDROCHLORIDE 10 MILLIGRAM(S): 5 TABLET ORAL at 21:11

## 2018-03-17 RX ADMIN — HYDROMORPHONE HYDROCHLORIDE 1 MILLIGRAM(S): 2 INJECTION INTRAMUSCULAR; INTRAVENOUS; SUBCUTANEOUS at 00:21

## 2018-03-17 RX ADMIN — OXYCODONE HYDROCHLORIDE 10 MILLIGRAM(S): 5 TABLET ORAL at 22:20

## 2018-03-17 RX ADMIN — Medication 975 MILLIGRAM(S): at 22:20

## 2018-03-17 RX ADMIN — Medication 30 MILLIGRAM(S): at 18:58

## 2018-03-17 RX ADMIN — Medication 975 MILLIGRAM(S): at 13:52

## 2018-03-17 RX ADMIN — Medication 30 MILLIGRAM(S): at 03:14

## 2018-03-17 RX ADMIN — OXYCODONE HYDROCHLORIDE 10 MILLIGRAM(S): 5 TABLET ORAL at 14:35

## 2018-03-17 RX ADMIN — HYDROMORPHONE HYDROCHLORIDE 1 MILLIGRAM(S): 2 INJECTION INTRAMUSCULAR; INTRAVENOUS; SUBCUTANEOUS at 09:31

## 2018-03-17 RX ADMIN — ESCITALOPRAM OXALATE 5 MILLIGRAM(S): 10 TABLET, FILM COATED ORAL at 13:53

## 2018-03-17 RX ADMIN — Medication 100 MILLIGRAM(S): at 21:11

## 2018-03-17 RX ADMIN — Medication 5 MILLIGRAM(S): at 05:36

## 2018-03-17 RX ADMIN — HYDROMORPHONE HYDROCHLORIDE 1 MILLIGRAM(S): 2 INJECTION INTRAMUSCULAR; INTRAVENOUS; SUBCUTANEOUS at 00:31

## 2018-03-17 RX ADMIN — Medication 30 MILLIGRAM(S): at 03:24

## 2018-03-17 RX ADMIN — Medication 975 MILLIGRAM(S): at 06:32

## 2018-03-17 RX ADMIN — Medication 30 MILLIGRAM(S): at 11:55

## 2018-03-17 RX ADMIN — Medication 975 MILLIGRAM(S): at 21:11

## 2018-03-17 RX ADMIN — HYDROMORPHONE HYDROCHLORIDE 1 MILLIGRAM(S): 2 INJECTION INTRAMUSCULAR; INTRAVENOUS; SUBCUTANEOUS at 16:48

## 2018-03-17 RX ADMIN — HYDROMORPHONE HYDROCHLORIDE 1 MILLIGRAM(S): 2 INJECTION INTRAMUSCULAR; INTRAVENOUS; SUBCUTANEOUS at 16:30

## 2018-03-17 RX ADMIN — Medication 975 MILLIGRAM(S): at 05:32

## 2018-03-17 RX ADMIN — Medication 30 MILLIGRAM(S): at 11:35

## 2018-03-17 RX ADMIN — OXYCODONE HYDROCHLORIDE 10 MILLIGRAM(S): 5 TABLET ORAL at 13:51

## 2018-03-17 RX ADMIN — Medication 100 MILLIGRAM(S): at 13:55

## 2018-03-17 RX ADMIN — Medication 100 MILLIGRAM(S): at 05:32

## 2018-03-17 RX ADMIN — Medication 10 MILLIGRAM(S): at 13:55

## 2018-03-17 RX ADMIN — HYDROMORPHONE HYDROCHLORIDE 1 MILLIGRAM(S): 2 INJECTION INTRAMUSCULAR; INTRAVENOUS; SUBCUTANEOUS at 23:13

## 2018-03-17 RX ADMIN — Medication 5 MILLIGRAM(S): at 23:14

## 2018-03-17 RX ADMIN — OXYCODONE HYDROCHLORIDE 10 MILLIGRAM(S): 5 TABLET ORAL at 08:16

## 2018-03-17 RX ADMIN — ENOXAPARIN SODIUM 40 MILLIGRAM(S): 100 INJECTION SUBCUTANEOUS at 07:17

## 2018-03-17 RX ADMIN — HYDROMORPHONE HYDROCHLORIDE 1 MILLIGRAM(S): 2 INJECTION INTRAMUSCULAR; INTRAVENOUS; SUBCUTANEOUS at 09:45

## 2018-03-17 RX ADMIN — Medication 975 MILLIGRAM(S): at 14:35

## 2018-03-17 RX ADMIN — OXYCODONE HYDROCHLORIDE 10 MILLIGRAM(S): 5 TABLET ORAL at 07:16

## 2018-03-17 RX ADMIN — Medication 30 MILLIGRAM(S): at 19:15

## 2018-03-17 RX ADMIN — HYDROMORPHONE HYDROCHLORIDE 1 MILLIGRAM(S): 2 INJECTION INTRAMUSCULAR; INTRAVENOUS; SUBCUTANEOUS at 22:29

## 2018-03-17 NOTE — PROGRESS NOTE ADULT - SUBJECTIVE AND OBJECTIVE BOX
SUBJECTIVE:    Pt seen and examined at bedside this AM, she reports that overall she is feeling better, less nausea/dizziness and she continues to deny any postoperative CP/SOB/Dyspnea.  still complains of weakness and fears being able to go back to normal function at home (states she doesn't have support at home).  still complains of pain in her left arm, but with improvement.    OBJECTIVE:     ** VITAL SIGNS / I&O's **    T(C): 36.3 (03-17-18 @ 08:35), Max: 37.5 (03-16-18 @ 14:32)  T(F): 97.3 (03-17-18 @ 08:35), Max: 99.5 (03-16-18 @ 14:32)  HR: 101 (03-17-18 @ 08:35) (82 - 101)  BP: 119/76 (03-17-18 @ 08:35) (110/65 - 144/72)  RR: 16 (03-17-18 @ 08:35) (16 - 18)  SpO2: 100% (03-17-18 @ 08:35) (94% - 100%)      16 Mar 2018 07:01  -  17 Mar 2018 07:00  --------------------------------------------------------  IN:    dextrose 5% + sodium chloride 0.45%.: 900 mL  Total IN: 900 mL    OUT:    Bulb: 75 mL    Bulb: 182.5 mL    Bulb: 47.5 mL    Bulb: 105 mL    Voided: 1200 mL  Total OUT: 1610 mL    Total NET: -710 mL      17 Mar 2018 07:01  -  17 Mar 2018 11:14  --------------------------------------------------------  IN:    Oral Fluid: 480 mL  Total IN: 480 mL    OUT:    Voided: 450 mL  Total OUT: 450 mL    Total NET: 30 mL          ** PHYSICAL EXAM **    -- CONSTITUTIONAL: AOx3. NAD.   -- RIGHT BREAST / FLAP: Mastectomy skin flap ecchymosis, stable. No collections. Flap soft and pink with 2-3 second capillary refill. (+) arterial Doppler signal. Drain serosanguinous.  -- LEFT BREAST / FLAP: Mastectomy skin flap ecchymosis, stable. No collections. Flap soft and pink with 2-3 second capillary refill. (+) arterial Doppler signal. Drain serosanguinous.  -- CARDIOVASCULAR: Regular rate and rhythm. S1, S2.  -- RESPIRATORY: Bilateral breath sounds.   -- ABDOMEN: Soft. No collections. Incision intact. Umbilicus viable. Drains serosanguinous.    ** LABS**                 CAPILLARY BLOOD GLUCOSE

## 2018-03-18 RX ADMIN — HYDROMORPHONE HYDROCHLORIDE 1 MILLIGRAM(S): 2 INJECTION INTRAMUSCULAR; INTRAVENOUS; SUBCUTANEOUS at 13:20

## 2018-03-18 RX ADMIN — Medication 975 MILLIGRAM(S): at 05:19

## 2018-03-18 RX ADMIN — OXYCODONE HYDROCHLORIDE 10 MILLIGRAM(S): 5 TABLET ORAL at 06:22

## 2018-03-18 RX ADMIN — Medication 5 MILLIGRAM(S): at 14:45

## 2018-03-18 RX ADMIN — OXYCODONE HYDROCHLORIDE 10 MILLIGRAM(S): 5 TABLET ORAL at 22:11

## 2018-03-18 RX ADMIN — Medication 100 MILLIGRAM(S): at 14:45

## 2018-03-18 RX ADMIN — OXYCODONE HYDROCHLORIDE 10 MILLIGRAM(S): 5 TABLET ORAL at 17:02

## 2018-03-18 RX ADMIN — SENNA PLUS 2 TABLET(S): 8.6 TABLET ORAL at 21:18

## 2018-03-18 RX ADMIN — HYDROMORPHONE HYDROCHLORIDE 1 MILLIGRAM(S): 2 INJECTION INTRAMUSCULAR; INTRAVENOUS; SUBCUTANEOUS at 08:30

## 2018-03-18 RX ADMIN — OXYCODONE HYDROCHLORIDE 10 MILLIGRAM(S): 5 TABLET ORAL at 02:00

## 2018-03-18 RX ADMIN — OXYCODONE HYDROCHLORIDE 10 MILLIGRAM(S): 5 TABLET ORAL at 01:02

## 2018-03-18 RX ADMIN — OXYCODONE HYDROCHLORIDE 10 MILLIGRAM(S): 5 TABLET ORAL at 05:52

## 2018-03-18 RX ADMIN — Medication 100 MILLIGRAM(S): at 21:18

## 2018-03-18 RX ADMIN — Medication 975 MILLIGRAM(S): at 06:22

## 2018-03-18 RX ADMIN — ENOXAPARIN SODIUM 40 MILLIGRAM(S): 100 INJECTION SUBCUTANEOUS at 07:35

## 2018-03-18 RX ADMIN — Medication 100 MILLIGRAM(S): at 05:19

## 2018-03-18 RX ADMIN — OXYCODONE HYDROCHLORIDE 10 MILLIGRAM(S): 5 TABLET ORAL at 17:55

## 2018-03-18 RX ADMIN — HYDROMORPHONE HYDROCHLORIDE 1 MILLIGRAM(S): 2 INJECTION INTRAMUSCULAR; INTRAVENOUS; SUBCUTANEOUS at 12:57

## 2018-03-18 RX ADMIN — Medication 10 MILLIGRAM(S): at 11:17

## 2018-03-18 RX ADMIN — Medication 5 MILLIGRAM(S): at 05:18

## 2018-03-18 RX ADMIN — HYDROMORPHONE HYDROCHLORIDE 1 MILLIGRAM(S): 2 INJECTION INTRAMUSCULAR; INTRAVENOUS; SUBCUTANEOUS at 19:08

## 2018-03-18 RX ADMIN — Medication 975 MILLIGRAM(S): at 21:48

## 2018-03-18 RX ADMIN — Medication 975 MILLIGRAM(S): at 21:18

## 2018-03-18 RX ADMIN — Medication 5 MILLIGRAM(S): at 22:56

## 2018-03-18 RX ADMIN — OXYCODONE HYDROCHLORIDE 10 MILLIGRAM(S): 5 TABLET ORAL at 11:24

## 2018-03-18 RX ADMIN — ESCITALOPRAM OXALATE 5 MILLIGRAM(S): 10 TABLET, FILM COATED ORAL at 11:14

## 2018-03-18 RX ADMIN — Medication 975 MILLIGRAM(S): at 14:45

## 2018-03-18 RX ADMIN — OXYCODONE HYDROCHLORIDE 10 MILLIGRAM(S): 5 TABLET ORAL at 22:41

## 2018-03-18 RX ADMIN — OXYCODONE HYDROCHLORIDE 10 MILLIGRAM(S): 5 TABLET ORAL at 12:15

## 2018-03-18 RX ADMIN — HYDROMORPHONE HYDROCHLORIDE 1 MILLIGRAM(S): 2 INJECTION INTRAMUSCULAR; INTRAVENOUS; SUBCUTANEOUS at 07:40

## 2018-03-18 RX ADMIN — Medication 975 MILLIGRAM(S): at 15:30

## 2018-03-18 NOTE — PROGRESS NOTE ADULT - SUBJECTIVE AND OBJECTIVE BOX
SUBJECTIVE:    Pt seen and examined at bedside this AM, she reports that overall she is feeling better, but still pain level is high and she claims she's too weak to function well at home without assistance with her 3 children. pain in her lt arm is improving but she reports pain bursts n certain arm positions. yesterday lt arm US R/O DVT.  upon examination rt flap vioptix read signal above 90% with signal strength above 80. left flap has strong doppler signal with warm pink skin and good capillary refill. -- ABDOMEN: Soft. No collections. Incision intact. Umbilicus viable. Drains serosanguinous.    OBJECTIVE:     ** VITAL SIGNS / I&O's **    T(C): 36.4 (03-18-18 @ 08:20), Max: 37.2 (03-17-18 @ 14:24)  T(F): 97.5 (03-18-18 @ 08:20), Max: 98.9 (03-17-18 @ 14:24)  HR: 89 (03-18-18 @ 08:20) (89 - 105)  BP: 111/69 (03-18-18 @ 08:20) (102/65 - 124/73)  RR: 17 (03-18-18 @ 08:20) (16 - 17)  SpO2: 100% (03-18-18 @ 08:20) (96% - 100%)      17 Mar 2018 07:01  -  18 Mar 2018 07:00  --------------------------------------------------------  IN:    dextrose 5% + sodium chloride 0.45%.: 1500 mL    Oral Fluid: 1620 mL  Total IN: 3120 mL    OUT:    Bulb: 40 mL    Bulb: 130 mL    Bulb: 60 mL    Bulb: 60 mL    Voided: 2550 mL  Total OUT: 2840 mL    Total NET: 280 mL      ** LABS**                 CAPILLARY BLOOD GLUCOSE

## 2018-03-19 RX ORDER — HYDROMORPHONE HYDROCHLORIDE 2 MG/ML
4 INJECTION INTRAMUSCULAR; INTRAVENOUS; SUBCUTANEOUS EVERY 4 HOURS
Qty: 0 | Refills: 0 | Status: DISCONTINUED | OUTPATIENT
Start: 2018-03-19 | End: 2018-03-20

## 2018-03-19 RX ORDER — IBUPROFEN 200 MG
600 TABLET ORAL EVERY 6 HOURS
Qty: 0 | Refills: 0 | Status: DISCONTINUED | OUTPATIENT
Start: 2018-03-19 | End: 2018-03-20

## 2018-03-19 RX ADMIN — HYDROMORPHONE HYDROCHLORIDE 4 MILLIGRAM(S): 2 INJECTION INTRAMUSCULAR; INTRAVENOUS; SUBCUTANEOUS at 10:30

## 2018-03-19 RX ADMIN — HYDROMORPHONE HYDROCHLORIDE 4 MILLIGRAM(S): 2 INJECTION INTRAMUSCULAR; INTRAVENOUS; SUBCUTANEOUS at 14:00

## 2018-03-19 RX ADMIN — HYDROMORPHONE HYDROCHLORIDE 1 MILLIGRAM(S): 2 INJECTION INTRAMUSCULAR; INTRAVENOUS; SUBCUTANEOUS at 00:21

## 2018-03-19 RX ADMIN — SODIUM CHLORIDE 75 MILLILITER(S): 9 INJECTION, SOLUTION INTRAVENOUS at 11:24

## 2018-03-19 RX ADMIN — Medication 975 MILLIGRAM(S): at 06:34

## 2018-03-19 RX ADMIN — OXYCODONE HYDROCHLORIDE 10 MILLIGRAM(S): 5 TABLET ORAL at 02:20

## 2018-03-19 RX ADMIN — Medication 600 MILLIGRAM(S): at 20:19

## 2018-03-19 RX ADMIN — HYDROMORPHONE HYDROCHLORIDE 1 MILLIGRAM(S): 2 INJECTION INTRAMUSCULAR; INTRAVENOUS; SUBCUTANEOUS at 04:52

## 2018-03-19 RX ADMIN — Medication 5 MILLIGRAM(S): at 11:27

## 2018-03-19 RX ADMIN — HYDROMORPHONE HYDROCHLORIDE 4 MILLIGRAM(S): 2 INJECTION INTRAMUSCULAR; INTRAVENOUS; SUBCUTANEOUS at 13:43

## 2018-03-19 RX ADMIN — HYDROMORPHONE HYDROCHLORIDE 4 MILLIGRAM(S): 2 INJECTION INTRAMUSCULAR; INTRAVENOUS; SUBCUTANEOUS at 23:10

## 2018-03-19 RX ADMIN — HYDROMORPHONE HYDROCHLORIDE 4 MILLIGRAM(S): 2 INJECTION INTRAMUSCULAR; INTRAVENOUS; SUBCUTANEOUS at 18:40

## 2018-03-19 RX ADMIN — HYDROMORPHONE HYDROCHLORIDE 4 MILLIGRAM(S): 2 INJECTION INTRAMUSCULAR; INTRAVENOUS; SUBCUTANEOUS at 22:03

## 2018-03-19 RX ADMIN — Medication 5 MILLIGRAM(S): at 19:10

## 2018-03-19 RX ADMIN — OXYCODONE HYDROCHLORIDE 10 MILLIGRAM(S): 5 TABLET ORAL at 02:50

## 2018-03-19 RX ADMIN — Medication 100 MILLIGRAM(S): at 13:21

## 2018-03-19 RX ADMIN — Medication 600 MILLIGRAM(S): at 21:51

## 2018-03-19 RX ADMIN — Medication 975 MILLIGRAM(S): at 13:21

## 2018-03-19 RX ADMIN — HYDROMORPHONE HYDROCHLORIDE 4 MILLIGRAM(S): 2 INJECTION INTRAMUSCULAR; INTRAVENOUS; SUBCUTANEOUS at 17:46

## 2018-03-19 RX ADMIN — OXYCODONE HYDROCHLORIDE 10 MILLIGRAM(S): 5 TABLET ORAL at 07:04

## 2018-03-19 RX ADMIN — Medication 975 MILLIGRAM(S): at 13:53

## 2018-03-19 RX ADMIN — SENNA PLUS 2 TABLET(S): 8.6 TABLET ORAL at 22:16

## 2018-03-19 RX ADMIN — Medication 10 MILLIGRAM(S): at 11:33

## 2018-03-19 RX ADMIN — Medication 975 MILLIGRAM(S): at 07:04

## 2018-03-19 RX ADMIN — Medication 100 MILLIGRAM(S): at 06:34

## 2018-03-19 RX ADMIN — ONDANSETRON 4 MILLIGRAM(S): 8 TABLET, FILM COATED ORAL at 11:23

## 2018-03-19 RX ADMIN — ENOXAPARIN SODIUM 40 MILLIGRAM(S): 100 INJECTION SUBCUTANEOUS at 07:38

## 2018-03-19 RX ADMIN — Medication 975 MILLIGRAM(S): at 23:08

## 2018-03-19 RX ADMIN — HYDROMORPHONE HYDROCHLORIDE 4 MILLIGRAM(S): 2 INJECTION INTRAMUSCULAR; INTRAVENOUS; SUBCUTANEOUS at 09:33

## 2018-03-19 RX ADMIN — OXYCODONE HYDROCHLORIDE 10 MILLIGRAM(S): 5 TABLET ORAL at 06:34

## 2018-03-19 RX ADMIN — Medication 100 MILLIGRAM(S): at 22:03

## 2018-03-19 RX ADMIN — ESCITALOPRAM OXALATE 5 MILLIGRAM(S): 10 TABLET, FILM COATED ORAL at 11:24

## 2018-03-19 RX ADMIN — HYDROMORPHONE HYDROCHLORIDE 1 MILLIGRAM(S): 2 INJECTION INTRAMUSCULAR; INTRAVENOUS; SUBCUTANEOUS at 00:40

## 2018-03-19 NOTE — DIETITIAN INITIAL EVALUATION ADULT. - OTHER INFO
27F w/ h/o L breast ca s/p NAC, bilateral mastectomy w/ Rupali & XRT who presented to [facility] 3/14 for elective breast reconstruction w/ bilateral IKER flaps. pt currently on regular diet and tolerating PO. Poor PO intake, <25% meal. Per dietary recall pt had ~50% soup. c/o nausea and pain- RN aware and both are being medically managed. Discussed increased nutrient intake and optimizing intake when pain and nausea symptoms are alleviated. Has not had BM since Sx, currently on bowel regimen. No significant wt changes. NKFA or dietary restrictions. Skin and GI WDL per flowsheet.

## 2018-03-19 NOTE — DIETITIAN INITIAL EVALUATION ADULT. - ENERGY NEEDS
Height: 5'1" Weight: 103lbs, IBW 105lbs+/-10%, %IBW 98%, BMI 19.5  ABW used for calculations as pt between % of IBW.   Nutrient needs based on [facility] standards of care for maintenance in adults.   needs adjusted for post-op

## 2018-03-19 NOTE — PROGRESS NOTE ADULT - ASSESSMENT
27F w/ h/o L breast ca s/p NAC, bilateral mastectomy w/ Rupali & XRT who presented to [facility] 3/14 for elective breast reconstruction w/ bilateral IKER flaps. Flaps healthy however pt reports feeling deconditioned.     -Pain control PRN w/ PO medications  -Regular diet  -OT/PT  -CALIXTO drain care  -Lovenox   -Discussed w/ chief resident & attending

## 2018-03-19 NOTE — PROGRESS NOTE ADULT - SUBJECTIVE AND OBJECTIVE BOX
GENERAL SURGERY CONSULT PROGRESS NOTE    SUBJECTIVE:   Pt seen & examined at bedside. Reports improvement in breast pain but persistent severe abdominal pain & overall sense of deconditioning-- states that she is unable to stand up or walk independently. Requested breakthrough Dilaudid 1mg x 5 times over past 24h despite oxy Tylenol & oxycodone. Reports better function w/ LUE. No F/C, N/V, CP/SOB.    MEDICATIONS:  ---NEURO-  acetaminophen   Tablet. 975 milliGRAM(s) Oral every 8 hours  diazepam    Tablet 5 milliGRAM(s) Oral every 6 hours PRN  escitalopram 5 milliGRAM(s) Oral daily  hydrOXYzine hydrochloride 10 milliGRAM(s) Oral daily  metoclopramide Injectable 10 milliGRAM(s) IV Push every 6 hours PRN  ondansetron Injectable 4 milliGRAM(s) IV Push every 6 hours PRN  oxyCODONE    IR 5 milliGRAM(s) Oral every 4 hours PRN  oxyCODONE    IR 10 milliGRAM(s) Oral every 4 hours PRN  ---GI/FEN-  docusate sodium 100 milliGRAM(s) Oral three times a day  senna 2 Tablet(s) Oral at bedtime PRN  dextrose 5% + sodium chloride 0.45%. 1000 milliLiter(s) IV Continuous <Continuous>  ---HEME-  enoxaparin Injectable 40 milliGRAM(s) SubCutaneous every 24 hours  ---OTHER-  influenza   Vaccine 0.5 milliLiter(s) IntraMuscular once    VOLUME STATUS:  18 Mar 2018 07:01  -  19 Mar 2018 07:00  --------------------------------------------------------  IN:    dextrose 5% + sodium chloride 0.45%.: 900 mL    Oral Fluid: 900 mL  Total IN: 1800 mL  OUT:    Bulb: 20 mL    Bulb: 20 mL    Bulb: 30 mL    Bulb: 40 mL    Voided: 1300 mL  Total OUT: 1410 mL  Total NET: 390 mL    19 Mar 2018 07:01  -  19 Mar 2018 07:48  --------------------------------------------------------  IN:    dextrose 5% + sodium chloride 0.45%.: 75 mL  Total IN: 75 mL  OUT:  Total OUT: 0 mL  Total NET: 75 mL    VITALS:  Vital Signs Last 24 Hrs  T(C): 36.8 (19 Mar 2018 05:37), Max: 36.8 (19 Mar 2018 05:37)  T(F): 98.2 (19 Mar 2018 05:37), Max: 98.2 (19 Mar 2018 05:37)  HR: 94 (19 Mar 2018 05:37) (89 - 95)  BP: 109/66 (19 Mar 2018 05:37) (105/64 - 111/69)  BP(mean): --  RR: 16 (19 Mar 2018 05:37) (16 - 17)  SpO2: 98% (19 Mar 2018 05:37) (97% - 100%)    PHYSICAL EXAM:  BREASTS: Bilateral breast flaps pink w/ good cap refill. L breast flap doppler signal strong. R NAC viable. Incisions healing well w/ no surrounding warmth, erythema or induration. CALIXTO drains w/ serosanguinous output bilaterally.   ABD: Umbilicus viable. Transverse lower abd incision healing well w/ no surrounding warmth, erythema or induration. CALIXTO drains w/ serosanguinous output bilaterally.     LABS: PLASTIC SURGERY PROGRESS NOTE    SUBJECTIVE:   Pt seen & examined at bedside. Reports improvement in breast pain but persistent severe abdominal pain & overall sense of deconditioning-- states that she is unable to stand up or walk independently. Requested breakthrough Dilaudid 1mg x 5 times over past 24h despite oxy Tylenol & oxycodone. Reports better function w/ LUE. No F/C, N/V, CP/SOB.    MEDICATIONS:  ---NEURO-  acetaminophen   Tablet. 975 milliGRAM(s) Oral every 8 hours  diazepam    Tablet 5 milliGRAM(s) Oral every 6 hours PRN  escitalopram 5 milliGRAM(s) Oral daily  hydrOXYzine hydrochloride 10 milliGRAM(s) Oral daily  metoclopramide Injectable 10 milliGRAM(s) IV Push every 6 hours PRN  ondansetron Injectable 4 milliGRAM(s) IV Push every 6 hours PRN  oxyCODONE    IR 5 milliGRAM(s) Oral every 4 hours PRN  oxyCODONE    IR 10 milliGRAM(s) Oral every 4 hours PRN  ---GI/FEN-  docusate sodium 100 milliGRAM(s) Oral three times a day  senna 2 Tablet(s) Oral at bedtime PRN  dextrose 5% + sodium chloride 0.45%. 1000 milliLiter(s) IV Continuous <Continuous>  ---HEME-  enoxaparin Injectable 40 milliGRAM(s) SubCutaneous every 24 hours  ---OTHER-	  influenza   Vaccine 0.5 milliLiter(s) IntraMuscular once    VOLUME STATUS:  18 Mar 2018 07:01  -  19 Mar 2018 07:00  --------------------------------------------------------  IN:    dextrose 5% + sodium chloride 0.45%.: 900 mL    Oral Fluid: 900 mL  Total IN: 1800 mL  OUT:    Bulb: 20 mL    Bulb: 20 mL    Bulb: 30 mL    Bulb: 40 mL    Voided: 1300 mL  Total OUT: 1410 mL  Total NET: 390 mL    19 Mar 2018 07:01  -  19 Mar 2018 07:48  --------------------------------------------------------  IN:    dextrose 5% + sodium chloride 0.45%.: 75 mL  Total IN: 75 mL  OUT:  Total OUT: 0 mL  Total NET: 75 mL    VITALS:  Vital Signs Last 24 Hrs  T(C): 36.8 (19 Mar 2018 05:37), Max: 36.8 (19 Mar 2018 05:37)  T(F): 98.2 (19 Mar 2018 05:37), Max: 98.2 (19 Mar 2018 05:37)  HR: 94 (19 Mar 2018 05:37) (89 - 95)  BP: 109/66 (19 Mar 2018 05:37) (105/64 - 111/69)  BP(mean): --  RR: 16 (19 Mar 2018 05:37) (16 - 17)  SpO2: 98% (19 Mar 2018 05:37) (97% - 100%)    PHYSICAL EXAM:  BREASTS: Bilateral breast flaps pink w/ good cap refill. L breast flap doppler signal strong. R NAC viable. Incisions healing well w/ no surrounding warmth, erythema or induration. CALIXTO drains w/ serosanguinous output bilaterally.   ABD: Umbilicus viable. Transverse lower abd incision healing well w/ no surrounding warmth, erythema or induration. CALIXTO drains w/ serosanguinous output bilaterally.     LABS:

## 2018-03-20 VITALS
HEART RATE: 93 BPM | OXYGEN SATURATION: 97 % | DIASTOLIC BLOOD PRESSURE: 66 MMHG | SYSTOLIC BLOOD PRESSURE: 103 MMHG | RESPIRATION RATE: 16 BRPM | TEMPERATURE: 99 F

## 2018-03-20 PROCEDURE — 86901 BLOOD TYPING SEROLOGIC RH(D): CPT

## 2018-03-20 PROCEDURE — C1781: CPT

## 2018-03-20 PROCEDURE — C1889: CPT

## 2018-03-20 PROCEDURE — 85027 COMPLETE CBC AUTOMATED: CPT

## 2018-03-20 PROCEDURE — 88305 TISSUE EXAM BY PATHOLOGIST: CPT

## 2018-03-20 PROCEDURE — 80048 BASIC METABOLIC PNL TOTAL CA: CPT

## 2018-03-20 PROCEDURE — 86850 RBC ANTIBODY SCREEN: CPT

## 2018-03-20 PROCEDURE — 36415 COLL VENOUS BLD VENIPUNCTURE: CPT

## 2018-03-20 PROCEDURE — 86900 BLOOD TYPING SEROLOGIC ABO: CPT

## 2018-03-20 PROCEDURE — 97116 GAIT TRAINING THERAPY: CPT

## 2018-03-20 PROCEDURE — 93971 EXTREMITY STUDY: CPT

## 2018-03-20 PROCEDURE — 97110 THERAPEUTIC EXERCISES: CPT

## 2018-03-20 PROCEDURE — 97162 PT EVAL MOD COMPLEX 30 MIN: CPT

## 2018-03-20 PROCEDURE — 88300 SURGICAL PATH GROSS: CPT

## 2018-03-20 RX ORDER — HYDROMORPHONE HYDROCHLORIDE 2 MG/ML
1 INJECTION INTRAMUSCULAR; INTRAVENOUS; SUBCUTANEOUS
Qty: 42 | Refills: 0 | OUTPATIENT
Start: 2018-03-20 | End: 2018-03-26

## 2018-03-20 RX ORDER — PANTOPRAZOLE SODIUM 20 MG/1
40 TABLET, DELAYED RELEASE ORAL
Qty: 0 | Refills: 0 | Status: DISCONTINUED | OUTPATIENT
Start: 2018-03-20 | End: 2018-03-20

## 2018-03-20 RX ORDER — ONDANSETRON 8 MG/1
1 TABLET, FILM COATED ORAL
Qty: 21 | Refills: 0 | OUTPATIENT
Start: 2018-03-20 | End: 2018-03-26

## 2018-03-20 RX ORDER — DIAZEPAM 5 MG
1 TABLET ORAL
Qty: 28 | Refills: 0 | OUTPATIENT
Start: 2018-03-20 | End: 2018-03-26

## 2018-03-20 RX ORDER — IBUPROFEN 200 MG
1 TABLET ORAL
Qty: 28 | Refills: 0 | OUTPATIENT
Start: 2018-03-20 | End: 2018-03-26

## 2018-03-20 RX ADMIN — Medication 975 MILLIGRAM(S): at 13:19

## 2018-03-20 RX ADMIN — HYDROMORPHONE HYDROCHLORIDE 4 MILLIGRAM(S): 2 INJECTION INTRAMUSCULAR; INTRAVENOUS; SUBCUTANEOUS at 06:20

## 2018-03-20 RX ADMIN — Medication 100 MILLIGRAM(S): at 13:20

## 2018-03-20 RX ADMIN — HYDROMORPHONE HYDROCHLORIDE 4 MILLIGRAM(S): 2 INJECTION INTRAMUSCULAR; INTRAVENOUS; SUBCUTANEOUS at 02:03

## 2018-03-20 RX ADMIN — Medication 5 MILLIGRAM(S): at 01:05

## 2018-03-20 RX ADMIN — ENOXAPARIN SODIUM 40 MILLIGRAM(S): 100 INJECTION SUBCUTANEOUS at 07:27

## 2018-03-20 RX ADMIN — PANTOPRAZOLE SODIUM 40 MILLIGRAM(S): 20 TABLET, DELAYED RELEASE ORAL at 07:27

## 2018-03-20 RX ADMIN — Medication 5 MILLIGRAM(S): at 13:21

## 2018-03-20 RX ADMIN — Medication 600 MILLIGRAM(S): at 03:03

## 2018-03-20 RX ADMIN — Medication 975 MILLIGRAM(S): at 00:00

## 2018-03-20 RX ADMIN — HYDROMORPHONE HYDROCHLORIDE 4 MILLIGRAM(S): 2 INJECTION INTRAMUSCULAR; INTRAVENOUS; SUBCUTANEOUS at 07:25

## 2018-03-20 RX ADMIN — Medication 975 MILLIGRAM(S): at 07:27

## 2018-03-20 RX ADMIN — Medication 975 MILLIGRAM(S): at 14:00

## 2018-03-20 RX ADMIN — HYDROMORPHONE HYDROCHLORIDE 4 MILLIGRAM(S): 2 INJECTION INTRAMUSCULAR; INTRAVENOUS; SUBCUTANEOUS at 02:40

## 2018-03-20 RX ADMIN — Medication 600 MILLIGRAM(S): at 09:04

## 2018-03-20 RX ADMIN — Medication 100 MILLIGRAM(S): at 06:20

## 2018-03-20 RX ADMIN — HYDROMORPHONE HYDROCHLORIDE 4 MILLIGRAM(S): 2 INJECTION INTRAMUSCULAR; INTRAVENOUS; SUBCUTANEOUS at 10:51

## 2018-03-20 RX ADMIN — SODIUM CHLORIDE 75 MILLILITER(S): 9 INJECTION, SOLUTION INTRAVENOUS at 01:57

## 2018-03-20 RX ADMIN — ESCITALOPRAM OXALATE 5 MILLIGRAM(S): 10 TABLET, FILM COATED ORAL at 11:01

## 2018-03-20 RX ADMIN — Medication 10 MILLIGRAM(S): at 11:03

## 2018-03-20 RX ADMIN — HYDROMORPHONE HYDROCHLORIDE 4 MILLIGRAM(S): 2 INJECTION INTRAMUSCULAR; INTRAVENOUS; SUBCUTANEOUS at 16:30

## 2018-03-20 RX ADMIN — Medication 600 MILLIGRAM(S): at 03:30

## 2018-03-20 RX ADMIN — HYDROMORPHONE HYDROCHLORIDE 4 MILLIGRAM(S): 2 INJECTION INTRAMUSCULAR; INTRAVENOUS; SUBCUTANEOUS at 15:28

## 2018-03-20 RX ADMIN — Medication 975 MILLIGRAM(S): at 07:39

## 2018-03-20 RX ADMIN — ONDANSETRON 4 MILLIGRAM(S): 8 TABLET, FILM COATED ORAL at 10:58

## 2018-03-20 RX ADMIN — HYDROMORPHONE HYDROCHLORIDE 4 MILLIGRAM(S): 2 INJECTION INTRAMUSCULAR; INTRAVENOUS; SUBCUTANEOUS at 11:45

## 2018-03-20 RX ADMIN — Medication 600 MILLIGRAM(S): at 16:45

## 2018-03-20 NOTE — PROGRESS NOTE ADULT - SUBJECTIVE AND OBJECTIVE BOX
GENERAL SURGERY CONSULT PROGRESS NOTE    SUBJECTIVE:   Pt seen & examined at bedside. Pain better controlled w/ PO Dilaudid. No F/C, N/V, CP/SOB.    MEDICATIONS:  ---NEURO-  acetaminophen   Tablet. 975 milliGRAM(s) Oral every 8 hours  diazepam    Tablet 5 milliGRAM(s) Oral every 6 hours PRN  escitalopram 5 milliGRAM(s) Oral daily  HYDROmorphone   Tablet 4 milliGRAM(s) Oral every 4 hours PRN  hydrOXYzine hydrochloride 10 milliGRAM(s) Oral daily  ibuprofen  Tablet 600 milliGRAM(s) Oral every 6 hours  metoclopramide Injectable 10 milliGRAM(s) IV Push every 6 hours PRN  ondansetron Injectable 4 milliGRAM(s) IV Push every 6 hours PRN  ---GI/FEN-  docusate sodium 100 milliGRAM(s) Oral three times a day  senna 2 Tablet(s) Oral at bedtime PRN  dextrose 5% + sodium chloride 0.45%. 1000 milliLiter(s) IV Continuous <Continuous>  ---HEME-  enoxaparin Injectable 40 milliGRAM(s) SubCutaneous every 24 hours  ---OTHER-  influenza   Vaccine 0.5 milliLiter(s) IntraMuscular once        VOLUME STATUS:  18 Mar 2018 07:01  -  19 Mar 2018 07:00  --------------------------------------------------------  IN:    dextrose 5% + sodium chloride 0.45%.: 900 mL    Oral Fluid: 900 mL  Total IN: 1800 mL  OUT:    Bulb: 20 mL    Bulb: 20 mL    Bulb: 30 mL    Bulb: 40 mL    Voided: 1300 mL  Total OUT: 1410 mL  Total NET: 390 mL    19 Mar 2018 07:01  -  20 Mar 2018 06:44  --------------------------------------------------------  IN:    dextrose 5% + sodium chloride 0.45%.: 1800 mL    Oral Fluid: 1060 mL  Total IN: 2860 mL  OUT:    Bulb: 37.5 mL    Bulb: 60 mL    Bulb: 27.5 mL    Bulb: 120 mL    Voided: 1800 mL  Total OUT: 2045 mL  Total NET: 815 mL    VITALS:  Vital Signs Last 24 Hrs  T(C): 36.3 (20 Mar 2018 05:35), Max: 37.6 (19 Mar 2018 20:45)  T(F): 97.3 (20 Mar 2018 05:35), Max: 99.6 (19 Mar 2018 20:45)  HR: 81 (20 Mar 2018 05:35) (81 - 101)  BP: 118/69 (20 Mar 2018 05:35) (103/58 - 118/69)  BP(mean): --  RR: 16 (20 Mar 2018 05:35) (16 - 17)  SpO2: 97% (20 Mar 2018 05:35) (96% - 98%)    PHYSICAL EXAM:  BREASTS: Bilateral breast flaps pink w/ good cap refill. L breast flap doppler signal strong. R NAC viable. Incisions healing well w/ no surrounding warmth, erythema or induration. CALIXTO drains w/ serosanguinous output bilaterally.   ABD: Umbilicus viable. Transverse lower abd incision healing well w/ no surrounding warmth, erythema or induration. CALIXTO drains w/ serosanguinous output bilaterally.     LABS: PLASTIC SURGERY CONSULT PROGRESS NOTE    SUBJECTIVE:   Pt seen & examined at bedside. Pain better controlled w/ PO Dilaudid. No F/C, N/V, CP/SOB.    MEDICATIONS:  ---NEURO-  acetaminophen   Tablet. 975 milliGRAM(s) Oral every 8 hours  diazepam    Tablet 5 milliGRAM(s) Oral every 6 hours PRN  escitalopram 5 milliGRAM(s) Oral daily  HYDROmorphone   Tablet 4 milliGRAM(s) Oral every 4 hours PRN  hydrOXYzine hydrochloride 10 milliGRAM(s) Oral daily  ibuprofen  Tablet 600 milliGRAM(s) Oral every 6 hours  metoclopramide Injectable 10 milliGRAM(s) IV Push every 6 hours PRN  ondansetron Injectable 4 milliGRAM(s) IV Push every 6 hours PRN  ---GI/FEN-  docusate sodium 100 milliGRAM(s) Oral three times a day  senna 2 Tablet(s) Oral at bedtime PRN  dextrose 5% + sodium chloride 0.45%. 1000 milliLiter(s) IV Continuous <Continuous>  ---HEME-  enoxaparin Injectable 40 milliGRAM(s) SubCutaneous every 24 hours  ---OTHER-  influenza   Vaccine 0.5 milliLiter(s) IntraMuscular once        VOLUME STATUS:  18 Mar 2018 07:01  -  19 Mar 2018 07:00  --------------------------------------------------------  IN:    dextrose 5% + sodium chloride 0.45%.: 900 mL    Oral Fluid: 900 mL  Total IN: 1800 mL  OUT:    Bulb: 20 mL    Bulb: 20 mL    Bulb: 30 mL    Bulb: 40 mL    Voided: 1300 mL  Total OUT: 1410 mL  Total NET: 390 mL    19 Mar 2018 07:01  -  20 Mar 2018 06:44  --------------------------------------------------------  IN:    dextrose 5% + sodium chloride 0.45%.: 1800 mL    Oral Fluid: 1060 mL  Total IN: 2860 mL  OUT:    Bulb: 37.5 mL    Bulb: 60 mL    Bulb: 27.5 mL    Bulb: 120 mL    Voided: 1800 mL  Total OUT: 2045 mL  Total NET: 815 mL    VITALS:  Vital Signs Last 24 Hrs  T(C): 36.3 (20 Mar 2018 05:35), Max: 37.6 (19 Mar 2018 20:45)  T(F): 97.3 (20 Mar 2018 05:35), Max: 99.6 (19 Mar 2018 20:45)  HR: 81 (20 Mar 2018 05:35) (81 - 101)  BP: 118/69 (20 Mar 2018 05:35) (103/58 - 118/69)  BP(mean): --  RR: 16 (20 Mar 2018 05:35) (16 - 17)  SpO2: 97% (20 Mar 2018 05:35) (96% - 98%)    PHYSICAL EXAM:  BREASTS: Bilateral breast flaps pink w/ good cap refill. L breast flap doppler signal strong. R NAC viable. Incisions healing well w/ no surrounding warmth, erythema or induration. CALIXTO drains w/ serosanguinous output bilaterally.   ABD: Umbilicus viable. Transverse lower abd incision healing well w/ no surrounding warmth, erythema or induration. CALIXTO drains w/ serosanguinous output bilaterally.     LABS:

## 2018-03-20 NOTE — PROGRESS NOTE ADULT - ASSESSMENT
27F w/ h/o L breast ca s/p NAC, bilateral mastectomy w/ Rupali & XRT who presented to [facility] 3/14 for elective breast reconstruction w/ bilateral IKER flaps. Flaps healthy w/ improved pain control.      -Pain control PRN w/ PO medications  -Regular diet  -OT/PT  -CALIXTO drain care  -Lovenox   -Discussed w/ chief resident & attending

## 2018-03-21 ENCOUNTER — OTHER (OUTPATIENT)
Age: 28
End: 2018-03-21

## 2018-03-21 PROBLEM — F41.9 ANXIETY DISORDER, UNSPECIFIED: Chronic | Status: ACTIVE | Noted: 2018-03-08

## 2018-03-21 PROBLEM — F32.9 MAJOR DEPRESSIVE DISORDER, SINGLE EPISODE, UNSPECIFIED: Chronic | Status: ACTIVE | Noted: 2018-03-08

## 2018-03-21 RX ORDER — ONDANSETRON 4 MG/1
4 TABLET, ORALLY DISINTEGRATING ORAL
Qty: 12 | Refills: 0 | Status: DISCONTINUED | COMMUNITY
Start: 2018-03-21 | End: 2018-03-21

## 2018-03-22 DIAGNOSIS — Z42.1 ENCOUNTER FOR BREAST RECONSTRUCTION FOLLOWING MASTECTOMY: ICD-10-CM

## 2018-03-22 DIAGNOSIS — Z90.13 ACQUIRED ABSENCE OF BILATERAL BREASTS AND NIPPLES: ICD-10-CM

## 2018-03-22 DIAGNOSIS — Z92.3 PERSONAL HISTORY OF IRRADIATION: ICD-10-CM

## 2018-03-22 DIAGNOSIS — M95.4 ACQUIRED DEFORMITY OF CHEST AND RIB: ICD-10-CM

## 2018-03-22 DIAGNOSIS — T85.44XA CAPSULAR CONTRACTURE OF BREAST IMPLANT, INITIAL ENCOUNTER: ICD-10-CM

## 2018-03-22 DIAGNOSIS — Z85.3 PERSONAL HISTORY OF MALIGNANT NEOPLASM OF BREAST: ICD-10-CM

## 2018-03-22 DIAGNOSIS — Z92.21 PERSONAL HISTORY OF ANTINEOPLASTIC CHEMOTHERAPY: ICD-10-CM
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
## 2018-03-22 LAB — SURGICAL PATHOLOGY STUDY: SIGNIFICANT CHANGE UP

## 2018-03-29 ENCOUNTER — APPOINTMENT (OUTPATIENT)
Dept: PLASTIC SURGERY | Facility: CLINIC | Age: 28
End: 2018-03-29
Payer: COMMERCIAL

## 2018-03-29 VITALS — WEIGHT: 100 LBS | BODY MASS INDEX: 18.88 KG/M2 | HEIGHT: 61 IN

## 2018-03-29 DIAGNOSIS — Z42.1 ENCOUNTER FOR BREAST RECONSTRUCTION FOLLOWING MASTECTOMY: ICD-10-CM

## 2018-03-29 PROCEDURE — 99024 POSTOP FOLLOW-UP VISIT: CPT

## 2018-04-02 ENCOUNTER — CHART COPY (OUTPATIENT)
Age: 28
End: 2018-04-02

## 2018-04-05 ENCOUNTER — APPOINTMENT (OUTPATIENT)
Dept: PLASTIC SURGERY | Facility: CLINIC | Age: 28
End: 2018-04-05
Payer: COMMERCIAL

## 2018-04-05 VITALS — HEIGHT: 61 IN | BODY MASS INDEX: 18.88 KG/M2 | WEIGHT: 100 LBS

## 2018-04-05 PROCEDURE — 99024 POSTOP FOLLOW-UP VISIT: CPT

## 2018-04-07 NOTE — H&P PST ADULT - I WAS PHYSICALLY PRESENT FOR THE KEY PORTIONS OF THE EVALUATION AND MANAGEMENT (E/M) SERVICE PROVIDED.  I AGREE WITH THE ABOVE HISTORY, PHYSICAL, AND PLAN WHICH I HAVE REVIEWED AND EDITED WHERE APPROPRIATE
PT/OT  Monitor bowel and bladder  Last BM 4/3.   4/3 Senna-colace 8.6-50 bid, lactulose q6h prn constipation. KUB showed mild-mod constipation. Baclofen 10 qhs  4/4 Change oxycodone-apap 10 q4h prn to oxycodone 10 q4h prn per patient request. Add flexeril 10 tid  4/7 received low air loss mattress    Statement Selected

## 2018-04-12 ENCOUNTER — APPOINTMENT (OUTPATIENT)
Dept: PLASTIC SURGERY | Facility: CLINIC | Age: 28
End: 2018-04-12

## 2018-05-16 ENCOUNTER — APPOINTMENT (OUTPATIENT)
Dept: BREAST CENTER | Facility: CLINIC | Age: 28
End: 2018-05-16

## 2018-10-18 PROBLEM — N39.0 URINARY TRACT INFECTION, SITE NOT SPECIFIED: Chronic | Status: ACTIVE | Noted: 2017-02-21

## 2018-10-18 PROBLEM — G43.909 MIGRAINE, UNSPECIFIED, NOT INTRACTABLE, WITHOUT STATUS MIGRAINOSUS: Chronic | Status: ACTIVE | Noted: 2017-02-21

## 2018-10-26 ENCOUNTER — OUTPATIENT (OUTPATIENT)
Dept: OUTPATIENT SERVICES | Facility: HOSPITAL | Age: 28
LOS: 1 days | End: 2018-10-26
Payer: COMMERCIAL

## 2018-10-26 ENCOUNTER — APPOINTMENT (OUTPATIENT)
Dept: MRI IMAGING | Facility: HOSPITAL | Age: 28
End: 2018-10-26
Payer: COMMERCIAL

## 2018-10-26 DIAGNOSIS — Z98.890 OTHER SPECIFIED POSTPROCEDURAL STATES: Chronic | ICD-10-CM

## 2018-10-26 DIAGNOSIS — Z95.828 PRESENCE OF OTHER VASCULAR IMPLANTS AND GRAFTS: Chronic | ICD-10-CM

## 2018-10-26 PROCEDURE — 70553 MRI BRAIN STEM W/O & W/DYE: CPT | Mod: 26

## 2018-10-26 PROCEDURE — 70544 MR ANGIOGRAPHY HEAD W/O DYE: CPT | Mod: 26,59

## 2018-10-26 PROCEDURE — A9585: CPT

## 2018-10-26 PROCEDURE — 70549 MR ANGIOGRAPH NECK W/O&W/DYE: CPT | Mod: 26

## 2018-10-26 PROCEDURE — 70549 MR ANGIOGRAPH NECK W/O&W/DYE: CPT

## 2018-10-26 PROCEDURE — 70553 MRI BRAIN STEM W/O & W/DYE: CPT

## 2018-10-26 PROCEDURE — 70544 MR ANGIOGRAPHY HEAD W/O DYE: CPT

## 2018-10-30 ENCOUNTER — APPOINTMENT (OUTPATIENT)
Dept: RADIATION ONCOLOGY | Facility: CLINIC | Age: 28
End: 2018-10-30
Payer: COMMERCIAL

## 2018-10-30 ENCOUNTER — APPOINTMENT (OUTPATIENT)
Dept: NEUROSURGERY | Facility: CLINIC | Age: 28
End: 2018-10-30
Payer: COMMERCIAL

## 2018-10-30 VITALS
DIASTOLIC BLOOD PRESSURE: 67 MMHG | BODY MASS INDEX: 19.23 KG/M2 | HEART RATE: 63 BPM | SYSTOLIC BLOOD PRESSURE: 107 MMHG | RESPIRATION RATE: 16 BRPM | OXYGEN SATURATION: 100 % | WEIGHT: 101.8 LBS

## 2018-10-30 DIAGNOSIS — Z92.3 PERSONAL HISTORY OF IRRADIATION: ICD-10-CM

## 2018-10-30 DIAGNOSIS — Z92.21 PERSONAL HISTORY OF ANTINEOPLASTIC CHEMOTHERAPY: ICD-10-CM

## 2018-10-30 DIAGNOSIS — Z85.3 PERSONAL HISTORY OF MALIGNANT NEOPLASM OF BREAST: ICD-10-CM

## 2018-10-30 PROCEDURE — 99205 OFFICE O/P NEW HI 60 MIN: CPT | Mod: NC

## 2018-10-30 PROCEDURE — 99204 OFFICE O/P NEW MOD 45 MIN: CPT

## 2018-10-30 PROCEDURE — ZZZZZ: CPT

## 2018-10-30 PROCEDURE — 99214 OFFICE O/P EST MOD 30 MIN: CPT | Mod: 25

## 2018-10-30 RX ORDER — ESCITALOPRAM OXALATE 5 MG/1
5 TABLET, FILM COATED ORAL DAILY
Refills: 0 | Status: DISCONTINUED | COMMUNITY
Start: 2018-03-29 | End: 2018-10-30

## 2018-10-30 RX ORDER — NITROFURANTOIN (MONOHYDRATE/MACROCRYSTALS) 25; 75 MG/1; MG/1
100 CAPSULE ORAL
Qty: 14 | Refills: 0 | Status: DISCONTINUED | COMMUNITY
Start: 2018-09-02

## 2018-10-30 RX ORDER — CYCLOBENZAPRINE HYDROCHLORIDE 5 MG/1
5 TABLET, FILM COATED ORAL
Qty: 20 | Refills: 0 | Status: DISCONTINUED | COMMUNITY
Start: 2018-10-03

## 2018-10-30 RX ORDER — CEPHALEXIN 500 MG/1
500 TABLET ORAL EVERY 8 HOURS
Qty: 30 | Refills: 0 | Status: DISCONTINUED | COMMUNITY
Start: 2018-03-29 | End: 2018-10-30

## 2018-10-30 RX ORDER — NAPROXEN SODIUM 550 MG/1
550 TABLET ORAL
Qty: 40 | Refills: 0 | Status: DISCONTINUED | COMMUNITY
Start: 2018-10-03

## 2018-10-30 RX ORDER — VENLAFAXINE HYDROCHLORIDE 75 MG/1
75 CAPSULE, EXTENDED RELEASE ORAL DAILY
Refills: 0 | Status: DISCONTINUED | COMMUNITY
Start: 2017-05-25 | End: 2018-10-30

## 2018-10-30 RX ORDER — MIRTAZAPINE 15 MG/1
15 TABLET, FILM COATED ORAL
Refills: 0 | Status: DISCONTINUED | COMMUNITY
End: 2018-10-30

## 2018-10-30 RX ORDER — DOCUSATE SODIUM 100 MG/1
100 CAPSULE ORAL 3 TIMES DAILY
Qty: 30 | Refills: 0 | Status: DISCONTINUED | COMMUNITY
Start: 2018-03-21 | End: 2018-10-30

## 2018-10-30 RX ORDER — PROCHLORPERAZINE MALEATE 10 MG/1
10 TABLET ORAL
Qty: 30 | Refills: 0 | Status: DISCONTINUED | COMMUNITY
Start: 2018-03-21 | End: 2018-10-30

## 2018-10-30 RX ORDER — DIAZEPAM 5 MG/1
5 TABLET ORAL
Qty: 20 | Refills: 0 | Status: DISCONTINUED | COMMUNITY
Start: 2018-03-29 | End: 2018-10-30

## 2018-10-30 RX ORDER — GABAPENTIN 100 MG/1
100 CAPSULE ORAL 3 TIMES DAILY
Qty: 100 | Refills: 0 | Status: DISCONTINUED | COMMUNITY
Start: 2018-03-29 | End: 2018-10-30

## 2018-10-31 NOTE — REVIEW OF SYSTEMS
[FreeTextEntry9] : pain to bilateral breasts [Fatigue] : fatigue [SOB on Exertion] : shortness of breath during exertion [Dizziness] : dizziness [Anxiety] : anxiety [Negative] : Heme/Lymph [Joint Pain] : joint pain [Fever] : no fever [Chills] : no chills [Night Sweats] : no night sweats [Recent Change In Weight] : ~T no recent weight change [Skin Rash] : no skin rash [Confused] : no confusion [Fainting] : no fainting [Difficulty Walking] : no difficulty walking [FreeTextEntry5] : right chest wall port [FreeTextEntry8] : pregnant [de-identified] : healed surgical incisions [de-identified] : as noted in HPI

## 2018-10-31 NOTE — REASON FOR VISIT
[Brain Metastasis] : brain metastasis [Parent] : parent [Consideration for Non-Curative Therapy] : consideration for non-curative therapy for [Breast Cancer] : breast cancer

## 2018-10-31 NOTE — PHYSICAL EXAM
[General Appearance - Alert] : alert [General Appearance - In No Acute Distress] : in no acute distress [Thin] : thin [Auscultation Breath Sounds / Voice Sounds] : lungs were clear to auscultation bilaterally [Heart Rate And Rhythm] : heart rate and rhythm were normal [Heart Sounds] : normal S1 and S2 [Bowel Sounds] : normal bowel sounds [Abdomen Soft] : soft [Cervical Lymph Nodes Enlarged Posterior Bilaterally] : posterior cervical [Cervical Lymph Nodes Enlarged Anterior Bilaterally] : anterior cervical [Supraclavicular Lymph Nodes Enlarged Bilaterally] : supraclavicular [Skin Color & Pigmentation] : normal skin color and pigmentation [] : no rash [No Focal Deficits] : no focal deficits [Oriented To Time, Place, And Person] : oriented to person, place, and time [Normal] : the ears, nose and oropharynx were normal in appearance [de-identified] : healed surgical scars [de-identified] : healed surgical scars

## 2018-10-31 NOTE — VITALS
[90: Able to carry normal activity; minor signs or symptoms of disease.] : 90: Able to carry normal activity; minor signs or symptoms of disease.  [Maximal Pain Intensity: 8/10] : 8/10 [Pain Location: ___] : Pain Location: [unfilled] [NoTreatment Scheduled] : no treatment scheduled [5 - Distress Level] : Distress Level: 5 [Least Pain Intensity: 0/10] : 0/10 [ECOG Performance Status: 1 - Restricted in physically strenuous activity but ambulatory and able to carry out work of a light or sedentary nature] : Performance Status: 1 - Restricted in physically strenuous activity but ambulatory and able to carry out work of a light or sedentary nature, e.g., light house work, office work

## 2018-10-31 NOTE — DISEASE MANAGEMENT
[Clinical] : TNM Stage: c [IV] : IV [FreeTextEntry4] : Recurrent, solitary metastatic lesion to brain [TTNM] : - [NTNM] : - [MTNM] : -
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
## 2018-10-31 NOTE — DATA REVIEWED
[FreeTextEntry1] : I have personally reviewed all relevant imaging studies (MRI) and I have discussed the case with the referring physician Dr Carmen.\par

## 2018-11-01 ENCOUNTER — OUTPATIENT (OUTPATIENT)
Dept: OUTPATIENT SERVICES | Facility: HOSPITAL | Age: 28
LOS: 1 days | Discharge: HOME | End: 2018-11-01
Payer: COMMERCIAL

## 2018-11-01 DIAGNOSIS — Z95.828 PRESENCE OF OTHER VASCULAR IMPLANTS AND GRAFTS: Chronic | ICD-10-CM

## 2018-11-01 DIAGNOSIS — Z98.890 OTHER SPECIFIED POSTPROCEDURAL STATES: Chronic | ICD-10-CM

## 2018-11-01 PROCEDURE — 77263 THER RADIOLOGY TX PLNG CPLX: CPT

## 2018-11-05 ENCOUNTER — OUTPATIENT (OUTPATIENT)
Dept: OUTPATIENT SERVICES | Facility: HOSPITAL | Age: 28
LOS: 1 days | End: 2018-11-05
Payer: COMMERCIAL

## 2018-11-05 ENCOUNTER — APPOINTMENT (OUTPATIENT)
Dept: CT IMAGING | Facility: HOSPITAL | Age: 28
End: 2018-11-05

## 2018-11-05 DIAGNOSIS — Z95.828 PRESENCE OF OTHER VASCULAR IMPLANTS AND GRAFTS: Chronic | ICD-10-CM

## 2018-11-05 DIAGNOSIS — Z98.890 OTHER SPECIFIED POSTPROCEDURAL STATES: Chronic | ICD-10-CM

## 2018-11-05 LAB
GLUCOSE BLDC GLUCOMTR-MCNC: 38 MG/DL — CRITICAL LOW (ref 70–99)
GLUCOSE BLDC GLUCOMTR-MCNC: 94 MG/DL — SIGNIFICANT CHANGE UP (ref 70–99)

## 2018-11-05 PROCEDURE — 78815 PET IMAGE W/CT SKULL-THIGH: CPT

## 2018-11-05 PROCEDURE — 74177 CT ABD & PELVIS W/CONTRAST: CPT

## 2018-11-05 PROCEDURE — 74177 CT ABD & PELVIS W/CONTRAST: CPT | Mod: 26

## 2018-11-05 PROCEDURE — 71260 CT THORAX DX C+: CPT | Mod: 26

## 2018-11-05 PROCEDURE — 82962 GLUCOSE BLOOD TEST: CPT

## 2018-11-05 PROCEDURE — A9552: CPT

## 2018-11-05 PROCEDURE — 71260 CT THORAX DX C+: CPT

## 2018-11-05 PROCEDURE — 78815 PET IMAGE W/CT SKULL-THIGH: CPT | Mod: 26

## 2018-11-06 ENCOUNTER — FORM ENCOUNTER (OUTPATIENT)
Age: 28
End: 2018-11-06

## 2018-11-06 ENCOUNTER — OUTPATIENT (OUTPATIENT)
Dept: OUTPATIENT SERVICES | Facility: HOSPITAL | Age: 28
LOS: 1 days | End: 2018-11-06
Payer: COMMERCIAL

## 2018-11-06 DIAGNOSIS — Z98.890 OTHER SPECIFIED POSTPROCEDURAL STATES: Chronic | ICD-10-CM

## 2018-11-06 DIAGNOSIS — Z95.828 PRESENCE OF OTHER VASCULAR IMPLANTS AND GRAFTS: Chronic | ICD-10-CM

## 2018-11-06 PROCEDURE — 78472 GATED HEART PLANAR SINGLE: CPT | Mod: 26

## 2018-11-06 PROCEDURE — A9560: CPT

## 2018-11-06 PROCEDURE — 78472 GATED HEART PLANAR SINGLE: CPT

## 2018-11-07 ENCOUNTER — OUTPATIENT (OUTPATIENT)
Dept: OUTPATIENT SERVICES | Facility: HOSPITAL | Age: 28
LOS: 1 days | End: 2018-11-07
Payer: COMMERCIAL

## 2018-11-07 ENCOUNTER — APPOINTMENT (OUTPATIENT)
Dept: NEUROSURGERY | Facility: AMBULATORY SURGERY CENTER | Age: 28
End: 2018-11-07
Payer: COMMERCIAL

## 2018-11-07 ENCOUNTER — APPOINTMENT (OUTPATIENT)
Dept: MRI IMAGING | Facility: IMAGING CENTER | Age: 28
End: 2018-11-07

## 2018-11-07 DIAGNOSIS — Z95.828 PRESENCE OF OTHER VASCULAR IMPLANTS AND GRAFTS: Chronic | ICD-10-CM

## 2018-11-07 DIAGNOSIS — Z98.890 OTHER SPECIFIED POSTPROCEDURAL STATES: Chronic | ICD-10-CM

## 2018-11-07 DIAGNOSIS — C79.31 SECONDARY MALIGNANT NEOPLASM OF BRAIN: ICD-10-CM

## 2018-11-07 PROCEDURE — 77295 3-D RADIOTHERAPY PLAN: CPT | Mod: 26

## 2018-11-07 PROCEDURE — 61796 SRS CRANIAL LESION SIMPLE: CPT

## 2018-11-07 PROCEDURE — 77432 STEREOTACTIC RADIATION TRMT: CPT

## 2018-11-07 PROCEDURE — 77300 RADIATION THERAPY DOSE PLAN: CPT | Mod: 26

## 2018-11-07 PROCEDURE — 61800 APPLY SRS HEADFRAME ADD-ON: CPT

## 2018-11-07 PROCEDURE — 77334 RADIATION TREATMENT AID(S): CPT | Mod: 26

## 2018-11-07 PROCEDURE — A9585: CPT

## 2018-11-07 PROCEDURE — 76498 UNLISTED MR PROCEDURE: CPT

## 2018-11-28 ENCOUNTER — APPOINTMENT (OUTPATIENT)
Dept: INTERVENTIONAL RADIOLOGY/VASCULAR | Facility: HOSPITAL | Age: 28
End: 2018-11-28

## 2018-12-10 ENCOUNTER — APPOINTMENT (OUTPATIENT)
Dept: NEUROSURGERY | Facility: CLINIC | Age: 28
End: 2018-12-10
Payer: COMMERCIAL

## 2018-12-10 VITALS
RESPIRATION RATE: 16 BRPM | SYSTOLIC BLOOD PRESSURE: 101 MMHG | DIASTOLIC BLOOD PRESSURE: 67 MMHG | BODY MASS INDEX: 19.07 KG/M2 | HEART RATE: 87 BPM | HEIGHT: 61 IN | WEIGHT: 101 LBS | OXYGEN SATURATION: 98 % | TEMPERATURE: 98.4 F

## 2018-12-10 DIAGNOSIS — Z92.3 PERSONAL HISTORY OF IRRADIATION: ICD-10-CM

## 2018-12-10 PROCEDURE — 99024 POSTOP FOLLOW-UP VISIT: CPT

## 2018-12-11 PROBLEM — Z92.3 STATUS POST GAMMA KNIFE TREATMENT: Status: ACTIVE | Noted: 2018-12-11

## 2018-12-11 RX ORDER — ONDANSETRON 8 MG/1
8 TABLET, ORALLY DISINTEGRATING ORAL EVERY 8 HOURS
Qty: 20 | Refills: 0 | Status: DISCONTINUED | COMMUNITY
Start: 2018-11-08 | End: 2018-12-11

## 2018-12-11 RX ORDER — DEXAMETHASONE 2 MG/1
2 TABLET ORAL TWICE DAILY
Qty: 60 | Refills: 0 | Status: DISCONTINUED | COMMUNITY
Start: 2018-10-30 | End: 2018-12-11

## 2018-12-11 RX ORDER — ONDANSETRON 8 MG/1
8 TABLET ORAL EVERY 8 HOURS
Qty: 21 | Refills: 0 | Status: DISCONTINUED | COMMUNITY
Start: 2018-11-08 | End: 2018-12-11

## 2018-12-13 ENCOUNTER — APPOINTMENT (OUTPATIENT)
Dept: RADIATION ONCOLOGY | Facility: CLINIC | Age: 28
End: 2018-12-13

## 2018-12-16 ENCOUNTER — EMERGENCY (EMERGENCY)
Facility: HOSPITAL | Age: 28
LOS: 1 days | Discharge: HOME | End: 2018-12-16
Attending: EMERGENCY MEDICINE | Admitting: EMERGENCY MEDICINE
Payer: COMMERCIAL

## 2018-12-16 VITALS
OXYGEN SATURATION: 99 % | SYSTOLIC BLOOD PRESSURE: 99 MMHG | HEART RATE: 65 BPM | TEMPERATURE: 98 F | RESPIRATION RATE: 16 BRPM | DIASTOLIC BLOOD PRESSURE: 63 MMHG

## 2018-12-16 VITALS
DIASTOLIC BLOOD PRESSURE: 71 MMHG | HEART RATE: 75 BPM | TEMPERATURE: 100 F | SYSTOLIC BLOOD PRESSURE: 117 MMHG | OXYGEN SATURATION: 98 % | WEIGHT: 101.41 LBS | HEIGHT: 64 IN | RESPIRATION RATE: 16 BRPM

## 2018-12-16 DIAGNOSIS — Z95.828 PRESENCE OF OTHER VASCULAR IMPLANTS AND GRAFTS: Chronic | ICD-10-CM

## 2018-12-16 DIAGNOSIS — Z98.890 OTHER SPECIFIED POSTPROCEDURAL STATES: Chronic | ICD-10-CM

## 2018-12-16 DIAGNOSIS — Z79.899 OTHER LONG TERM (CURRENT) DRUG THERAPY: ICD-10-CM

## 2018-12-16 DIAGNOSIS — J09.X2 INFLUENZA DUE TO IDENTIFIED NOVEL INFLUENZA A VIRUS WITH OTHER RESPIRATORY MANIFESTATIONS: ICD-10-CM

## 2018-12-16 DIAGNOSIS — R05 COUGH: ICD-10-CM

## 2018-12-16 LAB
ALBUMIN SERPL ELPH-MCNC: 4.4 G/DL — SIGNIFICANT CHANGE UP (ref 3.3–5)
ALP SERPL-CCNC: 48 U/L — SIGNIFICANT CHANGE UP (ref 40–120)
ALT FLD-CCNC: 10 U/L — SIGNIFICANT CHANGE UP (ref 10–45)
ANION GAP SERPL CALC-SCNC: 16 MMOL/L — SIGNIFICANT CHANGE UP (ref 5–17)
AST SERPL-CCNC: 16 U/L — SIGNIFICANT CHANGE UP (ref 10–40)
BASOPHILS NFR BLD AUTO: 0.2 % — SIGNIFICANT CHANGE UP (ref 0–2)
BILIRUB SERPL-MCNC: 0.7 MG/DL — SIGNIFICANT CHANGE UP (ref 0.2–1.2)
BUN SERPL-MCNC: 14 MG/DL — SIGNIFICANT CHANGE UP (ref 7–23)
CALCIUM SERPL-MCNC: 9.2 MG/DL — SIGNIFICANT CHANGE UP (ref 8.4–10.5)
CHLORIDE SERPL-SCNC: 100 MMOL/L — SIGNIFICANT CHANGE UP (ref 96–108)
CO2 SERPL-SCNC: 23 MMOL/L — SIGNIFICANT CHANGE UP (ref 22–31)
CREAT SERPL-MCNC: 0.55 MG/DL — SIGNIFICANT CHANGE UP (ref 0.5–1.3)
EOSINOPHIL NFR BLD AUTO: 0.5 % — SIGNIFICANT CHANGE UP (ref 0–6)
FLUAV H3 RNA SPEC QL NAA+PROBE: DETECTED
GLUCOSE SERPL-MCNC: 98 MG/DL — SIGNIFICANT CHANGE UP (ref 70–99)
HCT VFR BLD CALC: 33.3 % — LOW (ref 34.5–45)
HGB BLD-MCNC: 11.2 G/DL — LOW (ref 11.5–15.5)
LACTATE SERPL-SCNC: 0.7 MMOL/L — SIGNIFICANT CHANGE UP (ref 0.5–2)
LYMPHOCYTES # BLD AUTO: 12 % — LOW (ref 13–44)
MCHC RBC-ENTMCNC: 30.5 PG — SIGNIFICANT CHANGE UP (ref 27–34)
MCHC RBC-ENTMCNC: 33.6 G/DL — SIGNIFICANT CHANGE UP (ref 32–36)
MCV RBC AUTO: 90.7 FL — SIGNIFICANT CHANGE UP (ref 80–100)
MONOCYTES NFR BLD AUTO: 12 % — SIGNIFICANT CHANGE UP (ref 2–14)
NEUTROPHILS NFR BLD AUTO: 75.3 % — SIGNIFICANT CHANGE UP (ref 43–77)
PLATELET # BLD AUTO: 189 K/UL — SIGNIFICANT CHANGE UP (ref 150–400)
POTASSIUM SERPL-MCNC: 3.7 MMOL/L — SIGNIFICANT CHANGE UP (ref 3.5–5.3)
POTASSIUM SERPL-SCNC: 3.7 MMOL/L — SIGNIFICANT CHANGE UP (ref 3.5–5.3)
PROT SERPL-MCNC: 7.7 G/DL — SIGNIFICANT CHANGE UP (ref 6–8.3)
RAPID RVP RESULT: DETECTED
RBC # BLD: 3.67 M/UL — LOW (ref 3.8–5.2)
RBC # FLD: 12.4 % — SIGNIFICANT CHANGE UP (ref 10.3–16.9)
SODIUM SERPL-SCNC: 139 MMOL/L — SIGNIFICANT CHANGE UP (ref 135–145)
WBC # BLD: 5.6 K/UL — SIGNIFICANT CHANGE UP (ref 3.8–10.5)
WBC # FLD AUTO: 5.6 K/UL — SIGNIFICANT CHANGE UP (ref 3.8–10.5)

## 2018-12-16 PROCEDURE — 87633 RESP VIRUS 12-25 TARGETS: CPT

## 2018-12-16 PROCEDURE — 99284 EMERGENCY DEPT VISIT MOD MDM: CPT | Mod: 25

## 2018-12-16 PROCEDURE — 96374 THER/PROPH/DIAG INJ IV PUSH: CPT

## 2018-12-16 PROCEDURE — 87040 BLOOD CULTURE FOR BACTERIA: CPT

## 2018-12-16 PROCEDURE — 36415 COLL VENOUS BLD VENIPUNCTURE: CPT

## 2018-12-16 PROCEDURE — 83605 ASSAY OF LACTIC ACID: CPT

## 2018-12-16 PROCEDURE — 87581 M.PNEUMON DNA AMP PROBE: CPT

## 2018-12-16 PROCEDURE — 71046 X-RAY EXAM CHEST 2 VIEWS: CPT | Mod: 26

## 2018-12-16 PROCEDURE — 85025 COMPLETE CBC W/AUTO DIFF WBC: CPT

## 2018-12-16 PROCEDURE — 71046 X-RAY EXAM CHEST 2 VIEWS: CPT

## 2018-12-16 PROCEDURE — 87486 CHLMYD PNEUM DNA AMP PROBE: CPT

## 2018-12-16 PROCEDURE — 99284 EMERGENCY DEPT VISIT MOD MDM: CPT

## 2018-12-16 PROCEDURE — 87798 DETECT AGENT NOS DNA AMP: CPT

## 2018-12-16 PROCEDURE — 96375 TX/PRO/DX INJ NEW DRUG ADDON: CPT

## 2018-12-16 PROCEDURE — 80053 COMPREHEN METABOLIC PANEL: CPT

## 2018-12-16 RX ORDER — SODIUM CHLORIDE 9 MG/ML
1000 INJECTION INTRAMUSCULAR; INTRAVENOUS; SUBCUTANEOUS ONCE
Qty: 0 | Refills: 0 | Status: COMPLETED | OUTPATIENT
Start: 2018-12-16 | End: 2018-12-16

## 2018-12-16 RX ORDER — LAPATINIB 250 MG/1
4 TABLET ORAL
Qty: 0 | Refills: 0 | COMMUNITY

## 2018-12-16 RX ORDER — ONDANSETRON 8 MG/1
1 TABLET, FILM COATED ORAL
Qty: 9 | Refills: 0 | OUTPATIENT
Start: 2018-12-16 | End: 2018-12-18

## 2018-12-16 RX ORDER — ONDANSETRON 8 MG/1
4 TABLET, FILM COATED ORAL ONCE
Qty: 0 | Refills: 0 | Status: COMPLETED | OUTPATIENT
Start: 2018-12-16 | End: 2018-12-16

## 2018-12-16 RX ORDER — ACETAMINOPHEN 500 MG
650 TABLET ORAL ONCE
Qty: 0 | Refills: 0 | Status: COMPLETED | OUTPATIENT
Start: 2018-12-16 | End: 2018-12-16

## 2018-12-16 RX ORDER — HYDROXYZINE HCL 10 MG
0 TABLET ORAL
Qty: 0 | Refills: 0 | COMMUNITY

## 2018-12-16 RX ORDER — KETOROLAC TROMETHAMINE 30 MG/ML
30 SYRINGE (ML) INJECTION ONCE
Qty: 0 | Refills: 0 | Status: DISCONTINUED | OUTPATIENT
Start: 2018-12-16 | End: 2018-12-16

## 2018-12-16 RX ORDER — ESCITALOPRAM OXALATE 10 MG/1
0 TABLET, FILM COATED ORAL
Qty: 0 | Refills: 0 | COMMUNITY

## 2018-12-16 RX ORDER — ASPIRIN/CALCIUM CARB/MAGNESIUM 324 MG
1 TABLET ORAL
Qty: 0 | Refills: 0 | COMMUNITY

## 2018-12-16 RX ADMIN — Medication 30 MILLIGRAM(S): at 12:32

## 2018-12-16 RX ADMIN — Medication 75 MILLIGRAM(S): at 13:43

## 2018-12-16 RX ADMIN — SODIUM CHLORIDE 2000 MILLILITER(S): 9 INJECTION INTRAMUSCULAR; INTRAVENOUS; SUBCUTANEOUS at 12:40

## 2018-12-16 RX ADMIN — ONDANSETRON 4 MILLIGRAM(S): 8 TABLET, FILM COATED ORAL at 12:32

## 2018-12-16 RX ADMIN — Medication 650 MILLIGRAM(S): at 12:32

## 2018-12-16 NOTE — ED PROVIDER NOTE - NSFOLLOWUPINSTRUCTIONS_ED_ALL_ED_FT
Influenza, Adult  Influenza, more commonly known as “the flu,” is a viral infection that primarily affects the respiratory tract. The respiratory tract includes organs that help you breathe, such as the lungs, nose, and throat. The flu causes many common cold symptoms, as well as a high fever and body aches.    The flu spreads easily from person to person (is contagious). Getting a flu shot (influenza vaccination) every year is the best way to prevent influenza.    What are the causes?  Influenza is caused by a virus. You can catch the virus by:    Breathing in droplets from an infected person's cough or sneeze.  Touching something that was recently contaminated with the virus and then touching your mouth, nose, or eyes.    What increases the risk?  The following factors may make you more likely to get the flu:    Not cleaning your hands frequently with soap and water or alcohol-based hand .  Having close contact with many people during cold and flu season.  Touching your mouth, eyes, or nose without washing or sanitizing your hands first.  Not drinking enough fluids or not eating a healthy diet.  Not getting enough sleep or exercise.  Being under a high amount of stress.  Not getting a yearly (annual) flu shot.    You may be at a higher risk of complications from the flu, such as a severe lung infection (pneumonia), if you:    Are over the age of 65.  Are pregnant.  Have a weakened disease-fighting system (immune system). You may have a weakened immune system if you:    Have HIV or AIDS.  Are undergoing chemotherapy.  Are taking medicines that reduce the activity of (suppress) the immune system.    Have a long-term (chronic) illness, such as heart disease, kidney disease, diabetes, or lung disease.  Have a liver disorder.  Are obese.  Have anemia.    What are the signs or symptoms?  Symptoms of this condition typically last 4–10 days and may include:    Fever.  Chills.  Headache, body aches, or muscle aches.  Sore throat.  Cough.  Runny or congested nose.  Chest discomfort and cough.  Poor appetite.  Weakness or tiredness (fatigue).  Dizziness.  Nausea or vomiting.    How is this diagnosed?  This condition may be diagnosed based on your medical history and a physical exam. Your health care provider may do a nose or throat swab test to confirm the diagnosis.    How is this treated?  If influenza is detected early, you can be treated with antiviral medicine that can reduce the length of your illness and the severity of your symptoms. This medicine may be given by mouth (orally) or through an IV tube that is inserted in one of your veins.    The goal of treatment is to relieve symptoms by taking care of yourself at home. This may include taking over-the-counter medicines, drinking plenty of fluids, and adding humidity to the air in your home.    ImageIn some cases, influenza goes away on its own. Severe influenza or complications from influenza may be treated in a hospital.    Follow these instructions at home:  Take over-the-counter and prescription medicines only as told by your health care provider.  Use a cool mist humidifier to add humidity to the air in your home. This can make breathing easier.  Rest as needed.  Drink enough fluid to keep your urine clear or pale yellow.  Cover your mouth and nose when you cough or sneeze.  Wash your hands with soap and water often, especially after you cough or sneeze. If soap and water are not available, use hand .  Stay home from work or school as told by your health care provider. Unless you are visiting your health care provider, try to avoid leaving home until your fever has been gone for 24 hours without the use of medicine.  Keep all follow-up visits as told by your health care provider. This is important.  How is this prevented?  Getting an annual flu shot is the best way to avoid getting the flu. You may get the flu shot in late summer, fall, or winter. Ask your health care provider when you should get your flu shot.  Wash your hands often or use hand  often.  Avoid contact with people who are sick during cold and flu season.  Eat a healthy diet, drink plenty of fluids, get enough sleep, and exercise regularly.  Contact a health care provider if:  You develop new symptoms.  You have:    Chest pain.  Diarrhea.  A fever.    Your cough gets worse.  You produce more mucus.  You feel nauseous or you vomit.  Get help right away if:  You develop shortness of breath or difficulty breathing.  Your skin or nails turn a bluish color.  You have severe pain or stiffness in your neck.  You develop a sudden headache or sudden pain in your face or ear.  You cannot stop vomiting.

## 2018-12-16 NOTE — ED PROVIDER NOTE - PHYSICAL EXAMINATION
CONSTITUTIONAL: Well-appearing; well-nourished; in no apparent distress.   HEAD: Normocephalic; atraumatic.   EYES:  conjunctiva and sclera clear  ENT: normal nose; no rhinorrhea; normal pharynx with no erythema or lesions. dry MM  NECK: Supple; non-tender;   CARDIOVASCULAR: Normal S1, S2; no murmurs, rubs, or gallops. Regular rate and rhythm.   RESPIRATORY: Breathing easily; breath sounds clear and equal bilaterally; no wheezes, rhonchi, or rales.  GI: Soft; non-distended; non-tender; no palpable organomegaly.   EXT: No cyanosis or edema; N/V intact  SKIN: Normal for age and race; warm; dry; good turgor; no apparent lesions or rash.   NEURO: A & O x 3; face symmetric; grossly unremarkable.   PSYCHOLOGICAL: The patient’s mood and manner are appropriate.

## 2018-12-16 NOTE — ED PROVIDER NOTE - PSH
H/O left breast biopsy  lymph node  H/O mastectomy, bilateral  3/8/2017-with bilateral tissue expander placement and left arm lymph node dissection  S/P PICC central line placement  2/16/2018, right  Status post surgery  Vaccess CT power injectable (2016)

## 2018-12-16 NOTE — ED ADULT NURSE NOTE - PMH
Anxiety    Breast cancer  left, chemo& RT colpleted 12/2017  Depression    Migraine headache    Urinary tract infection  2016

## 2018-12-16 NOTE — ED PROVIDER NOTE - MEDICAL DECISION MAKING DETAILS
here w/  likely flu like illness vs pneumonia. will do septic workup, hydrate and give anti emetics, and reassess

## 2018-12-16 NOTE — ED PROVIDER NOTE - OBJECTIVE STATEMENT
28 year old female with h/o left breast carcinoma (2016) s/p neoadjuvant chemo then bilateral mastectomy s/p reconstruction, port in place, currently on oral chemo daily and IV chemo q 3 weeks (last on Wednesday), here w/ complaint of fevers, cough, diffuse body pain, and nausea x 1 day. not taking good po at home. WBC has dropped in the past req neulasta but none recently. Also w/ mild suprapubic pain but denies dysuria/urg/freq or hematuria. no hx of abdominal surgeries. denies rash.

## 2018-12-19 ENCOUNTER — APPOINTMENT (OUTPATIENT)
Dept: RADIATION ONCOLOGY | Facility: CLINIC | Age: 28
End: 2018-12-19

## 2019-01-24 ENCOUNTER — APPOINTMENT (OUTPATIENT)
Dept: MRI IMAGING | Facility: HOSPITAL | Age: 29
End: 2019-01-24
Payer: COMMERCIAL

## 2019-01-24 ENCOUNTER — OUTPATIENT (OUTPATIENT)
Dept: OUTPATIENT SERVICES | Facility: HOSPITAL | Age: 29
LOS: 1 days | End: 2019-01-24
Payer: COMMERCIAL

## 2019-01-24 DIAGNOSIS — Z98.890 OTHER SPECIFIED POSTPROCEDURAL STATES: Chronic | ICD-10-CM

## 2019-01-24 DIAGNOSIS — Z95.828 PRESENCE OF OTHER VASCULAR IMPLANTS AND GRAFTS: Chronic | ICD-10-CM

## 2019-01-24 PROCEDURE — 70553 MRI BRAIN STEM W/O & W/DYE: CPT

## 2019-01-24 PROCEDURE — A9585: CPT

## 2019-01-24 PROCEDURE — 70553 MRI BRAIN STEM W/O & W/DYE: CPT | Mod: 26

## 2019-01-30 NOTE — PROVIDER CONTACT NOTE (CRITICAL VALUE NOTIFICATION) - NS PROVIDER READ BACK TO LAB
yes
What Is The Reason For Today's Visit?: History of Melanoma
What Stage Is The Melanoma?: Stage IIA
Breslow Depth?: 1.08
Year Excised?: 2017

## 2019-03-08 LAB — GLUCOSE BLDC GLUCOMTR-MCNC: 86 MG/DL — SIGNIFICANT CHANGE UP (ref 70–99)

## 2019-03-12 ENCOUNTER — APPOINTMENT (OUTPATIENT)
Dept: PLASTIC SURGERY | Facility: CLINIC | Age: 29
End: 2019-03-12
Payer: COMMERCIAL

## 2019-03-12 ENCOUNTER — OUTPATIENT (OUTPATIENT)
Dept: OUTPATIENT SERVICES | Facility: HOSPITAL | Age: 29
LOS: 1 days | End: 2019-03-12
Payer: COMMERCIAL

## 2019-03-12 DIAGNOSIS — Z98.890 OTHER SPECIFIED POSTPROCEDURAL STATES: Chronic | ICD-10-CM

## 2019-03-12 DIAGNOSIS — Z95.828 PRESENCE OF OTHER VASCULAR IMPLANTS AND GRAFTS: Chronic | ICD-10-CM

## 2019-03-12 DIAGNOSIS — Z90.13 ACQUIRED ABSENCE OF BILATERAL BREASTS AND NIPPLES: ICD-10-CM

## 2019-03-12 DIAGNOSIS — N65.0 DEFORMITY OF RECONSTRUCTED BREAST: ICD-10-CM

## 2019-03-12 LAB — GLUCOSE BLDC GLUCOMTR-MCNC: 98 MG/DL — SIGNIFICANT CHANGE UP (ref 70–99)

## 2019-03-12 PROCEDURE — 78815 PET IMAGE W/CT SKULL-THIGH: CPT | Mod: 26

## 2019-03-12 PROCEDURE — 99213 OFFICE O/P EST LOW 20 MIN: CPT

## 2019-03-12 PROCEDURE — A9552: CPT

## 2019-03-12 PROCEDURE — 78815 PET IMAGE W/CT SKULL-THIGH: CPT

## 2019-03-12 PROCEDURE — 82962 GLUCOSE BLOOD TEST: CPT

## 2019-03-12 RX ORDER — LAPATINIB 250 MG/1
TABLET ORAL
Refills: 0 | Status: ACTIVE | COMMUNITY

## 2019-03-12 RX ORDER — TRASTUZUMAB 150 MG/7.4ML
INJECTION, POWDER, LYOPHILIZED, FOR SOLUTION INTRAVENOUS
Refills: 0 | Status: ACTIVE | COMMUNITY

## 2019-03-18 ENCOUNTER — CHART COPY (OUTPATIENT)
Age: 29
End: 2019-03-18

## 2019-03-18 PROBLEM — N65.0 BREAST RECONSTRUCTION DEFORMITY: Status: ACTIVE | Noted: 2019-03-18

## 2019-03-18 PROBLEM — Z90.13 ABSENCE OF BREAST, ACQUIRED, BILATERAL: Status: ACTIVE | Noted: 2018-01-18

## 2019-03-18 NOTE — HISTORY OF PRESENT ILLNESS
[FreeTextEntry1] : Pt is here to discuss revision s/p delayed BL breast reconstruction with IKER flap 3/14/18. She complains that the left breast is smaller than the right breast. She is interested in revision breast recon to correct the asymmetry and size. In October 2018 was dx with brain mets. Is taking Tykerb (1000mg) and is on Herceptin with Dr. Harrington and seeing Dr. Paez from radiation oncology. Completed targeted radiation to the brain in December 2018. She states Dr. Harrington cleared her for revision surgery but has a follow up with her on 3/19/19 and will confirm that she agrees she is cleared for revision. \par \par \par \par

## 2019-03-18 NOTE — SURGICAL HISTORY
[de-identified] : 3-6-17 bl mastectomy with  TE recon [de-identified] : 3-14-18 bl delayed IKER flap recon, removal of TE's

## 2019-03-18 NOTE — ASSESSMENT
[FreeTextEntry1] : 29 y/o F s/p B/L delayed IKER flap recon - needs left nipple recon and excision of the dog ears - also with significant asymmetry of the size of the breast mounds and ionterested in prosthetic implant behind the IKER flap to correct the asymmetry and make both sides larger. \par \par Because of her brain mets and the fact that she would need to hold her Tykerb to proceed with surgery \par follow up with Dr. Harrington to discuss whether we can proceed with revision. Would have to d/c Tykerb. \par

## 2019-03-18 NOTE — PHYSICAL EXAM
[de-identified] : Bilateral breast mounds are soft and well healed. the left is significant smaller and there is absence of the nipple with dog ear along the transverse scar. there is no sign of any infection or collection. The skin islands are pink and viable.  [de-identified] : No collections no sign of any infection. well healed. good contour

## 2019-03-19 ENCOUNTER — OUTPATIENT (OUTPATIENT)
Dept: OUTPATIENT SERVICES | Facility: HOSPITAL | Age: 29
LOS: 1 days | End: 2019-03-19
Payer: COMMERCIAL

## 2019-03-19 ENCOUNTER — APPOINTMENT (OUTPATIENT)
Dept: RADIATION ONCOLOGY | Facility: CLINIC | Age: 29
End: 2019-03-19
Payer: COMMERCIAL

## 2019-03-19 VITALS
TEMPERATURE: 98.4 F | BODY MASS INDEX: 19.07 KG/M2 | RESPIRATION RATE: 16 BRPM | OXYGEN SATURATION: 100 % | WEIGHT: 101 LBS | HEART RATE: 80 BPM | DIASTOLIC BLOOD PRESSURE: 68 MMHG | SYSTOLIC BLOOD PRESSURE: 119 MMHG | HEIGHT: 61 IN

## 2019-03-19 DIAGNOSIS — C50.912 MALIGNANT NEOPLASM OF UNSPECIFIED SITE OF LEFT FEMALE BREAST: ICD-10-CM

## 2019-03-19 DIAGNOSIS — Z98.890 OTHER SPECIFIED POSTPROCEDURAL STATES: Chronic | ICD-10-CM

## 2019-03-19 DIAGNOSIS — Z95.828 PRESENCE OF OTHER VASCULAR IMPLANTS AND GRAFTS: Chronic | ICD-10-CM

## 2019-03-19 DIAGNOSIS — C79.31 SECONDARY MALIGNANT NEOPLASM OF BRAIN: ICD-10-CM

## 2019-03-19 PROCEDURE — 99214 OFFICE O/P EST MOD 30 MIN: CPT

## 2019-03-19 PROCEDURE — A9560: CPT

## 2019-03-19 PROCEDURE — 78472 GATED HEART PLANAR SINGLE: CPT

## 2019-03-19 PROCEDURE — 78472 GATED HEART PLANAR SINGLE: CPT | Mod: 26

## 2019-03-19 NOTE — REASON FOR VISIT
[Brain Metastasis] : brain metastasis [Breast Cancer] : breast cancer [Routine Follow-Up] : routine follow-up visit for

## 2019-03-19 NOTE — REVIEW OF SYSTEMS
[Fatigue] : fatigue [SOB on Exertion] : shortness of breath during exertion [Joint Pain] : joint pain [Anxiety] : anxiety [Negative] : Heme/Lymph [Cognitive Disturbance: Grade 0] : Cognitive Disturbance: Grade 0 [Dizziness: Grade 0] : Dizziness: Grade 0  [Headache: Grade 0] : Headache: Grade 0 [Chills] : no chills [Fever] : no fever [Recent Change In Weight] : ~T no recent weight change [Night Sweats] : no night sweats [Skin Rash] : no skin rash [Dizziness] : no dizziness [Confused] : no confusion [Difficulty Walking] : no difficulty walking [Fainting] : no fainting [FreeTextEntry5] : right chest wall port [de-identified] : healed surgical incisions [de-identified] : as noted in HPI [FreeTextEntry8] : pregnant

## 2019-03-19 NOTE — DATA REVIEWED
[FreeTextEntry1] : I have personally reviewed all relevant imaging studies (MRI) and I have discussed the case with the referring physician Dr. Donna Harrington

## 2019-03-19 NOTE — PHYSICAL EXAM
[General Appearance - Alert] : alert [Thin] : thin [General Appearance - In No Acute Distress] : in no acute distress [Auscultation Breath Sounds / Voice Sounds] : lungs were clear to auscultation bilaterally [Heart Rate And Rhythm] : heart rate and rhythm were normal [Heart Sounds] : normal S1 and S2 [Bowel Sounds] : normal bowel sounds [Abdomen Soft] : soft [Cervical Lymph Nodes Enlarged Posterior Bilaterally] : posterior cervical [Cervical Lymph Nodes Enlarged Anterior Bilaterally] : anterior cervical [Supraclavicular Lymph Nodes Enlarged Bilaterally] : supraclavicular [Normal] : no joint swelling, no clubbing or cyanosis of the fingernails and muscle strength and tone were normal [Skin Color & Pigmentation] : normal skin color and pigmentation [] : no rash [No Focal Deficits] : no focal deficits [Oriented To Time, Place, And Person] : oriented to person, place, and time [de-identified] : healed surgical scars [de-identified] : healed surgical scars

## 2019-03-19 NOTE — VITALS
[Least Pain Intensity: 0/10] : 0/10 [NoTreatment Scheduled] : no treatment scheduled [90: Able to carry normal activity; minor signs or symptoms of disease.] : 90: Able to carry normal activity; minor signs or symptoms of disease.  [ECOG Performance Status: 1 - Restricted in physically strenuous activity but ambulatory and able to carry out work of a light or sedentary nature] : Performance Status: 1 - Restricted in physically strenuous activity but ambulatory and able to carry out work of a light or sedentary nature, e.g., light house work, office work [Maximal Pain Intensity: 0/10] : 0/10
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
## 2019-04-18 ENCOUNTER — FORM ENCOUNTER (OUTPATIENT)
Age: 29
End: 2019-04-18

## 2019-04-19 ENCOUNTER — APPOINTMENT (OUTPATIENT)
Dept: MRI IMAGING | Facility: HOSPITAL | Age: 29
End: 2019-04-19
Payer: COMMERCIAL

## 2019-04-19 ENCOUNTER — OUTPATIENT (OUTPATIENT)
Dept: OUTPATIENT SERVICES | Facility: HOSPITAL | Age: 29
LOS: 1 days | End: 2019-04-19
Payer: COMMERCIAL

## 2019-04-19 DIAGNOSIS — Z98.890 OTHER SPECIFIED POSTPROCEDURAL STATES: Chronic | ICD-10-CM

## 2019-04-19 DIAGNOSIS — Z95.828 PRESENCE OF OTHER VASCULAR IMPLANTS AND GRAFTS: Chronic | ICD-10-CM

## 2019-04-19 PROCEDURE — 70553 MRI BRAIN STEM W/O & W/DYE: CPT

## 2019-04-19 PROCEDURE — A9585: CPT

## 2019-04-19 PROCEDURE — 70553 MRI BRAIN STEM W/O & W/DYE: CPT | Mod: 26

## 2019-04-23 ENCOUNTER — RESULT REVIEW (OUTPATIENT)
Age: 29
End: 2019-04-23
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
## 2019-04-24 NOTE — PHYSICAL EXAM
[General Appearance - Alert] : alert [General Appearance - In No Acute Distress] : in no acute distress [Thin] : thin [Auscultation Breath Sounds / Voice Sounds] : lungs were clear to auscultation bilaterally [Heart Rate And Rhythm] : heart rate and rhythm were normal [Heart Sounds] : normal S1 and S2 [Bowel Sounds] : normal bowel sounds [Abdomen Soft] : soft [Cervical Lymph Nodes Enlarged Posterior Bilaterally] : posterior cervical [Normal] : no joint swelling, no clubbing or cyanosis of the fingernails and muscle strength and tone were normal [Cervical Lymph Nodes Enlarged Anterior Bilaterally] : anterior cervical [Supraclavicular Lymph Nodes Enlarged Bilaterally] : supraclavicular [] : no rash [Skin Color & Pigmentation] : normal skin color and pigmentation [No Focal Deficits] : no focal deficits [Oriented To Time, Place, And Person] : oriented to person, place, and time [de-identified] : healed surgical scars [de-identified] : healed surgical scars

## 2019-04-24 NOTE — REASON FOR VISIT
[Routine Follow-Up] : routine follow-up visit for [Brain Metastasis] : brain metastasis [Breast Cancer] : breast cancer

## 2019-04-24 NOTE — DISEASE MANAGEMENT
[Clinical] : TNM Stage: c [FreeTextEntry4] : Recurrent, solitary metastatic lesion to brain [NTNM] : - [MTNM] : - [TTNM] : - [IV] : IV 14-Jun-2018 14:38

## 2019-04-24 NOTE — REVIEW OF SYSTEMS
[Fever] : no fever [Night Sweats] : no night sweats [Chills] : no chills [Fatigue] : fatigue [Recent Change In Weight] : ~T no recent weight change [SOB on Exertion] : shortness of breath during exertion [Skin Rash] : no skin rash [Joint Pain] : joint pain [Dizziness] : no dizziness [Fainting] : no fainting [Confused] : no confusion [Anxiety] : anxiety [Difficulty Walking] : no difficulty walking [Negative] : Heme/Lymph [FreeTextEntry8] : pregnant [FreeTextEntry5] : right chest wall port [de-identified] : as noted in HPI [de-identified] : healed surgical incisions [Dizziness: Grade 0] : Dizziness: Grade 0  [Cognitive Disturbance: Grade 0] : Cognitive Disturbance: Grade 0 [Headache: Grade 0] : Headache: Grade 0

## 2019-04-29 ENCOUNTER — APPOINTMENT (OUTPATIENT)
Dept: RADIATION ONCOLOGY | Facility: CLINIC | Age: 29
End: 2019-04-29

## 2019-06-25 NOTE — ASU PATIENT PROFILE, ADULT - PROVIDER NOTIFICATION
Graft Donor Site Will Heal By Secondary Intention: No Number Of Stages Required To Clear Tumor (Optional): 1 Post-Care Instructions: I reviewed with the patient in detail post-care instructions. Patient is not to engage in any heavy lifting, exercise, or swimming for the next 7 days. Should the patient develop any fevers, chills, bleeding, severe pain patient will contact the office immediately. Tarsorrhaphy Text: A tarsorrhaphy was performed using Frost sutures. Bilobed Flap Text: The defect edges were debeveled with a #15 scalpel blade.  Given the location of the defect and the proximity to free margins a bilobe flap was deemed most appropriate.  Using a sterile surgical marker, an appropriate bilobe flap drawn around the defect.    The area thus outlined was incised deep to adipose tissue with a #15 scalpel blade.  The skin margins were undermined to an appropriate distance in all directions utilizing iris scissors. Closure 3 Information: This tab is for additional flaps and grafts above and beyond our usual structured repairs.  Please note if you enter information here it will not currently bill and you will need to add the billing information manually. W Plasty Text: The lesion was extirpated to the level of the fat with a #15 scalpel blade.  Given the location of the defect, shape of the defect and the proximity to free margins a W-plasty was deemed most appropriate for repair.  Using a sterile surgical marker, the appropriate transposition arms of the W-plasty were drawn incorporating the defect and placing the expected incisions within the relaxed skin tension lines where possible.    The area thus outlined was incised deep to adipose tissue with a #15 scalpel blade.  The skin margins were undermined to an appropriate distance in all directions utilizing iris scissors.  The opposing transposition arms were then transposed into place in opposite direction and anchored with interrupted buried subcutaneous sutures. Split-Thickness Skin Graft Text: The defect edges were debeveled with a #15 scalpel blade.  Given the location of the defect, shape of the defect and the proximity to free margins a split thickness skin graft was deemed most appropriate.  Using a sterile surgical marker, the primary defect shape was transferred to the donor site. The split thickness graft was then harvested.  The skin graft was then placed in the primary defect and oriented appropriately. Mercedes Flap Text: The defect edges were debeveled with a #15 scalpel blade.  Given the location of the defect, shape of the defect and the proximity to free margins a Mercedes flap was deemed most appropriate.  Using a sterile surgical marker, an appropriate advancement flap was drawn incorporating the defect and placing the expected incisions within the relaxed skin tension lines where possible. The area thus outlined was incised deep to adipose tissue with a #15 scalpel blade.  The skin margins were undermined to an appropriate distance in all directions utilizing iris scissors. No Repair - Repaired With Adjacent Surgical Defect Text (Leave Blank If You Do Not Want): After obtaining clear surgical margins the defect was repaired concurrently with another surgical defect which was in close approximation. Complex Repair And Flap Additional Text (Will Appearing After The Standard Complex Repair Text): The complex repair was not sufficient to completely close the primary defect. The remaining additional defect was repaired with the flap mentioned below. Z Plasty Text: The lesion was extirpated to the level of the fat with a #15 scalpel blade.  Given the location of the defect, shape of the defect and the proximity to free margins a Z-plasty was deemed most appropriate for repair.  Using a sterile surgical marker, the appropriate transposition arms of the Z-plasty were drawn incorporating the defect and placing the expected incisions within the relaxed skin tension lines where possible.    The area thus outlined was incised deep to adipose tissue with a #15 scalpel blade.  The skin margins were undermined to an appropriate distance in all directions utilizing iris scissors.  The opposing transposition arms were then transposed into place in opposite direction and anchored with interrupted buried subcutaneous sutures. Cheek Interpolation Flap Division And Inset Text: Division and inset of the cheek interpolation flap was performed to achieve optimal aesthetic result, restore normal anatomic appearance and avoid distortion of normal anatomy, expedite and facilitate wound healing, achieve optimal functional result and because linear closure either not possible or would produce suboptimal result. The patient was prepped and draped in the usual manner. The pedicle was infiltrated with local anesthesia. The pedicle was sectioned with a #15 blade. The pedicle was de-bulked and trimmed to match the shape of the defect. Hemostasis was achieved. The flap donor site and free margin of the flap were secured with deep buried sutures and the wound edges were re-approximated. Referred To Otolaryngology For Closure Text (Leave Blank If You Do Not Want): After obtaining clear surgical margins the patient was sent to otolaryngology for surgical repair.  The patient understands they will receive post-surgical care and follow-up from the referring physician's office. Declines Referred To Oculoplastics For Closure Text (Leave Blank If You Do Not Want): After obtaining clear surgical margins the patient was sent to oculoplastics for surgical repair.  The patient understands they will receive post-surgical care and follow-up from the referring physician's office. Bilobed Transposition Flap Text: The defect edges were debeveled with a #15 scalpel blade.  Given the location of the defect and the proximity to free margins a bilobed transposition flap was deemed most appropriate.  Using a sterile surgical marker, an appropriate bilobe flap drawn around the defect.    The area thus outlined was incised deep to adipose tissue with a #15 scalpel blade.  The skin margins were undermined to an appropriate distance in all directions utilizing iris scissors. Cartilage Graft Text: The defect edges were debeveled with a #15 scalpel blade.  Given the location of the defect, shape of the defect, the fact the defect involved a full thickness cartilage defect a cartilage graft was deemed most appropriate.  An appropriate donor site was identified, cleansed, and anesthetized. The cartilage graft was then harvested and transferred to the recipient site, oriented appropriately and then sutured into place.  The secondary defect was then repaired using a primary closure. Cheek Interpolation Flap Text: A decision was made to reconstruct the defect utilizing an interpolation axial flap and a staged reconstruction.  A telfa template was made of the defect.  This telfa template was then used to outline the Cheek Interpolation flap.  The donor area for the pedicle flap was then injected with anesthesia.  The flap was excised through the skin and subcutaneous tissue down to the layer of the underlying musculature.  The interpolation flap was carefully excised within this deep plane to maintain its blood supply.  The edges of the donor site were undermined.   The donor site was closed in a primary fashion.  The pedicle was then rotated into position and sutured.  Once the tube was sutured into place, adequate blood supply was confirmed with blanching and refill.  The pedicle was then wrapped with xeroform gauze and dressed appropriately with a telfa and gauze bandage to ensure continued blood supply and protect the attached pedicle. Cheek To Nose Interpolation Flap Division And Inset Text: Division and inset of the cheek to nose interpolation flap was performed to achieve optimal aesthetic result, restore normal anatomic appearance and avoid distortion of normal anatomy, expedite and facilitate wound healing, achieve optimal functional result and because linear closure either not possible or would produce suboptimal result. The patient was prepped and draped in the usual manner. The pedicle was infiltrated with local anesthesia. The pedicle was sectioned with a #15 blade. The pedicle was de-bulked and trimmed to match the shape of the defect. Hemostasis was achieved. The flap donor site and free margin of the flap were secured with deep buried sutures and the wound edges were re-approximated. Epidermal Autograft Text: The defect edges were debeveled with a #15 scalpel blade.  Given the location of the defect, shape of the defect and the proximity to free margins an epidermal autograft was deemed most appropriate.  Using a sterile surgical marker, the primary defect shape was transferred to the donor site. The epidermal graft was then harvested.  The skin graft was then placed in the primary defect and oriented appropriately. Mucosal Advancement Flap Text: Given the location of the defect, shape of the defect and the proximity to free margins a mucosal advancement flap was deemed most appropriate. Incisions were made with a 15 blade scalpel in the appropriate fashion along the cutaneous vermillion border and the mucosal lip. The remaining actinically damaged mucosal tissue was excised.  The mucosal advancement flap was then elevated to the gingival sulcus with care taken to preserve the neurovascular structures and advanced into the primary defect. Care was taken to ensure that precise realignment of the vermillion border was achieved. Referred To Plastics For Closure Text (Leave Blank If You Do Not Want): After obtaining clear surgical margins the patient was sent to plastics for surgical repair.  The patient understands they will receive post-surgical care and follow-up from the referring physician's office. Skin Substitute: EpiFix Micronized Modified Advancement Flap Text: The defect edges were debeveled with a #15 scalpel blade.  Given the location of the defect, shape of the defect and the proximity to free margins a modified advancement flap was deemed most appropriate.  Using a sterile surgical marker, an appropriate advancement flap was drawn incorporating the defect and placing the expected incisions within the relaxed skin tension lines where possible.    The area thus outlined was incised deep to adipose tissue with a #15 scalpel blade.  The skin margins were undermined to an appropriate distance in all directions utilizing iris scissors. Suture Removal: 7 days Composite Graft Text: The defect edges were debeveled with a #15 scalpel blade.  Given the location of the defect, shape of the defect, the proximity to free margins and the fact the defect was full thickness a composite graft was deemed most appropriate.  The defect was outline and then transferred to the donor site.  A full thickness graft was then excised from the donor site. The graft was then placed in the primary defect, oriented appropriately and then sutured into place.  The secondary defect was then repaired using a primary closure. Trilobed Flap Text: The defect edges were debeveled with a #15 scalpel blade.  Given the location of the defect and the proximity to free margins a trilobed flap was deemed most appropriate.  Using a sterile surgical marker, an appropriate trilobed flap drawn around the defect.    The area thus outlined was incised deep to adipose tissue with a #15 scalpel blade.  The skin margins were undermined to an appropriate distance in all directions utilizing iris scissors. Hatchet Flap Text: The defect edges were debeveled with a #15 scalpel blade.  Given the location of the defect, shape of the defect and the proximity to free margins a hatchet flap was deemed most appropriate.  Using a sterile surgical marker, an appropriate hatchet flap was drawn incorporating the defect and placing the expected incisions within the relaxed skin tension lines where possible.    The area thus outlined was incised deep to adipose tissue with a #15 scalpel blade.  The skin margins were undermined to an appropriate distance in all directions utilizing iris scissors. Complex Repair And Graft Additional Text (Will Appearing After The Standard Complex Repair Text): The complex repair was not sufficient to completely close the primary defect. The remaining additional defect was repaired with the graft mentioned below. Island Pedicle Flap Text: The defect edges were debeveled with a #15 scalpel blade.  Given the location of the defect, shape of the defect and the proximity to free margins an island pedicle advancement flap was deemed most appropriate.  Using a sterile surgical marker, an appropriate advancement flap was drawn incorporating the defect, outlining the appropriate donor tissue and placing the expected incisions within the relaxed skin tension lines where possible.    The area thus outlined was incised deep to adipose tissue with a #15 scalpel blade.  The skin margins were undermined to an appropriate distance in all directions around the primary defect and laterally outward around the island pedicle utilizing iris scissors.  There was minimal undermining beneath the pedicle flap. Repair Type: Flap Referred To Asc For Closure Text (Leave Blank If You Do Not Want): After obtaining clear surgical margins the patient was sent to an ASC for surgical repair.  The patient understands they will receive post-surgical care and follow-up from the ASC physician. Cheek-To-Nose Interpolation Flap Text: A decision was made to reconstruct the defect utilizing an interpolation axial flap and a staged reconstruction.  A telfa template was made of the defect.  This telfa template was then used to outline the Cheek-To-Nose Interpolation flap.  The donor area for the pedicle flap was then injected with anesthesia.  The flap was excised through the skin and subcutaneous tissue down to the layer of the underlying musculature.  The interpolation flap was carefully excised within this deep plane to maintain its blood supply.  The edges of the donor site were undermined.   The donor site was closed in a primary fashion.  The pedicle was then rotated into position and sutured.  Once the tube was sutured into place, adequate blood supply was confirmed with blanching and refill.  The pedicle was then wrapped with xeroform gauze and dressed appropriately with a telfa and gauze bandage to ensure continued blood supply and protect the attached pedicle. Detail Level: Detailed Anesthesia Volume In Cc: 2.5 Dorsal Nasal Flap Text: The defect edges were debeveled with a #15 scalpel blade.  Given the location of the defect and the proximity to free margins a dorsal nasal flap was deemed most appropriate.  Using a sterile surgical marker, an appropriate dorsal nasal flap was drawn around the defect.    The area thus outlined was incised deep to adipose tissue with a #15 scalpel blade.  The skin margins were undermined to an appropriate distance in all directions utilizing iris scissors. Closure 4 Information: This tab is for additional flaps and grafts above and beyond our usual structured repairs.  Please note if you enter information here it will not currently bill and you will need to add the billing information manually. Skin Substitute Units (Required For Skin Substitute Paste Or Injection): 0 Rotation Flap Text: The defect edges were debeveled with a #15 scalpel blade.  Given the location of the defect, shape of the defect and the proximity to free margins a rotation flap was deemed most appropriate.  Using a sterile surgical marker, an appropriate rotation flap was drawn incorporating the defect and placing the expected incisions within the relaxed skin tension lines where possible.    The area thus outlined was incised deep to adipose tissue with a #15 scalpel blade.  The skin margins were undermined to an appropriate distance in all directions utilizing iris scissors. Dermal Autograft Text: The defect edges were debeveled with a #15 scalpel blade.  Given the location of the defect, shape of the defect and the proximity to free margins a dermal autograft was deemed most appropriate.  Using a sterile surgical marker, the primary defect shape was transferred to the donor site. The area thus outlined was incised deep to adipose tissue with a #15 scalpel blade.  The harvested graft was then trimmed of adipose and epidermal tissue until only dermis was left.  The skin graft was then placed in the primary defect and oriented appropriately. Epidermal Closure: running Island Pedicle Flap With Canthal Suspension Text: The defect edges were debeveled with a #15 scalpel blade.  Given the location of the defect, shape of the defect and the proximity to free margins an island pedicle advancement flap was deemed most appropriate.  Using a sterile surgical marker, an appropriate advancement flap was drawn incorporating the defect, outlining the appropriate donor tissue and placing the expected incisions within the relaxed skin tension lines where possible. The area thus outlined was incised deep to adipose tissue with a #15 scalpel blade.  The skin margins were undermined to an appropriate distance in all directions around the primary defect and laterally outward around the island pedicle utilizing iris scissors.  There was minimal undermining beneath the pedicle flap. A suspension suture was placed in the canthal tendon to prevent tension and prevent ectropion. Interpolation Flap Text: A decision was made to reconstruct the defect utilizing an interpolation axial flap and a staged reconstruction.  A telfa template was made of the defect.  This telfa template was then used to outline the interpolation flap.  The donor area for the pedicle flap was then injected with anesthesia.  The flap was excised through the skin and subcutaneous tissue down to the layer of the underlying musculature.  The interpolation flap was carefully excised within this deep plane to maintain its blood supply.  The edges of the donor site were undermined.   The donor site was closed in a primary fashion.  The pedicle was then rotated into position and sutured.  Once the tube was sutured into place, adequate blood supply was confirmed with blanching and refill.  The pedicle was then wrapped with xeroform gauze and dressed appropriately with a telfa and gauze bandage to ensure continued blood supply and protect the attached pedicle. Melolabial Interpolation Flap Division And Inset Text: Division and inset of the melolabial interpolation flap was performed to achieve optimal aesthetic result, restore normal anatomic appearance and avoid distortion of normal anatomy, expedite and facilitate wound healing, achieve optimal functional result and because linear closure either not possible or would produce suboptimal result. The patient was prepped and draped in the usual manner. The pedicle was infiltrated with local anesthesia. The pedicle was sectioned with a #15 blade. The pedicle was de-bulked and trimmed to match the shape of the defect. Hemostasis was achieved. The flap donor site and free margin of the flap were secured with deep buried sutures and the wound edges were re-approximated. Referred To Mid-Level For Closure Text (Leave Blank If You Do Not Want): After obtaining clear surgical margins the patient was sent to a mid-level provider for surgical repair.  The patient understands they will receive post-surgical care and follow-up from the mid-level provider. Intermediate Repair Preamble Text (Leave Blank If You Do Not Want): Undermining was performed with blunt dissection. Alar Island Pedicle Flap Text: The defect edges were debeveled with a #15 scalpel blade.  Given the location of the defect, shape of the defect and the proximity to the alar rim an island pedicle advancement flap was deemed most appropriate.  Using a sterile surgical marker, an appropriate advancement flap was drawn incorporating the defect, outlining the appropriate donor tissue and placing the expected incisions within the nasal ala running parallel to the alar rim. The area thus outlined was incised with a #15 scalpel blade.  The skin margins were undermined minimally to an appropriate distance in all directions around the primary defect and laterally outward around the island pedicle utilizing iris scissors.  There was minimal undermining beneath the pedicle flap. Complex Repair Preamble Text (Leave Blank If You Do Not Want): The defect edges were debeveled with a #15 scalpel blade. Given the location of the defect a complex repair was deemed most appropriate.  Using a sterile surgical marker, burrow triangles were drawn incorporating the defect and placing the expected incisions within the relaxed skin tension lines where possible.    The area thus outlined was incised to the appropriate tissue plane with a scalpel blade. Extensive wide undermining was performed in all directions to allow proper closure of the defect.  Hemostasis was obtained by cautery. Skin Substitute Text: The defect edges were debeveled with a #15 scalpel blade.  Given the location of the defect, shape of the defect and the proximity to free margins a skin substitute graft was deemed most appropriate.  The graft material was trimmed to fit the size of the defect. The graft was then placed in the primary defect and oriented appropriately. Show Asc Variables: Yes Spiral Flap Text: The defect edges were debeveled with a #15 scalpel blade.  Given the location of the defect, shape of the defect and the proximity to free margins a spiral flap was deemed most appropriate.  Using a sterile surgical marker, an appropriate rotation flap was drawn incorporating the defect and placing the expected incisions within the relaxed skin tension lines where possible. The area thus outlined was incised deep to adipose tissue with a #15 scalpel blade.  The skin margins were undermined to an appropriate distance in all directions utilizing iris scissors. Mastoid Interpolation Flap Division And Inset Text: Division and inset of the mastoid interpolation flap was performed to achieve optimal aesthetic result, restore normal anatomic appearance and avoid distortion of normal anatomy, expedite and facilitate wound healing, achieve optimal functional result and because linear closure either not possible or would produce suboptimal result. The patient was prepped and draped in the usual manner. The pedicle was infiltrated with local anesthesia. The pedicle was sectioned with a #15 blade. The pedicle was de-bulked and trimmed to match the shape of the defect. Hemostasis was achieved. The flap donor site and free margin of the flap were secured with deep buried sutures and the wound edges were re-approximated. Repair Performed By Another Provider Text (Leave Blank If You Do Not Want): After obtaining clear surgical margins the defect was repaired by another provider. Mastoid Interpolation Flap Text: A decision was made to reconstruct the defect utilizing an interpolation axial flap and a staged reconstruction.  A telfa template was made of the defect.  This telfa template was then used to outline the mastoid interpolation flap.  The donor area for the pedicle flap was then injected with anesthesia.  The flap was excised through the skin and subcutaneous tissue down to the layer of the underlying musculature.  The pedicle flap was carefully excised within this deep plane to maintain its blood supply.  The edges of the donor site were undermined.   The donor site was closed in a primary fashion.  The pedicle was then rotated into position and sutured.  Once the tube was sutured into place, adequate blood supply was confirmed with blanching and refill.  The pedicle was then wrapped with xeroform gauze and dressed appropriately with a telfa and gauze bandage to ensure continued blood supply and protect the attached pedicle. Paramedian Forehead Flap Division And Inset Text: Division and inset of the paramedian forehead flap was performed to achieve optimal aesthetic result, restore normal anatomic appearance and avoid distortion of normal anatomy, expedite and facilitate wound healing, achieve optimal functional result and because linear closure either not possible or would produce suboptimal result. The patient was prepped and draped in the usual manner. The pedicle was infiltrated with local anesthesia. The pedicle was sectioned with a #15 blade. The pedicle was de-bulked and trimmed to match the shape of the defect. Hemostasis was achieved. The flap donor site and free margin of the flap were secured with deep buried sutures and the wound edges were re-approximated. Advancement Flap (Double) Text: The defect edges were debeveled with a #15 scalpel blade.  Given the location of the defect and the proximity to free margins a double advancement flap was deemed most appropriate.  Using a sterile surgical marker, the appropriate advancement flaps were drawn incorporating the defect and placing the expected incisions within the relaxed skin tension lines where possible.    The area thus outlined was incised deep to adipose tissue with a #15 scalpel blade.  The skin margins were undermined to an appropriate distance in all directions utilizing iris scissors. Crescentic Complex Repair Preamble Text (Leave Blank If You Do Not Want): Extensive wide undermining was performed. Double Island Pedicle Flap Text: The defect edges were debeveled with a #15 scalpel blade.  Given the location of the defect, shape of the defect and the proximity to free margins a double island pedicle advancement flap was deemed most appropriate.  Using a sterile surgical marker, an appropriate advancement flap was drawn incorporating the defect, outlining the appropriate donor tissue and placing the expected incisions within the relaxed skin tension lines where possible.    The area thus outlined was incised deep to adipose tissue with a #15 scalpel blade.  The skin margins were undermined to an appropriate distance in all directions around the primary defect and laterally outward around the island pedicle utilizing iris scissors.  There was minimal undermining beneath the pedicle flap. Advancement Flap (Single) Text: The defect edges were debeveled with a #15 scalpel blade.  Given the location of the defect and the proximity to free margins a single advancement flap was deemed most appropriate.  Using a sterile surgical marker, an appropriate advancement flap was drawn incorporating the defect and placing the expected incisions within the relaxed skin tension lines where possible.    The area thus outlined was incised deep to adipose tissue with a #15 scalpel blade.  The skin margins were undermined to an appropriate distance in all directions utilizing iris scissors. Melolabial Interpolation Flap Text: A decision was made to reconstruct the defect utilizing an interpolation axial flap and a staged reconstruction.  A telfa template was made of the defect.  This telfa template was then used to outline the melolabial interpolation flap.  The donor area for the pedicle flap was then injected with anesthesia.  The flap was excised through the skin and subcutaneous tissue down to the layer of the underlying musculature.  The pedicle flap was carefully excised within this deep plane to maintain its blood supply.  The edges of the donor site were undermined.   The donor site was closed in a primary fashion.  The pedicle was then rotated into position and sutured.  Once the tube was sutured into place, adequate blood supply was confirmed with blanching and refill.  The pedicle was then wrapped with xeroform gauze and dressed appropriately with a telfa and gauze bandage to ensure continued blood supply and protect the attached pedicle. Star Wedge Flap Text: The defect edges were debeveled with a #15 scalpel blade.  Given the location of the defect, shape of the defect and the proximity to free margins a star wedge flap was deemed most appropriate.  Using a sterile surgical marker, an appropriate rotation flap was drawn incorporating the defect and placing the expected incisions within the relaxed skin tension lines where possible. The area thus outlined was incised deep to adipose tissue with a #15 scalpel blade.  The skin margins were undermined to an appropriate distance in all directions utilizing iris scissors. Wound Care: Vaseline Skin Substitute Injection Text: The defect edges were debeveled with a #15 scalpel blade.  Given the location of the defect, shape of the defect and the proximity to free margins a skin substitute micronized graft was deemed most appropriate.  The entire vial contents were admixed with 3.0ccs of sterile saline and then injected subcutaneously throughout the entire wound bed. Muscle Hinge Flap Text: The defect edges were debeveled with a #15 scalpel blade.  Given the size, depth and location of the defect and the proximity to free margins a muscle hinge flap was deemed most appropriate.  Using a sterile surgical marker, an appropriate hinge flap was drawn incorporating the defect. The area thus outlined was incised with a #15 scalpel blade.  The skin margins were undermined to an appropriate distance in all directions utilizing iris scissors. Closure 2 Information: This tab is for additional flaps and grafts, including complex repair and grafts and complex repair and flaps. You can also specify a different location for the additional defect, if the location is the same you do not need to select a new one. We will insert the automated text for the repair you select below just as we do for solitary flaps and grafts. Please note that at this time if you select a location with a different insurance zone you will need to override the ICD10 and CPT if appropriate. Burow's Advancement Flap Text: The defect edges were debeveled with a #15 scalpel blade.  Given the location of the defect and the proximity to free margins a Burow's advancement flap was deemed most appropriate.  Using a sterile surgical marker, the appropriate advancement flap was drawn incorporating the defect and placing the expected incisions within the relaxed skin tension lines where possible.    The area thus outlined was incised deep to adipose tissue with a #15 scalpel blade.  The skin margins were undermined to an appropriate distance in all directions utilizing iris scissors. Skin Substitute Paste Text: The defect edges were debeveled with a #15 scalpel blade.  Given the location of the defect, shape of the defect and the proximity to free margins a skin substitute micronized graft was deemed most appropriate.  The entire vial contents were admixed with 0.5ccs of sterile saline, formed into a paste and then evenly spread over the entire wound bed. Transposition Flap Text: The defect edges were debeveled with a #15 scalpel blade.  Given the location of the defect and the proximity to free margins a transposition flap was deemed most appropriate.  Using a sterile surgical marker, an appropriate transposition flap was drawn incorporating the defect.    The area thus outlined was incised deep to adipose tissue with a #15 scalpel blade.  The skin margins were undermined to an appropriate distance in all directions utilizing iris scissors. Island Pedicle Flap-Requiring Vessel Identification Text: The defect edges were debeveled with a #15 scalpel blade.  Given the location of the defect, shape of the defect and the proximity to free margins an island pedicle advancement flap was deemed most appropriate.  Using a sterile surgical marker, an appropriate advancement flap was drawn, based on the axial vessel mentioned above, incorporating the defect, outlining the appropriate donor tissue and placing the expected incisions within the relaxed skin tension lines where possible.    The area thus outlined was incised deep to adipose tissue with a #15 scalpel blade.  The skin margins were undermined to an appropriate distance in all directions around the primary defect and laterally outward around the island pedicle utilizing iris scissors.  There was minimal undermining beneath the pedicle flap. Keystone Flap Text: The defect edges were debeveled with a #15 scalpel blade.  Given the location of the defect, shape of the defect a keystone flap was deemed most appropriate.  Using a sterile surgical marker, an appropriate keystone flap was drawn incorporating the defect, outlining the appropriate donor tissue and placing the expected incisions within the relaxed skin tension lines where possible. The area thus outlined was incised deep to adipose tissue with a #15 scalpel blade.  The skin margins were undermined to an appropriate distance in all directions around the primary defect and laterally outward around the flap utilizing iris scissors. Chonodrocutaneous Helical Advancement Flap Text: The defect edges were debeveled with a #15 scalpel blade.  Given the location of the defect and the proximity to free margins a chondrocutaneous helical advancement flap was deemed most appropriate.  Using a sterile surgical marker, the appropriate advancement flap was drawn incorporating the defect and placing the expected incisions within the relaxed skin tension lines where possible.    The area thus outlined was incised deep to adipose tissue with a #15 scalpel blade.  The skin margins were undermined to an appropriate distance in all directions utilizing iris scissors. Information: Selecting Yes will display possible errors in your note based on the variables you have selected. This validation is only offered as a suggestion for you. PLEASE NOTE THAT THE VALIDATION TEXT WILL BE REMOVED WHEN YOU FINALIZE YOUR NOTE. IF YOU WANT TO FAX A PRELIMINARY NOTE YOU WILL NEED TO TOGGLE THIS TO 'NO' IF YOU DO NOT WANT IT IN YOUR FAXED NOTE. Posterior Auricular Interpolation Flap Division And Inset Text: Division and inset of the posterior auricular interpolation flap was performed to achieve optimal aesthetic result, restore normal anatomic appearance and avoid distortion of normal anatomy, expedite and facilitate wound healing, achieve optimal functional result and because linear closure either not possible or would produce suboptimal result. The patient was prepped and draped in the usual manner. The pedicle was infiltrated with local anesthesia. The pedicle was sectioned with a #15 blade. The pedicle was de-bulked and trimmed to match the shape of the defect. Hemostasis was achieved. The flap donor site and free margin of the flap were secured with deep buried sutures and the wound edges were re-approximated. Melolabial Transposition Flap Text: The defect edges were debeveled with a #15 scalpel blade.  Given the location of the defect and the proximity to free margins a melolabial flap was deemed most appropriate.  Using a sterile surgical marker, an appropriate melolabial transposition flap was drawn incorporating the defect.    The area thus outlined was incised deep to adipose tissue with a #15 scalpel blade.  The skin margins were undermined to an appropriate distance in all directions utilizing iris scissors. Tissue Cultured Epidermal Autograft Text: The defect edges were debeveled with a #15 scalpel blade.  Given the location of the defect, shape of the defect and the proximity to free margins a tissue cultured epidermal autograft was deemed most appropriate.  The graft was then trimmed to fit the size of the defect.  The graft was then placed in the primary defect and oriented appropriately. Flap Type: Mucosal Advancement Flap Posterior Auricular Interpolation Flap Text: A decision was made to reconstruct the defect utilizing an interpolation axial flap and a staged reconstruction.  A telfa template was made of the defect.  This telfa template was then used to outline the posterior auricular interpolation flap.  The donor area for the pedicle flap was then injected with anesthesia.  The flap was excised through the skin and subcutaneous tissue down to the layer of the underlying musculature.  The pedicle flap was carefully excised within this deep plane to maintain its blood supply.  The edges of the donor site were undermined.   The donor site was closed in a primary fashion.  The pedicle was then rotated into position and sutured.  Once the tube was sutured into place, adequate blood supply was confirmed with blanching and refill.  The pedicle was then wrapped with xeroform gauze and dressed appropriately with a telfa and gauze bandage to ensure continued blood supply and protect the attached pedicle. Paramedian Forehead Flap Text: A decision was made to reconstruct the defect utilizing an interpolation axial flap and a staged reconstruction.  A telfa template was made of the defect.  This telfa template was then used to outline the paramedian forehead pedicle flap.  The donor area for the pedicle flap was then injected with anesthesia.  The flap was excised through the skin and subcutaneous tissue down to the layer of the underlying musculature.  The pedicle flap was carefully excised within this deep plane to maintain its blood supply.  The edges of the donor site were undermined.   The donor site was closed in a primary fashion.  The pedicle was then rotated into position and sutured.  Once the tube was sutured into place, adequate blood supply was confirmed with blanching and refill.  The pedicle was then wrapped with xeroform gauze and dressed appropriately with a telfa and gauze bandage to ensure continued blood supply and protect the attached pedicle. Rhombic Flap Text: The defect edges were debeveled with a #15 scalpel blade.  Given the location of the defect and the proximity to free margins a rhombic flap was deemed most appropriate.  Using a sterile surgical marker, an appropriate rhombic flap was drawn incorporating the defect.    The area thus outlined was incised deep to adipose tissue with a #15 scalpel blade.  The skin margins were undermined to an appropriate distance in all directions utilizing iris scissors. O-T Plasty Text: The defect edges were debeveled with a #15 scalpel blade.  Given the location of the defect, shape of the defect and the proximity to free margins an O-T plasty was deemed most appropriate.  Using a sterile surgical marker, an appropriate O-T plasty was drawn incorporating the defect and placing the expected incisions within the relaxed skin tension lines where possible.    The area thus outlined was incised deep to adipose tissue with a #15 scalpel blade.  The skin margins were undermined to an appropriate distance in all directions utilizing iris scissors. Dressing: pressure dressing with telfa Manual Repair Warning Statement: We plan on removing the manually selected variable below in favor of our much easier automatic structured text blocks found in the previous tab. We decided to do this to help make the flow better and give you the full power of structured data. Manual selection is never going to be ideal in our platform and I would encourage you to avoid using manual selection from this point on, especially since I will be sunsetting this feature. It is important that you do one of two things with the customized text below. First, you can save all of the text in a word file so you can have it for future reference. Second, transfer the text to the appropriate area in the Library tab. Lastly, if there is a flap or graft type which we do not have you need to let us know right away so I can add it in before the variable is hidden. No need to panic, we plan to give you roughly 6 months to make the change. Crescentic Advancement Flap Text: The defect edges were debeveled with a #15 scalpel blade.  Given the location of the defect and the proximity to free margins a crescentic advancement flap was deemed most appropriate.  Using a sterile surgical marker, the appropriate advancement flap was drawn incorporating the defect and placing the expected incisions within the relaxed skin tension lines where possible.    The area thus outlined was incised deep to adipose tissue with a #15 scalpel blade.  The skin margins were undermined to an appropriate distance in all directions utilizing iris scissors. Anesthesia Type: 1% lidocaine with 1:100,000 epinephrine and a 1:10 solution of 8.4% sodium bicarbonate Type Of Previous Surgery (Optional- Ie Mohs Surgery): Mohs Cheiloplasty (Less Than 50%) Text: A decision was made to reconstruct the defect with a  cheiloplasty.  The defect was undermined extensively.  Additional obicularis oris muscle was excised with a 15 blade scalpel.  The defect was converted into a full thickness wedge, of less than 50% of the vertical height of the lip, to facilite a better cosmetic result.  Small vessels were then tied off with 5-0 monocyrl. The obicularis oris, superficial fascia, adipose and dermis were then reapproximated.  After the deeper layers were approximated the epidermis was reapproximated with particular care given to realign the vermillion border. A-T Advancement Flap Text: The defect edges were debeveled with a #15 scalpel blade.  Given the location of the defect, shape of the defect and the proximity to free margins an A-T advancement flap was deemed most appropriate.  Using a sterile surgical marker, an appropriate advancement flap was drawn incorporating the defect and placing the expected incisions within the relaxed skin tension lines where possible.    The area thus outlined was incised deep to adipose tissue with a #15 scalpel blade.  The skin margins were undermined to an appropriate distance in all directions utilizing iris scissors. Xenograft Text: The defect edges were debeveled with a #15 scalpel blade.  Given the location of the defect, shape of the defect and the proximity to free margins a xenograft was deemed most appropriate.  The graft was then trimmed to fit the size of the defect.  The graft was then placed in the primary defect and oriented appropriately. Rhomboid Transposition Flap Text: The defect edges were debeveled with a #15 scalpel blade.  Given the location of the defect and the proximity to free margins a rhomboid transposition flap was deemed most appropriate.  Using a sterile surgical marker, an appropriate rhomboid flap was drawn incorporating the defect.    The area thus outlined was incised deep to adipose tissue with a #15 scalpel blade.  The skin margins were undermined to an appropriate distance in all directions utilizing iris scissors. O-Z Plasty Text: The defect edges were debeveled with a #15 scalpel blade.  Given the location of the defect, shape of the defect and the proximity to free margins an O-Z plasty (double transposition flap) was deemed most appropriate.  Using a sterile surgical marker, the appropriate transposition flaps were drawn incorporating the defect and placing the expected incisions within the relaxed skin tension lines where possible.    The area thus outlined was incised deep to adipose tissue with a #15 scalpel blade.  The skin margins were undermined to an appropriate distance in all directions utilizing iris scissors.  Hemostasis was achieved with electrocautery.  The flaps were then transposed into place, one clockwise and the other counterclockwise, and anchored with interrupted buried subcutaneous sutures. Deep Sutures: 4-0 Vicryl Date Of Previous Surgery (Optional): 6/25/19 O-L Flap Text: The defect edges were debeveled with a #15 scalpel blade.  Given the location of the defect, shape of the defect and the proximity to free margins an O-L flap was deemed most appropriate.  Using a sterile surgical marker, an appropriate advancement flap was drawn incorporating the defect and placing the expected incisions within the relaxed skin tension lines where possible.    The area thus outlined was incised deep to adipose tissue with a #15 scalpel blade.  The skin margins were undermined to an appropriate distance in all directions utilizing iris scissors. Double O-Z Plasty Text: The defect edges were debeveled with a #15 scalpel blade.  Given the location of the defect, shape of the defect and the proximity to free margins a Double O-Z plasty (double transposition flap) was deemed most appropriate.  Using a sterile surgical marker, the appropriate transposition flaps were drawn incorporating the defect and placing the expected incisions within the relaxed skin tension lines where possible. The area thus outlined was incised deep to adipose tissue with a #15 scalpel blade.  The skin margins were undermined to an appropriate distance in all directions utilizing iris scissors.  Hemostasis was achieved with electrocautery.  The flaps were then transposed into place, one clockwise and the other counterclockwise, and anchored with interrupted buried subcutaneous sutures. O-T Advancement Flap Text: The defect edges were debeveled with a #15 scalpel blade.  Given the location of the defect, shape of the defect and the proximity to free margins an O-T advancement flap was deemed most appropriate.  Using a sterile surgical marker, an appropriate advancement flap was drawn incorporating the defect and placing the expected incisions within the relaxed skin tension lines where possible.    The area thus outlined was incised deep to adipose tissue with a #15 scalpel blade.  The skin margins were undermined to an appropriate distance in all directions utilizing iris scissors. Cheiloplasty (Complex) Text: A decision was made to reconstruct the defect with a  cheiloplasty.  The defect was undermined extensively.  Additional obicularis oris muscle was excised with a 15 blade scalpel.  The defect was converted into a full thickness wedge to facilite a better cosmetic result.  Small vessels were then tied off with 5-0 monocyrl. The obicularis oris, superficial fascia, adipose and dermis were then reapproximated.  After the deeper layers were approximated the epidermis was reapproximated with particular care given to realign the vermillion border. Purse String (Simple) Text: Given the location of the defect and the characteristics of the surrounding skin a pursestring closure was deemed most appropriate.  Undermining was performed circumfirentially around the surgical defect.  A purstring suture was then placed and tightened. Bi-Rhombic Flap Text: The defect edges were debeveled with a #15 scalpel blade.  Given the location of the defect and the proximity to free margins a bi-rhombic flap was deemed most appropriate.  Using a sterile surgical marker, an appropriate rhombic flap was drawn incorporating the defect. The area thus outlined was incised deep to adipose tissue with a #15 scalpel blade.  The skin margins were undermined to an appropriate distance in all directions utilizing iris scissors. O-Z Flap Text: The defect edges were debeveled with a #15 scalpel blade.  Given the location of the defect, shape of the defect and the proximity to free margins an O-Z flap was deemed most appropriate.  Using a sterile surgical marker, an appropriate transposition flap was drawn incorporating the defect and placing the expected incisions within the relaxed skin tension lines where possible. The area thus outlined was incised deep to adipose tissue with a #15 scalpel blade.  The skin margins were undermined to an appropriate distance in all directions utilizing iris scissors. S Plasty Text: Given the location and shape of the defect, and the orientation of relaxed skin tension lines, an S-plasty was deemed most appropriate for repair.  Using a sterile surgical marker, the appropriate outline of the S-plasty was drawn, incorporating the defect and placing the expected incisions within the relaxed skin tension lines where possible.  The area thus outlined was incised deep to adipose tissue with a #15 scalpel blade.  The skin margins were undermined to an appropriate distance in all directions utilizing iris scissors. The skin flaps were advanced over the defect.  The opposing margins were then approximated with interrupted buried subcutaneous sutures. Primary Defect Length (In Cm): 0.9 Ear Wedge Repair Text: A wedge excision was completed by carrying down an excision through the full thickness of the ear and cartilage with an inward facing Burow's triangle. The wound was then closed in a layered fashion. Helical Rim Advancement Flap Text: The defect edges were debeveled with a #15 blade scalpel.  Given the location of the defect and the proximity to free margins (helical rim) a double helical rim advancement flap was deemed most appropriate.  Using a sterile surgical marker, the appropriate advancement flaps were drawn incorporating the defect and placing the expected incisions between the helical rim and antihelix where possible.  The area thus outlined was incised through and through with a #15 scalpel blade.  With a skin hook and iris scissors, the flaps were gently and sharply undermined and freed up. Purse String (Intermediate) Text: Given the location of the defect and the characteristics of the surrounding skin a pursestring intermediate closure was deemed most appropriate.  Undermining was performed circumfirentially around the surgical defect.  A purstring suture was then placed and tightened. V-Y Plasty Text: The defect edges were debeveled with a #15 scalpel blade.  Given the location of the defect, shape of the defect and the proximity to free margins an V-Y advancement flap was deemed most appropriate.  Using a sterile surgical marker, an appropriate advancement flap was drawn incorporating the defect and placing the expected incisions within the relaxed skin tension lines where possible.    The area thus outlined was incised deep to adipose tissue with a #15 scalpel blade.  The skin margins were undermined to an appropriate distance in all directions utilizing iris scissors. Hemostasis: Electrocautery Full Thickness Lip Wedge Repair (Flap) Text: Given the location of the defect and the proximity to free margins a full thickness wedge repair was deemed most appropriate.  Using a sterile surgical marker, the appropriate repair was drawn incorporating the defect and placing the expected incisions perpendicular to the vermilion border.  The vermilion border was also meticulously outlined to ensure appropriate reapproximation during the repair.  The area thus outlined was incised through and through with a #15 scalpel blade.  The muscularis and dermis were reaproximated with deep sutures following hemostasis. Care was taken to realign the vermilion border before proceeding with the superficial closure.  Once the vermilion was realigned the superfical and mucosal closure was finished. Graft Basting Suture (Optional): 5-0 Plain Gut Double O-Z Flap Text: The defect edges were debeveled with a #15 scalpel blade.  Given the location of the defect, shape of the defect and the proximity to free margins a Double O-Z flap was deemed most appropriate.  Using a sterile surgical marker, an appropriate transposition flap was drawn incorporating the defect and placing the expected incisions within the relaxed skin tension lines where possible. The area thus outlined was incised deep to adipose tissue with a #15 scalpel blade.  The skin margins were undermined to an appropriate distance in all directions utilizing iris scissors. Partial Purse String (Simple) Text: Given the location of the defect and the characteristics of the surrounding skin a simple purse string closure was deemed most appropriate.  Undermining was performed circumfirentially around the surgical defect.  A purse string suture was then placed and tightened. Wound tension only allowed a partial closure of the circular defect. Bilateral Helical Rim Advancement Flap Text: The defect edges were debeveled with a #15 blade scalpel.  Given the location of the defect and the proximity to free margins (helical rim) a bilateral helical rim advancement flap was deemed most appropriate.  Using a sterile surgical marker, the appropriate advancement flaps were drawn incorporating the defect and placing the expected incisions between the helical rim and antihelix where possible.  The area thus outlined was incised through and through with a #15 scalpel blade.  With a skin hook and iris scissors, the flaps were gently and sharply undermined and freed up. Primary Defect Width (In Cm): 0.8 Non-Graft Cartilage Fenestration Text: The cartilage was fenestrated with a 2mm punch biopsy to help facilitate healing. Ftsg Text: The defect edges were debeveled with a #15 scalpel blade.  Given the location of the defect, shape of the defect and the proximity to free margins a full thickness skin graft was deemed most appropriate.  Using a sterile surgical marker, the primary defect shape was transferred to the donor site. The area thus outlined was incised deep to adipose tissue with a #15 scalpel blade.  The harvested graft was then trimmed of adipose tissue until only dermis and epidermis was left.  The skin margins of the secondary defect were undermined to an appropriate distance in all directions utilizing iris scissors.  The secondary defect was closed with interrupted buried subcutaneous sutures.  The skin edges were then re-apposed with running  sutures.  The skin graft was then placed in the primary defect and oriented appropriately. Graft Cartilage Fenestration Text: The cartilage was fenestrated with a 2mm punch biopsy to help facilitate graft survival and healing. Ear Star Wedge Flap Text: The defect edges were debeveled with a #15 blade scalpel.  Given the location of the defect and the proximity to free margins (helical rim) an ear star wedge flap was deemed most appropriate.  Using a sterile surgical marker, the appropriate flap was drawn incorporating the defect and placing the expected incisions between the helical rim and antihelix where possible.  The area thus outlined was incised through and through with a #15 scalpel blade. V-Y Flap Text: The defect edges were debeveled with a #15 scalpel blade.  Given the location of the defect, shape of the defect and the proximity to free margins a V-Y flap was deemed most appropriate.  Using a sterile surgical marker, an appropriate advancement flap was drawn incorporating the defect and placing the expected incisions within the relaxed skin tension lines where possible.    The area thus outlined was incised deep to adipose tissue with a #15 scalpel blade.  The skin margins were undermined to an appropriate distance in all directions utilizing iris scissors. Pre-Op Size Of Lesion Removed (Optional): 0.7 Partial Purse String (Intermediate) Text: Given the location of the defect and the characteristics of the surrounding skin an intermediate purse string closure was deemed most appropriate.  Undermining was performed circumfirentially around the surgical defect.  A purse string suture was then placed and tightened. Wound tension only allowed a partial closure of the circular defect. Secondary Defect Length (In Cm): 4.5 Advancement-Rotation Flap Text: The defect edges were debeveled with a #15 scalpel blade.  Given the location of the defect, shape of the defect and the proximity to free margins an advancement-rotation flap was deemed most appropriate.  Using a sterile surgical marker, an appropriate advancement flap was drawn incorporating the defect and placing the expected incisions within the relaxed skin tension lines where possible.    The area thus outlined was incised deep to adipose tissue with a #15 scalpel blade.  The skin margins were undermined to an appropriate distance in all directions utilizing iris scissors. Epidermal Sutures: 5-0 Ethibond Banner Transposition Flap Text: The defect edges were debeveled with a #15 scalpel blade.  Given the location of the defect and the proximity to free margins a Banner transposition flap was deemed most appropriate.  Using a sterile surgical marker, an appropriate flap drawn around the defect. The area thus outlined was incised deep to adipose tissue with a #15 scalpel blade.  The skin margins were undermined to an appropriate distance in all directions utilizing iris scissors. Consent: The rationale for Repairs was explained to the patient and consent was obtained. The risks, benefits and alternatives to therapy were discussed in detail. Specifically, the risks of infection, scarring, bleeding, prolonged wound healing, incomplete removal, allergy to anesthesia, nerve injury and recurrence were addressed. Prior to the procedure, the treatment site was clearly identified and confirmed by the patient. All components of Universal Protocol/PAUSE Rule completed. X Size Of Lesion In Cm (Optional): 0.6 Localized Dermabrasion Text: The patient was draped in routine manner.  Localized dermabrasion using 3 x 17 mm wire brush was performed in routine manner to papillary dermis. This spot dermabrasion is being performed to complete skin cancer reconstruction. It also will eliminate the other sun damaged precancerous cells that are known to be part of the regional effect of a lifetime's worth of sun exposure. This localized dermabrasion is therapeutic and should not be considered cosmetic in any regard. H Plasty Text: Given the location of the defect, shape of the defect and the proximity to free margins a H-plasty was deemed most appropriate for repair.  Using a sterile surgical marker, the appropriate advancement arms of the H-plasty were drawn incorporating the defect and placing the expected incisions within the relaxed skin tension lines where possible. The area thus outlined was incised deep to adipose tissue with a #15 scalpel blade. The skin margins were undermined to an appropriate distance in all directions utilizing iris scissors.  The opposing advancement arms were then advanced into place in opposite direction and anchored with interrupted buried subcutaneous sutures. Estimated Blood Loss (Cc): minimal Secondary Defect Width (In Cm): 1.3 Secondary Intention Text (Leave Blank If You Do Not Want): The defect will heal with secondary intention.

## 2019-07-11 ENCOUNTER — APPOINTMENT (OUTPATIENT)
Dept: MRI IMAGING | Facility: HOSPITAL | Age: 29
End: 2019-07-11
Payer: COMMERCIAL

## 2019-07-11 ENCOUNTER — OUTPATIENT (OUTPATIENT)
Dept: OUTPATIENT SERVICES | Facility: HOSPITAL | Age: 29
LOS: 1 days | End: 2019-07-11
Payer: COMMERCIAL

## 2019-07-11 DIAGNOSIS — Z95.828 PRESENCE OF OTHER VASCULAR IMPLANTS AND GRAFTS: Chronic | ICD-10-CM

## 2019-07-11 DIAGNOSIS — Z98.890 OTHER SPECIFIED POSTPROCEDURAL STATES: Chronic | ICD-10-CM

## 2019-07-11 PROCEDURE — 70552 MRI BRAIN STEM W/DYE: CPT

## 2019-07-11 PROCEDURE — A9585: CPT

## 2019-07-11 PROCEDURE — 78472 GATED HEART PLANAR SINGLE: CPT

## 2019-07-11 PROCEDURE — A9560: CPT

## 2019-07-11 PROCEDURE — 70552 MRI BRAIN STEM W/DYE: CPT | Mod: 26

## 2019-07-11 PROCEDURE — 78472 GATED HEART PLANAR SINGLE: CPT | Mod: 26

## 2019-07-18 NOTE — PHYSICAL EXAM
[General Appearance - Alert] : alert [General Appearance - In No Acute Distress] : in no acute distress [Thin] : thin [Auscultation Breath Sounds / Voice Sounds] : lungs were clear to auscultation bilaterally [Heart Rate And Rhythm] : heart rate and rhythm were normal [Heart Sounds] : normal S1 and S2 [Bowel Sounds] : normal bowel sounds [Abdomen Soft] : soft [Cervical Lymph Nodes Enlarged Posterior Bilaterally] : posterior cervical [Cervical Lymph Nodes Enlarged Anterior Bilaterally] : anterior cervical [Supraclavicular Lymph Nodes Enlarged Bilaterally] : supraclavicular [Normal] : no joint swelling, no clubbing or cyanosis of the fingernails and muscle strength and tone were normal [Skin Color & Pigmentation] : normal skin color and pigmentation [] : no rash [No Focal Deficits] : no focal deficits [Oriented To Time, Place, And Person] : oriented to person, place, and time [de-identified] : healed surgical scars [de-identified] : healed surgical scars

## 2019-07-18 NOTE — REVIEW OF SYSTEMS
[Fatigue] : fatigue [SOB on Exertion] : shortness of breath during exertion [Joint Pain] : joint pain [Anxiety] : anxiety [Negative] : Heme/Lymph [Cognitive Disturbance: Grade 0] : Cognitive Disturbance: Grade 0 [Dizziness: Grade 0] : Dizziness: Grade 0  [Headache: Grade 0] : Headache: Grade 0 [Fever] : no fever [Chills] : no chills [Night Sweats] : no night sweats [Recent Change In Weight] : ~T no recent weight change [Skin Rash] : no skin rash [Confused] : no confusion [Dizziness] : no dizziness [Fainting] : no fainting [Difficulty Walking] : no difficulty walking [FreeTextEntry5] : right chest wall port [FreeTextEntry8] : pregnant [de-identified] : healed surgical incisions [de-identified] : as noted in HPI
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
## 2019-07-22 ENCOUNTER — APPOINTMENT (OUTPATIENT)
Dept: RADIATION ONCOLOGY | Facility: CLINIC | Age: 29
End: 2019-07-22

## 2019-07-24 ENCOUNTER — APPOINTMENT (OUTPATIENT)
Dept: RADIATION ONCOLOGY | Facility: CLINIC | Age: 29
End: 2019-07-24
Payer: COMMERCIAL

## 2019-07-24 VITALS
HEART RATE: 55 BPM | SYSTOLIC BLOOD PRESSURE: 101 MMHG | BODY MASS INDEX: 18.88 KG/M2 | DIASTOLIC BLOOD PRESSURE: 68 MMHG | WEIGHT: 99.9 LBS | RESPIRATION RATE: 16 BRPM | OXYGEN SATURATION: 98 %

## 2019-07-24 PROCEDURE — 99212 OFFICE O/P EST SF 10 MIN: CPT

## 2019-07-24 NOTE — VITALS
[ECOG Performance Status: 1 - Restricted in physically strenuous activity but ambulatory and able to carry out work of a light or sedentary nature] : Performance Status: 1 - Restricted in physically strenuous activity but ambulatory and able to carry out work of a light or sedentary nature, e.g., light house work, office work [90: Able to carry normal activity; minor signs or symptoms of disease.] : 90: Able to carry normal activity; minor signs or symptoms of disease.  [Maximal Pain Intensity: 9/10] : 9/10 [Pain Location: ___] : Pain Location: [unfilled] [NoTreatment Scheduled] : no treatment scheduled

## 2019-07-26 ENCOUNTER — APPOINTMENT (OUTPATIENT)
Dept: CT IMAGING | Facility: HOSPITAL | Age: 29
End: 2019-07-26

## 2019-07-26 ENCOUNTER — OUTPATIENT (OUTPATIENT)
Dept: OUTPATIENT SERVICES | Facility: HOSPITAL | Age: 29
LOS: 1 days | End: 2019-07-26
Payer: COMMERCIAL

## 2019-07-26 DIAGNOSIS — Z98.890 OTHER SPECIFIED POSTPROCEDURAL STATES: Chronic | ICD-10-CM

## 2019-07-26 DIAGNOSIS — Z95.828 PRESENCE OF OTHER VASCULAR IMPLANTS AND GRAFTS: Chronic | ICD-10-CM

## 2019-07-26 PROCEDURE — 74177 CT ABD & PELVIS W/CONTRAST: CPT

## 2019-07-26 PROCEDURE — 71260 CT THORAX DX C+: CPT

## 2019-07-26 PROCEDURE — 71260 CT THORAX DX C+: CPT | Mod: 26

## 2019-07-26 PROCEDURE — 74177 CT ABD & PELVIS W/CONTRAST: CPT | Mod: 26

## 2019-07-26 RX ORDER — DEXAMETHASONE 2 MG/1
2 TABLET ORAL
Qty: 60 | Refills: 1 | Status: ACTIVE | COMMUNITY
Start: 2019-07-26 | End: 1900-01-01

## 2019-07-28 NOTE — PHYSICAL EXAM
[General Appearance - Alert] : alert [General Appearance - In No Acute Distress] : in no acute distress [Thin] : thin [Auscultation Breath Sounds / Voice Sounds] : lungs were clear to auscultation bilaterally [Heart Sounds] : normal S1 and S2 [Heart Rate And Rhythm] : heart rate and rhythm were normal [Bowel Sounds] : normal bowel sounds [Abdomen Soft] : soft [Oriented To Time, Place, And Person] : oriented to person, place, and time [Normal] : no focal deficits [de-identified] : CN II-XII grossly intact, strength 5/5 throughout

## 2019-07-28 NOTE — REVIEW OF SYSTEMS
[Dizziness: Grade 0] : Dizziness: Grade 0  [Cognitive Disturbance: Grade 0] : Cognitive Disturbance: Grade 0 [Headache: Grade 0] : Headache: Grade 0 [Negative] : Psychiatric [de-identified] : as noted in HPI
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
## 2019-08-20 ENCOUNTER — OUTPATIENT (OUTPATIENT)
Dept: OUTPATIENT SERVICES | Facility: HOSPITAL | Age: 29
LOS: 1 days | End: 2019-08-20
Payer: COMMERCIAL

## 2019-08-20 DIAGNOSIS — Z95.828 PRESENCE OF OTHER VASCULAR IMPLANTS AND GRAFTS: Chronic | ICD-10-CM

## 2019-08-20 DIAGNOSIS — Z98.890 OTHER SPECIFIED POSTPROCEDURAL STATES: Chronic | ICD-10-CM

## 2019-08-20 LAB — GLUCOSE BLDC GLUCOMTR-MCNC: 94 MG/DL — SIGNIFICANT CHANGE UP (ref 70–99)

## 2019-08-20 PROCEDURE — A9552: CPT

## 2019-08-20 PROCEDURE — 82962 GLUCOSE BLOOD TEST: CPT

## 2019-08-20 PROCEDURE — 78815 PET IMAGE W/CT SKULL-THIGH: CPT | Mod: 26

## 2019-08-20 PROCEDURE — 78815 PET IMAGE W/CT SKULL-THIGH: CPT

## 2019-08-22 ENCOUNTER — APPOINTMENT (OUTPATIENT)
Dept: CT IMAGING | Facility: HOSPITAL | Age: 29
End: 2019-08-22

## 2019-10-29 ENCOUNTER — APPOINTMENT (OUTPATIENT)
Dept: MRI IMAGING | Facility: HOSPITAL | Age: 29
End: 2019-10-29

## 2019-11-18 NOTE — DATA REVIEWED
[de-identified] : EXAM: MR BRAIN IC \par \par PROCEDURE DATE: 07/11/2019 \par \par \par \par INTERPRETATION: PROCEDURE: MRI Brain with and without contrast \par \par INDICATION: Breast cancer C50.912. Follow-up of metastatic disease to brain \par after gamma knife therapy. \par \par TECHNIQUE: Sagittal T1 volumetric series with MPR, axial T2-FLAIR, T2-FFE \par and diffusion imaging of the brain is obtained. Following the intravenous \par administration of 7.5 cc Gadavist contrast material, axial T2 spin-echo \par imaging is obtained followed by sagittal T1 volumetric imaging with MPR \par provided. \par \par COMPARISON: MRI brain 04/19/2019 and 1/24/2019. \par \par FINDINGS: \par \par There has been slight growth of lobulated enhancing lesion in the right \par posterior-inferior cerebellar hemisphere. Currently, the lesion measures 1.1 \par x 0.9 x 1.2 cm, compared to 04/19/2019 when it measured 1.0 x 0.8 x 1.0 cm. \par On 01/24/2019, the lesion measured 0.9 x 0.7 x 0.8 cm. However, there is no \par evidence of residual or recurrent edema signal on T2-FLAIR. Furthermore, \par borders of the lesion are ill-defined rather than well-circumscribed as a \par mass. Appearance favors delayed radiation effect, also known as \par pseudo-progression. Given more than 25% progression in size since 1/24/2019, \par tumor recurrence/progression of this metastatic lesion is not confidently \par excluded. \par \par No new metastatic lesion. Specifically, there is no new focus of pathologic \par intracranial enhancement. \par \par The ventricles, cisternal spaces, and cortical sulci are normal in size and \par configuration. \par \par There is no mass effect, midline shift or extra-axial collection. \par \par Remote from the right cerebellum, there remains no pathologic signal in the \par brain parenchyma. The diffusion-weighted images demonstrate no recent \par infarction. The susceptibility-weighted images show no parenchymal blood \par product deposition. There are preserved large vascular flow-voids. \par \par There is diminished size of right maxillary sinus polypoid mucosal change. \par No sinus fluid level. Mastoid air cells remain clear. \par \par \par IMPRESSION: \par \par No evidence of new intracranial metastasis. \par \par There is mild growth of treated right cerebellar enhancing lesion when \par compared to January. This is indeterminate, but favored to reflect \par pseudo-progression after RT as opposed to true tumor progression/recurrence. \par See discussion above. \par \par Close follow-up is suggested. In the future, surveillance with MRI and MR \par perfusion may be beneficial. \par \par \par \par \par \par Thank you for the opportunity to participate in the care of this patient. \par \par \par \par BRIAN TRINH M.D. ATTENDING RADIOLOGIST \par This document has been electronically signed. Jul 12 2019 1:18PM

## 2019-11-18 NOTE — REASON FOR VISIT
[Follow-Up: _____] : a [unfilled] follow-up visit [Family Member] : family member [FreeTextEntry1] : s/p Gamma Knife to 1.5 cm metastatic tumor in right cerebellum on 11/7/18

## 2019-11-22 ENCOUNTER — APPOINTMENT (OUTPATIENT)
Dept: NEUROSURGERY | Facility: CLINIC | Age: 29
End: 2019-11-22

## 2020-07-20 NOTE — PROGRESS NOTE ADULT - SUBJECTIVE AND OBJECTIVE BOX
2020    TELEHEALTH EVALUATION -- Audio/Visual (During VBULB-80 public health emergency)    HPI:  Elie Johnson (:  1979) has requested an audio/video evaluation for the following concern(s):    Right upper quadrant pain follow up. Pt is s/p cholecystectomy for chronic cholecystitis with calculus 1 yr ago. Recently developed pain in same RUQ and awaits US. Hx fatty liver with normal LFT. Seen yesterday at ER for persistent pain and low grade fever with nausea, no vomiting, no diarrhea. NO skin rash. CT abd negative. UA showed UTI. No flank pain, no painful or bloody urination, no hx kidney stones. Review of Systems   Constitutional: Negative for chills, diaphoresis, fatigue and fever. HENT: Negative for congestion, ear discharge, ear pain, rhinorrhea, sinus pressure, sinus pain, sneezing and sore throat. Respiratory: Negative for cough, shortness of breath and wheezing. Cardiovascular: Negative for chest pain. Gastrointestinal: Positive for abdominal pain and nausea. Negative for diarrhea and vomiting. Endocrine: Negative for cold intolerance and heat intolerance. Genitourinary: Negative for dysuria and frequency. Neurological: Negative for dizziness and light-headedness. Prior to Visit Medications    Medication Sig Taking? Authorizing Provider   sulfamethoxazole-trimethoprim (BACTRIM DS) 800-160 MG per tablet Take 1 tablet by mouth 2 times daily for 5 days Yes Michael Zambrano MD   fluconazole (DIFLUCAN) 150 MG tablet Take 1 tablet by mouth once for 1 dose Yes Michael Zambrano MD   sertraline (ZOLOFT) 100 MG tablet   Historical Provider, MD   norethindrone-ethinyl estradiol (Reanna Rhodes ,) 1-35 MG-MCG per tablet Take 1 tablet by mouth daily  Michael Zambrano MD   CPAP Machine MISC by Does not apply route Diagnosis obstructive sleep apnea.  CPAP at pressure of 4 cm  Srinivas Burt MD   vitamin D (ERGOCALCIFEROL) 59551 UNITS CAPS capsule Take 1 capsule by mouth once a week  William Garay SUBJECTIVE:  Doing well.   No overnight events.     OBJECTIVE:     ** VITAL SIGNS / I&O's **    T(C): 36.4 (08-27-17 @ 07:08), Max: 36.8 (08-26-17 @ 08:25)  T(F): 97.5 (08-27-17 @ 07:08), Max: 98.2 (08-26-17 @ 08:25)  HR: 79 (08-27-17 @ 07:08) (63 - 102)  BP: 98/63 (08-27-17 @ 07:08) (95/59 - 107/74)  RR: 17 (08-27-17 @ 07:08) (15 - 17)  SpO2: 98% (08-27-17 @ 07:08) (96% - 99%)      26 Aug 2017 07:01  -  27 Aug 2017 07:00  --------------------------------------------------------  IN:    IV PiggyBack: 700 mL    Oral Fluid: 120 mL    sodium chloride 0.9%: 351 mL  Total IN: 1171 mL    OUT:    Voided: 2949 mL  Total OUT: 2949 mL    Total NET: -1778 mL          ** PHYSICAL EXAM **  Improving erythema, areas of desquamation on L breast/chest, no fluctuance noted    ** LABS **              CAPILLARY BLOOD GLUCOSE Tomeka Olvera, APRN - CNP   ibuprofen (ADVIL;MOTRIN) 200 MG tablet Take 200 mg by mouth every 6 hours as needed for Pain. Historical Provider, MD       Social History     Tobacco Use    Smoking status: Never Smoker    Smokeless tobacco: Never Used   Substance Use Topics    Alcohol use: No    Drug use: No     No Known Allergies,   Past Medical History:   Diagnosis Date    Anxiety     generalized    Bipolar disorder ([address]    ,   Past Surgical History:   Procedure Laterality Date    CHOLECYSTECTOMY, LAPAROSCOPIC N/A 5/15/2019    LAPAROSCOPIC CHOLECYSTECTOMY WITH CHOLANGIOGRAMS performed by Shima Underwood MD at [address] [place] [address]  2008   ,   Social History     Tobacco Use    Smoking status: Never Smoker    Smokeless tobacco: Never Used   Substance Use Topics    Alcohol use: No    Drug use: No       PHYSICAL EXAMINATION:  [ INSTRUCTIONS:  \"[x]\" Indicates a positive item  \"[]\" Indicates a negative item  -- DELETE ALL ITEMS NOT EXAMINED]  Vital Signs: (As obtained by patient/caregiver or practitioner observation)    Blood pressure-  Heart rate-    Respiratory rate-    Temperature-  Pulse oximetry-     Constitutional: [x] Appears well-developed and well-nourished [x] No apparent distress      [] Abnormal-   Mental status  [x] Alert and awake  [x] Oriented to person/place/time [x]Able to follow commands      Eyes:  EOM    []  Normal  [] Abnormal-  Sclera  []  Normal  [] Abnormal -         Discharge []  None visible  [] Abnormal -    HENT:   [x] Normocephalic, atraumatic.   [x] Abnormal   [] Mouth/Throat: Mucous membranes are moist.     External Ears [] Normal  [] Abnormal-     Neck: [] No visualized mass   Pulmonary/Chest: [x] Respiratory effort normal.  [x] No visualized signs of difficulty breathing or respiratory distress        [] Abnormal-      Musculoskeletal:   [] Normal gait with no signs of ataxia         [] Normal range of motion of neck        [] Abnormal-       Neurological: contact this office for worsening conditions or problems, and seek emergency medical treatment and/or call 911 if deemed necessary. Patient identification was verified at the start of the visit: Yes    Total time spent on this encounter: Not billed by time    Services were provided through a video synchronous discussion virtually to substitute for in-person clinic visit. Patient and provider were located at their individual homes. --Dalton Guo MD on 7/20/2020 at 3:45 PM    An electronic signature was used to authenticate this note.

## 2020-08-11 NOTE — PRE-OP CHECKLIST - TEMPERATURE IN CELSIUS (DEGREES C)
36.4 Vital Signs Last 24 Hrs  T(C): 36.8 (11 Aug 2020 11:44), Max: 37.3 (11 Aug 2020 04:09)  T(F): 98.3 (11 Aug 2020 11:44), Max: 99.2 (11 Aug 2020 04:09)  HR: 48 (11 Aug 2020 11:44) (46 - 62)  BP: 121/72 (11 Aug 2020 11:44) (110/68 - 121/72)  BP(mean): --  RR: 18 (11 Aug 2020 11:44) (18 - 18)  SpO2: 95% (11 Aug 2020 11:44) (95% - 98%)

## 2021-06-16 NOTE — PROGRESS NOTE ADULT - ASSESSMENT
26yFemale s/p above procedure    Regular diet  Ancef  IS  OOBA  AM labs   CALIXTO x 3 to self suction  pain, nausea control 26yFemale s/p bilateral mastectomy, Left axillary node dissection, tissue expanders    Regular diet  Ancef  IS  OOBA  AM labs   CALIXTO x 3 to self suction  pain, nausea control complains of pain/discomfort

## 2021-07-13 NOTE — PHYSICAL THERAPY INITIAL EVALUATION ADULT - NAME OF DISCHARGE PLANNER
Additional Notes: Patient consent was obtained to proceed with the visit and recommended plan of care after discussion of all risks and benefits, including the risks of COVID-19 exposure. Detail Level: Simple Render Risk Assessment In Note?: yes Yesica DALAL

## 2021-08-09 NOTE — DISCHARGE NOTE ADULT - PROVIDER RX CONTACT NUMBER
Patient Education    [facility]  15 THROUGH 17 YEAR VISITS  Here are some suggestions from [employer] experts that may be of value to your family.     HOW YOUR FAMILY IS DOING  Set aside time to be with your teen and really listen to her hopes and concerns.  Support your teen in finding activities that interest him. Encourage your teen to help others in the community.  Help your teen find and be a part of positive after-school activities and sports.  Support your teen as she figures out ways to deal with stress, solve problems, and make decisions.  Help your teen deal with conflict.  If you are worried about your living or food situation, talk with us. Community agencies and programs such as SNAP can also provide information.    YOUR GROWING AND CHANGING TEEN  Make sure your teen visits the dentist at least twice a year.  Give your teen a fluoride supplement if the dentist recommends it.  Support your teen s healthy body weight and help him be a healthy eater.  Provide healthy foods.  Eat together as a family.  Be a role model.  Help your teen get enough calcium with low-fat or fat-free milk, low-fat yogurt, and cheese.  Encourage at least 1 hour of physical activity a day.  Praise your teen when she does something well, not just when she looks good.    YOUR TEEN S FEELINGS  If you are concerned that your teen is sad, depressed, nervous, irritable, hopeless, or angry, let us know.  If you have questions about your teen s sexual development, you can always talk with us.    HEALTHY BEHAVIOR CHOICES  Know your teen s friends and their parents. Be aware of where your teen is and what he is doing at all times.  Talk with your teen about your values and your expectations on drinking, drug use, tobacco use, driving, and sex.  Praise your teen for healthy decisions about sex, tobacco, alcohol, and other drugs.  Be a role model.  Know your teen s friends and their activities together.  Lock your  liquor in a cabinet.  Store prescription medications in a locked cabinet.  Be there for your teen when she needs support or help in making healthy decisions about her behavior.    SAFETY  Encourage safe and responsible driving habits.  Lap and shoulder seat belts should be used by everyone.  Limit the number of friends in the car and ask your teen to avoid driving at night.  Discuss with your teen how to avoid risky situations, who to call if your teen feels unsafe, and what you expect of your teen as a .  Do not tolerate drinking and driving.  If it is necessary to keep a gun in your home, store it unloaded and locked with the ammunition locked separately from the gun.      Consistent with Bright Futures: Guidelines for Health Supervision of Infants, Children, and Adolescents, 4th Edition  For more information, go to https://brightfutures.aap.org.         Patient Education     Prozac Oral Capsule 20 mg  Uses  This medicine is used for the following purposes:    anxiety    depression    eating disorders    menopausal symptoms    muscle weakness    obsessive compulsive disorder    post-traumatic stress disorder  Instructions  This medicine may be taken with or without food.  It is very important that you take the medicine at about the same time every day. It will work best if you do this.  Keep the medicine at room temperature. Avoid heat and direct light.  It is important that you keep taking each dose of this medicine on time even if you are feeling well.  If you forget to take a dose on time, take it as soon as you remember. If it is almost time for the next dose, do not take the missed dose. Return to your normal dosing schedule. Do not take 2 doses of this medicine at one time.  Please tell your doctor and pharmacist about all the medicines you take. Include both prescription and over-the-counter medicines. Also tell them about any vitamins, herbal medicines, or anything else you take for your health.  Do  not suddenly stop taking this medicine. Check with your doctor before stopping.  Cautions  Tell your doctor and pharmacist if you ever had an allergic reaction to a medicine. Symptoms of an allergic reaction can include trouble breathing, skin rash, itching, swelling, or severe dizziness.  Do not use the medication any more than instructed.  Your ability to stay alert or to react quickly may be impaired by this medicine. Do not drive or operate machinery until you know how this medicine will affect you.  Please check with your doctor before drinking alcohol while on this medicine.  Contact your doctor if you notice a change in the amount or darkening of your urine.  Family should check on the patient often. Call the doctor if patient becomes more depressed, has thoughts of suicide, or shows changes in behavior.  Call the doctor if there are any signs of confusion or unusual changes in behavior.  Tell the doctor or pharmacist if you are pregnant, planning to be pregnant, or breastfeeding.  Ask your pharmacist if this medicine can interact with any of your other medicines. Be sure to tell them about all the medicines you take.  Do not start or stop any other medicines without first speaking to your doctor or pharmacist.  If you have painful erection or an erection for more than 4 hours, seek medical care right away.  Do not share this medicine with anyone who has not been prescribed this medicine.  This medicine can cause serious side effects in some patients. Important information from the U.S. Food and Drug Administration (FDA) is available from your pharmacist. Please review it carefully with your pharmacist to understand the risks associated with this medicine.  Side Effects  The following is a list of some common side effects from this medicine. Please speak with your doctor about what you should do if you experience these or other side effects.    agitated feeling or trouble sleeping    decreased  appetite    dizziness    drowsiness or sedation    lack of energy and tiredness    nausea    sweating  Call your doctor or get medical help right away if you notice any of these more serious side effects:    bleeding that is severe or takes longer to stop    unusual bruising or discoloration on skin    confusion    coughing up blood or vomit that looks like coffee grounds    swelling of the legs, feet, and hands    pain in the eye    fainting    hallucinations (unusual thoughts, seeing or hearing things that are not real)    fast or irregular heart beats    muscle aches, spasms or abnormal movements    muscle weakness    dilation of the pupils    problems with sexual functions or desire    blood in stool    dark, tarry stool    suicidal thoughts    blurring or changes of vision    severe or persistent vomiting    unexpected or extreme weight loss  A few people may have an allergic reactions to this medicine. Symptoms can include difficulty breathing, skin rash, itching, swelling, or severe dizziness. If you notice any of these symptoms, seek medical help quickly.  Extra  Please speak with your doctor, nurse, or pharmacist if you have any questions about this medicine.  [email or web address]  IMPORTANT NOTE: This document tells you briefly how to take your medicine, but it does not tell you all there is to know about it.Your doctor or pharmacist may give you other documents about your medicine. Please talk to them if you have any questions.Always follow their advice. There is a more complete description of this medicine available in English.Scan this code on your smartphone or tablet or use the web address below. You can also ask your pharmacist for a printout. If you have any questions, please ask your pharmacist.     2021 [employer].            [phone]

## 2022-06-14 NOTE — PROGRESS NOTE ADULT - SUBJECTIVE AND OBJECTIVE BOX
SUBJECTIVE: Patient seen and examined bedside. Reports feeling "okay," though with persistent severe pain, well controlled with medication. She also feels that her left breast is more tender than yesterday, but skin is improving. Denies nausea, vomiting, fevers, chills, SOB.    piperacillin/tazobactam IVPB. 3.375 Gram(s) IV Intermittent every 6 hours  vancomycin  IVPB 1250 milliGRAM(s) IV Intermittent every 12 hours  enoxaparin Injectable 40 milliGRAM(s) SubCutaneous daily      Vital Signs Last 24 Hrs  T(C): 36.3 (29 Aug 2017 05:25), Max: 36.9 (28 Aug 2017 17:43)  T(F): 97.4 (29 Aug 2017 05:25), Max: 98.4 (28 Aug 2017 17:43)  HR: 67 (29 Aug 2017 05:25) (67 - 84)  BP: 100/67 (29 Aug 2017 05:25) (95/59 - 105/71)  BP(mean): --  RR: 16 (29 Aug 2017 05:25) (16 - 17)  SpO2: 98% (29 Aug 2017 05:25) (96% - 98%)  I&O's Detail    28 Aug 2017 07:01  -  29 Aug 2017 07:00  --------------------------------------------------------  IN:    Oral Fluid: 360 mL    Solution: 100 mL    Solution: 250 mL  Total IN: 710 mL    OUT:    Voided: 800 mL  Total OUT: 800 mL    Total NET: -90 mL          General: NAD, resting comfortably in bed. Well developed, well groomed  Pulm: Nonlabored breathing, no respiratory distress on RA  Breasts: Right breast WNL, s/p mastectomy and tissue expander. Left breast with significant desquamation, erythema. Very tender to palpation, especially over medial edge, associated with mild to moderate fluctuation. No drainage or ecchymosis.  Abd: soft, NT/ND.  Extrem: WWP, no edema, SCDs in place        LABS:                        9.4    3.5   )-----------( 258      ( 29 Aug 2017 06:32 )             29.4     08-28    139  |  103  |  7   ----------------------------<  101<H>  3.9   |  23  |  0.60    Ca    9.4      28 Aug 2017 06:27            RADIOLOGY & ADDITIONAL STUDIES: 2018
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
## 2023-01-01 NOTE — ED ADULT TRIAGE NOTE - CHIEF COMPLAINT QUOTE
[Normal Growth] : growth [Normal Development] : developmental [No Elimination Concerns] : elimination [Continue Regimen] : feeding [No Skin Concerns] : skin [Normal Sleep Pattern] : sleep [None] : no known medical problems [Anticipatory Guidance Given] : Anticipatory guidance addressed as per the history of present illness section [Hepatitis B In Hospital] : Hepatitis B administered while in the hospital [No Vaccines] : no vaccines needed [No Medications] : ~He/She~ is not on any medications [Parent/Guardian] : Parent/Guardian [Term Infant] : term infant [de-identified] : SGA, IUGR [FreeTextEntry1] : F/T by , SGA, IUGR, Breast, f/u 2 weeks patient c/o left breast redness , blister and swelling for 2 weeks , had radiation therapy 08/14/17 . Was at [facility] last night and signed AMA .

## 2023-09-27 NOTE — OCCUPATIONAL THERAPY INITIAL EVALUATION ADULT - LIGHT TOUCH SENSATION, LUE, REHAB EVAL
OFFICE VISIT  Patient: Ania Mason   : 1934 MRN: [id number]  SUBJECTIVE:      Chief Complaint   Patient presents with   • Follow-up   • Office Visit           This 89 year old female is here for follow up of multiple medical conditions.    Patient has given consent to record this visit for documentation in their clinical record.     HISTORY OF PRESENT ILLNESS:    Generalized osteoarthrosis, involving multiple sites/ Rheumatoid arthritis involving multiple sites with positive rheumatoid factor (CMD):  Patient is now following up with Rheumatologist Dr Liborio Herring at \[facility]\"" in [facility]. Current regimen includes Methotrexate 2.5 mg with Folic Acid 1 mg. She was given an oral prednisone tapering course which she completed last week. She was feeling quite good while taking Prednisone. Now again experiencing generalized joint pain. Wishes to go back on Prednisone; she has 10 mg Prednisone tablets left from a previous prescription. Patient also takes Gabapentin 300 mg two capsules twice a day.      Essential hypertension:  She takes Metoprolol Tartrate 25 mg twice daily, Amlodipine 5 mg daily and Furosemide 20 mg daily.  Blood pressures have been at goal.  Denies any postural lightheadedness or dizziness.     Hyperuricemia/ Gouty arthropathy:  Symptoms are currently stable. Last uric acid level from 2023 was normal at 4.8 mg/dL. She takes Allopurinol 100 mg once daily.     Dyslipidemia, goal LDL below 100:  She takes Pravachol 40 mg daily.    Stage 3a chronic kidney disease (CMD):  CMP from 2023 reported normal BUN and creatinine and GFR of 54.     Patient Care Team:  Dai Maharaj MD as PCP - General (Family Practice)  Fabian Sutherland MD (Plastic Surgery)  Jorge A Solis OD as Referring Provider (Optometry)  Maximo Chin MD as Cardiologist (Internal Medicine- Interventional Cardiology)    PAST MEDICAL HISTORY:  Past Medical History:   Diagnosis Date   • Acute gout due to renal  impairment involving right ankle 3/6/2022   • Age-related osteoporosis with current pathological fracture of vertebra with delayed healing 6/2/2020   • Aortic stenosis    • Bilateral edema of lower extremity    • CKD (chronic kidney disease)    • DDD (degenerative disc disease)    • DDD (degenerative disc disease), cervical    • DDD (degenerative disc disease), lumbar    • Diverticulosis    • Essential (primary) hypertension    • Fibromyalgia    • Fracture     T12, L1   • Gastroesophageal reflux disease    • High cholesterol    • Lumbosacral spondylosis without myelopathy 6/2/2020   • Obesity    • Osteoporosis, unspecified 05/30/2012   • Pseudophakia of both eyes    • Rheumatoid arthritis (CMD)    • TIA (transient ischemic attack)    • Trigeminal neuralgia    • Uterine Cancer    • UTI (urinary tract infection)    • Vitamin D deficiency      MEDICATIONS:  Current Outpatient Medications   Medication Sig   • methotrexate (RHEUMATREX) 2.5 MG tablet Take 6 tablets by mouth 1 day a week.   • allopurinol (ZYLOPRIM) 100 MG tablet Take 1 tablet by mouth daily.   • pravastatin (PRAVACHOL) 40 MG tablet Take 1 tablet by mouth at bedtime.   • gabapentin (NEURONTIN) 300 MG capsule TAKE 2 CAPSULES BY MOUTH TWICE DAILY   • folic acid (FOLATE) 1 MG tablet TAKE 2 TABLETS BY MOUTH DAILY   • clobetasol (TEMOVATE) 0.05 % ointment Apply over affected areas twice a day for 7-10 days.   • hydroCORTisone (CORTIZONE) 1 % cream Apply topically 2 times daily.   • amLODIPine (NORVASC) 5 MG tablet Take 1 tablet by mouth daily.   • metoPROLOL tartrate (LOPRESSOR) 25 MG tablet Take 1 tablet by mouth in the morning and 1 tablet in the evening.   • zoster vaccine recomb adjuvanted (SHINGRIX) 50 MCG/0.5ML injection Inject 0.5 mLs into the muscle 1 time. Repeat dose in 2 to 6 months (unless 1 dose already given), for a total of 2 doses.   • polyethylene glycol (MIRALAX) 17 g packet Take 17 g by mouth daily. Stir and dissolve powder in any 4 to 8  ounces of beverage, then drink.   • acetaminophen (TYLENOL) 500 MG tablet Take 2 tablets by mouth in the morning and 2 tablets at noon and 2 tablets in the evening. Take 2 tablets 3 times daily until pain is improved.  Then continue as needed.   • rivaroxaban (XARELTO) 15 MG Tab Take 1 tablet by mouth every evening.   • furosemide (LASIX) 20 MG tablet Take 1 tablet by mouth daily. Do not start before March 11, 2022.   • Multiple Vitamins-Minerals (MULTIVITAMIN ADULTS 50+) Tab Take 2 tablets by mouth 2 times daily.      No current facility-administered medications for this visit.     ALLERGIES:  ALLERGIES:   Allergen Reactions   • Shell Fish ANAPHYLAXIS   • Iodine   (Environmental Or Med) RASH   • Losartan Other (See Comments)     Lip swelling.   • Hydralazine Other (See Comments) and SWELLING     Lip swelling     PAST SURGICAL HISTORY:  Past Surgical History:   Procedure Laterality Date   • Appendectomy     • Cardiac catherization     • Cataract extraction w/  intraocular lens implant      OD: April 2003  OS: June 2004   • Colonoscopy diagnostic  06/06/2013    performed by Dr. Musa. Repeat in 10 years   • Colonoscopy remove lesions by snare  07/11/2007   • Dexa bone density axial skeleton  12/01/2011   • Hemorrhoid surgery  10/23/2019   • Hysterectomy  02/05/2008    PAUL with Ovaries Intact   • Joint replacement Bilateral     knees   • Laparoscopy, cholecystectomy      Cholecystectomy, laparoscopic   • Mammo screening bilateral  10/03/2011   • Tonsillectomy     • Total knee replacement Right 06/07/1999    Russell Boyd   • Total knee replacement Left 01/21/2005    Russell 1/21/2005   • Wrist surgery      left side     FAMILY HISTORY:  Family History   Problem Relation Age of Onset   • Heart disease Mother    • Hypertension Mother         and siblings   • Rheumatoid Arthritis Mother    • Hyperlipidemia Mother    • COPD Father    • Cancer, Colon Sister    • Cancer Brother    • Congestive Heart Failure Brother     • Emphysema Brother    • Patient is unaware of any medical problems Daughter    • Patient is unaware of any medical problems Son    • Patient is unaware of any medical problems Son    • Patient is unaware of any medical problems Son    • Blood Disorder Other         brother     SOCIAL HISTORY:  Social History     Tobacco Use   Smoking Status Never   Smokeless Tobacco Never     Social History     Substance and Sexual Activity   Alcohol Use No         Review of Systems    As per HPI.      OBJECTIVE:  Vitals:    09/26/23 1222   BP: 124/68   Pulse: 73   SpO2: 98%   Weight: 80.7 kg (178 lb)   Height: 5' 4\"     Body mass index is 30.55 kg/m².    Physical Exam    [Const]: Alert. Well-developed and well-nourished.  [Resp]: No apparent respiratory distress.  [Extremities]: Warm and well-perfused.  [Skin]: No visible rashes or lesions.  [Psych]: Appropriate mood and affect.      DIAGNOSTIC STUDIES:  LAB RESULTS:  No visits with results within 1 Month(s) from this visit.   Latest known visit with results is:   Office Visit on 03/23/2023   Component Date Value Ref Range Status   • COLOR, URINALYSIS 03/23/2023 Yellow   Final   • APPEARANCE, URINALYSIS 03/23/2023 Cloudy   Final   • GLUCOSE, URINALYSIS 03/23/2023 Negative  Negative mg/dL Final   • BILIRUBIN, URINALYSIS 03/23/2023 Negative  Negative Final   • KETONES, URINALYSIS 03/23/2023 Negative  Negative mg/dL Final   • SPECIFIC GRAVITY, URINALYSIS 03/23/2023 1.020  1.005 - 1.030 Final   • OCCULT BLOOD, URINALYSIS 03/23/2023 Negative  Negative Final   • PH, URINALYSIS 03/23/2023 5.5  5.0 - 7.0 Final   • PROTEIN, URINALYSIS 03/23/2023 Negative  Negative mg/dL Final   • UROBILINOGEN, URINALYSIS 03/23/2023 0.2  0.2, 1.0 mg/dL Final   • NITRITE, URINALYSIS 03/23/2023 Negative  Negative Final   • LEUKOCYTE ESTERASE, URINALYSIS 03/23/2023 Trace (A)  Negative Final   • SQUAMOUS EPITHELIAL, URINALYSIS 03/23/2023 1 to 5  None Seen, 1 to 5 /hpf Final   • ERYTHROCYTES, URINALYSIS  03/23/2023 None Seen  None Seen, 1 to 2 /hpf Final   • LEUKOCYTES, URINALYSIS 03/23/2023 6 to 10 (A)  None Seen, 1 to 5 /hpf Final   • BACTERIA, URINALYSIS 03/23/2023 Few (A)  None Seen /hpf Final   • HYALINE CASTS, URINALYSIS 03/23/2023 6 to 10 (A)  None Seen, 1 to 5 /lpf Final   • MUCUS 03/23/2023 Present   Final   • Urine, Bacterial Culture 03/23/2023 60,000 - 100,000 CFU/mL, Multiple organisms isolated with no predominant type, consistent with contamination. Consider recollection.   Final       ASSESSMENT AND PLAN:  This 89 year old female presents with :      1. Generalized osteoarthrosis, involving multiple sites    2. Rheumatoid arthritis involving multiple sites with positive rheumatoid factor (CMD)    3. Essential hypertension    4. Hyperuricemia    5. Gouty arthropathy    6. Dyslipidemia, goal LDL below 100    7. Stage 3a chronic kidney disease (CMD)        Plan:    Generalized osteoarthrosis, involving multiple sites/ Rheumatoid arthritis involving multiple sites with positive rheumatoid factor (CMD):  Comment:  Symptoms are uncontrolled.  Plan: Continue current regimen of Methotrexate 2.5 mg and folic acid as directed by rheumatologist.  Continue Gabapentin 300 mg two capsules twice a day.   Advised to take Prednisone 5 mg once daily with food and notify Dr Herring.  Advised to discuss with Dr. Herring regarding long term plan since she notices relief of arthritis symptoms with daily low-dose prednisone.    Continue following up with rheumatologist, Dr Herring.    Essential hypertension:  Comment: Well controlled.  Plan: Continue current regimen of Metoprolol Tartrate 25 mg twice daily, Amlodipine 5 mg daily and Furosemide 20 mg daily.     Continue to follow with her cardiologist Dr. Chin.     Hyperuricemia/ Gouty arthropathy:  Comment: Stable.  Reviewed and discussed last labs.  Plan: Continue Allopurinol 100 mg once daily, refills provided.    Dyslipidemia, goal LDL below 100:  Comment: Stable.  Plan:  Continue Pravachol 40 mg once daily, refills provided.     Stage 3a chronic kidney disease (CMD):  Comment: Chronic and stable.  Plan: Discussed about avoiding Nephrotoxic agents.  Renal function labs will have to be monitored periodically.    Return in about 4 months (around 1/29/2024) for Medicare with Dr Durdevic.  Will establish care with Dr Durdevic..    Orders Placed This Encounter   • methotrexate (RHEUMATREX) 2.5 MG tablet   • allopurinol (ZYLOPRIM) 100 MG tablet   • pravastatin (PRAVACHOL) 40 MG tablet       Refer to orders.  See the AVS for patient's instructions.  Medical compliance with plan discussed and risks of non-compliance reviewed.  Patient education completed on disease process, etiology & prognosis.  Proper usage and side effects of medications reviewed & discussed.  Patient understands and agrees with the plan.  Return to clinic as clinically indicated as discussed with patient who verbalized understanding of the plan and is in agreement with the plan.    IAnn, have created a visit summary document based on the audio recording between Dr. Dai Maharaj MD and this patient for the physician to review, edit as needed, and authenticate.  Creation Date: 9/27/2023 Time: 12:18 AM     The documentation recorded by the scribe accurately and completely reflects the service(s) I personally performed and the decisions made by me.     Dai Maharaj M.D.  [facility]     reports numbness of digits/mild impairment

## 2023-10-01 PROBLEM — Z92.3 HISTORY OF RADIATION THERAPY: Status: RESOLVED | Noted: 2018-10-30 | Resolved: 2023-10-01

## 2024-01-17 NOTE — PATIENT PROFILE ADULT. - SOCIAL CONCERNS
Quality 394b: Td/Tdap Immunizations For Adolescents: Patient had one tetanus, diphtheria toxoids and acellular pertussis vaccine (Tdap) on or between the patient's 10th and 13th birthdays. Quality 47: Advance Care Plan: Advance Care Planning discussed and documented; advance care plan or surrogate decision maker documented in the medical record. Quality 394a: Meningococcal Immunizations For Adolescents: Patient had one dose of meningococcal vaccine (serogroups A, C, W, Y) on or between the patient's 11th and 13th birthdays. Quality 402: Tobacco Use And Help With Quitting Among Adolescents: Patient screened for tobacco and never smoked Quality 394c: Hpv Vaccine For Adolescents: Patient has had at least two HPV vaccines (with at least 146 days between the two) OR three HPV vaccines on or between the patient’s 9th and 13th birthdays. Quality 130: Documentation Of Current Medications In The Medical Record: Current Medications Documented Quality 110: Preventive Care And Screening: Influenza Immunization: Influenza immunization was not ordered or administered, reason not given Quality 134: Screening For Clinical Depression And Follow-Up Plan: The patient was screened for depression and the screen was negative and no follow up required Detail Level: Detailed Quality 431: Preventive Care And Screening: Unhealthy Alcohol Use - Screening: Patient not identified as an unhealthy alcohol user when screened for unhealthy alcohol use using a systematic screening method None

## 2024-04-19 NOTE — H&P PST ADULT - PROBLEM/PLAN-1
Pt feeling anxious and \"can't relax\".  Prn IV medications given.    Daughter called with update on POC.     Statement Selected DISPLAY PLAN FREE TEXT

## 2024-06-19 NOTE — ASU PATIENT PROFILE, ADULT - MENTAL HEALTH CONDITIONS/SYMPTOMS, PROFILE
Patient had cholesterol screening and glucose yesterday 6/18, results are below and have been reviewed by Zee Werner PA-C:   Total cholesterol <100  HDL 42  Trigs 191  LDL N/A due to monitor   TC/HDL ration N/A due to monitor  Glucose 99  
none

## 2025-03-03 NOTE — PATIENT PROFILE ADULT. - NS TRANSFER RESPONSE BELONGING
yes
What Is The Reason For Today's Visit?: History of Squamous Cell Carcinoma
How Many Sccs Have You Had?: one
